# Patient Record
Sex: FEMALE | Race: BLACK OR AFRICAN AMERICAN | NOT HISPANIC OR LATINO | Employment: OTHER | URBAN - METROPOLITAN AREA
[De-identification: names, ages, dates, MRNs, and addresses within clinical notes are randomized per-mention and may not be internally consistent; named-entity substitution may affect disease eponyms.]

---

## 2017-06-01 RX ORDER — ATORVASTATIN CALCIUM 80 MG/1
TABLET, FILM COATED ORAL
Qty: 30 TABLET | Refills: 0 | Status: SHIPPED | OUTPATIENT
Start: 2017-06-01 | End: 2018-03-12 | Stop reason: SDUPTHER

## 2017-06-01 RX ORDER — CLOPIDOGREL BISULFATE 75 MG/1
TABLET ORAL
Qty: 30 TABLET | Refills: 0 | Status: SHIPPED | OUTPATIENT
Start: 2017-06-01 | End: 2018-03-12 | Stop reason: SDUPTHER

## 2017-06-06 ENCOUNTER — TELEPHONE (OUTPATIENT)
Dept: INTERNAL MEDICINE | Facility: CLINIC | Age: 82
End: 2017-06-06

## 2017-06-06 RX ORDER — LISINOPRIL 40 MG/1
TABLET ORAL
Qty: 30 TABLET | Refills: 0 | Status: SHIPPED | OUTPATIENT
Start: 2017-06-06 | End: 2018-03-12 | Stop reason: SDUPTHER

## 2017-12-29 RX ORDER — LISINOPRIL 40 MG/1
TABLET ORAL
Qty: 30 TABLET | Refills: 0 | OUTPATIENT
Start: 2017-12-29

## 2017-12-29 RX ORDER — HYDROCHLOROTHIAZIDE 25 MG/1
TABLET ORAL
Qty: 30 TABLET | Refills: 0 | OUTPATIENT
Start: 2017-12-29

## 2017-12-29 RX ORDER — ATORVASTATIN CALCIUM 80 MG/1
TABLET, FILM COATED ORAL
Qty: 30 TABLET | Refills: 0 | OUTPATIENT
Start: 2017-12-29

## 2017-12-29 RX ORDER — CLOPIDOGREL BISULFATE 75 MG/1
TABLET ORAL
Qty: 30 TABLET | Refills: 0 | OUTPATIENT
Start: 2017-12-29

## 2018-03-05 ENCOUNTER — TELEPHONE (OUTPATIENT)
Dept: INTERNAL MEDICINE | Facility: CLINIC | Age: 83
End: 2018-03-05

## 2018-03-05 NOTE — TELEPHONE ENCOUNTER
----- Message from Ofelia Garcia sent at 3/5/2018 12:24 PM CST -----  Contact: Hayden  - granddaughter at [phone]  Pt need refills on all of her medications. Pt is out of all medications.      [facility] [address] [place] [address]   [phone] (Phone)  [phone] (Fax)

## 2018-03-12 ENCOUNTER — LAB VISIT (OUTPATIENT)
Dept: LAB | Facility: HOSPITAL | Age: 83
End: 2018-03-12
Payer: MEDICARE

## 2018-03-12 ENCOUNTER — IMMUNIZATION (OUTPATIENT)
Dept: INTERNAL MEDICINE | Facility: CLINIC | Age: 83
End: 2018-03-12
Payer: MEDICARE

## 2018-03-12 ENCOUNTER — OFFICE VISIT (OUTPATIENT)
Dept: INTERNAL MEDICINE | Facility: CLINIC | Age: 83
End: 2018-03-12
Payer: MEDICARE

## 2018-03-12 VITALS
OXYGEN SATURATION: 98 % | BODY MASS INDEX: 31.68 KG/M2 | SYSTOLIC BLOOD PRESSURE: 190 MMHG | WEIGHT: 172.19 LBS | HEART RATE: 96 BPM | DIASTOLIC BLOOD PRESSURE: 80 MMHG | HEIGHT: 62 IN

## 2018-03-12 DIAGNOSIS — I25.10 CORONARY ARTERY DISEASE, ANGINA PRESENCE UNSPECIFIED, UNSPECIFIED VESSEL OR LESION TYPE, UNSPECIFIED WHETHER NATIVE OR TRANSPLANTED HEART: Primary | ICD-10-CM

## 2018-03-12 DIAGNOSIS — Z91.148 NON COMPLIANCE W MEDICATION REGIMEN: ICD-10-CM

## 2018-03-12 DIAGNOSIS — I77.9 CAROTID ARTERY DISEASE, UNSPECIFIED LATERALITY: ICD-10-CM

## 2018-03-12 DIAGNOSIS — Z87.891 HISTORY OF TOBACCO USE: ICD-10-CM

## 2018-03-12 DIAGNOSIS — I50.9 CONGESTIVE HEART FAILURE, UNSPECIFIED CONGESTIVE HEART FAILURE CHRONICITY, UNSPECIFIED CONGESTIVE HEART FAILURE TYPE: ICD-10-CM

## 2018-03-12 DIAGNOSIS — I25.10 CORONARY ARTERY DISEASE, ANGINA PRESENCE UNSPECIFIED, UNSPECIFIED VESSEL OR LESION TYPE, UNSPECIFIED WHETHER NATIVE OR TRANSPLANTED HEART: ICD-10-CM

## 2018-03-12 DIAGNOSIS — Z23 NEED FOR TETANUS BOOSTER: ICD-10-CM

## 2018-03-12 DIAGNOSIS — Z78.0 ASYMPTOMATIC MENOPAUSAL STATE: ICD-10-CM

## 2018-03-12 DIAGNOSIS — I10 ESSENTIAL HYPERTENSION: ICD-10-CM

## 2018-03-12 DIAGNOSIS — Z23 NEED FOR 23-POLYVALENT PNEUMOCOCCAL POLYSACCHARIDE VACCINE: ICD-10-CM

## 2018-03-12 DIAGNOSIS — Z23 NEED FOR PROPHYLACTIC VACCINATION AND INOCULATION AGAINST INFLUENZA: ICD-10-CM

## 2018-03-12 LAB
ALBUMIN SERPL BCP-MCNC: 4 G/DL
ALP SERPL-CCNC: 133 U/L
ALT SERPL W/O P-5'-P-CCNC: 16 U/L
ANION GAP SERPL CALC-SCNC: 11 MMOL/L
AST SERPL-CCNC: 25 U/L
BASOPHILS # BLD AUTO: 0.01 K/UL
BASOPHILS NFR BLD: 0.2 %
BILIRUB SERPL-MCNC: 0.7 MG/DL
BUN SERPL-MCNC: 21 MG/DL
CALCIUM SERPL-MCNC: 10.9 MG/DL
CHLORIDE SERPL-SCNC: 104 MMOL/L
CHOLEST SERPL-MCNC: 150 MG/DL
CHOLEST/HDLC SERPL: 2.2 {RATIO}
CO2 SERPL-SCNC: 25 MMOL/L
CREAT SERPL-MCNC: 0.9 MG/DL
DIFFERENTIAL METHOD: NORMAL
EOSINOPHIL # BLD AUTO: 0.1 K/UL
EOSINOPHIL NFR BLD: 0.9 %
ERYTHROCYTE [DISTWIDTH] IN BLOOD BY AUTOMATED COUNT: 13.9 %
EST. GFR  (AFRICAN AMERICAN): >60 ML/MIN/1.73 M^2
EST. GFR  (NON AFRICAN AMERICAN): >60 ML/MIN/1.73 M^2
GLUCOSE SERPL-MCNC: 113 MG/DL
HCT VFR BLD AUTO: 38.4 %
HDLC SERPL-MCNC: 68 MG/DL
HDLC SERPL: 45.3 %
HGB BLD-MCNC: 12.7 G/DL
LDLC SERPL CALC-MCNC: 70.8 MG/DL
LYMPHOCYTES # BLD AUTO: 1.6 K/UL
LYMPHOCYTES NFR BLD: 28 %
MCH RBC QN AUTO: 27.9 PG
MCHC RBC AUTO-ENTMCNC: 33.1 G/DL
MCV RBC AUTO: 84 FL
MONOCYTES # BLD AUTO: 0.5 K/UL
MONOCYTES NFR BLD: 8.5 %
NEUTROPHILS # BLD AUTO: 3.5 K/UL
NEUTROPHILS NFR BLD: 62.2 %
NONHDLC SERPL-MCNC: 82 MG/DL
PLATELET # BLD AUTO: 238 K/UL
PMV BLD AUTO: 10.9 FL
POTASSIUM SERPL-SCNC: 4 MMOL/L
PROT SERPL-MCNC: 8.3 G/DL
RBC # BLD AUTO: 4.56 M/UL
SODIUM SERPL-SCNC: 140 MMOL/L
TRIGL SERPL-MCNC: 56 MG/DL
TSH SERPL DL<=0.005 MIU/L-ACNC: 0.76 UIU/ML
WBC # BLD AUTO: 5.68 K/UL

## 2018-03-12 PROCEDURE — 36415 COLL VENOUS BLD VENIPUNCTURE: CPT

## 2018-03-12 PROCEDURE — 99999 PR PBB SHADOW E&M-EST. PATIENT-LVL IV: CPT | Mod: PBBFAC,,, | Performed by: NURSE PRACTITIONER

## 2018-03-12 PROCEDURE — 90662 IIV NO PRSV INCREASED AG IM: CPT | Mod: PBBFAC

## 2018-03-12 PROCEDURE — 84443 ASSAY THYROID STIM HORMONE: CPT

## 2018-03-12 PROCEDURE — 4040F PNEUMOC VAC/ADMIN/RCVD: CPT | Mod: ,,, | Performed by: NURSE PRACTITIONER

## 2018-03-12 PROCEDURE — 4037F INFLUENZA IMM ORDER/ADMIN: CPT | Mod: ,,, | Performed by: NURSE PRACTITIONER

## 2018-03-12 PROCEDURE — 99214 OFFICE O/P EST MOD 30 MIN: CPT | Mod: PBBFAC,25 | Performed by: NURSE PRACTITIONER

## 2018-03-12 PROCEDURE — 80061 LIPID PANEL: CPT

## 2018-03-12 PROCEDURE — 85025 COMPLETE CBC W/AUTO DIFF WBC: CPT

## 2018-03-12 PROCEDURE — G0009 ADMIN PNEUMOCOCCAL VACCINE: HCPCS | Mod: PBBFAC

## 2018-03-12 PROCEDURE — 80053 COMPREHEN METABOLIC PANEL: CPT

## 2018-03-12 PROCEDURE — 99214 OFFICE O/P EST MOD 30 MIN: CPT | Mod: S$PBB,,, | Performed by: NURSE PRACTITIONER

## 2018-03-12 RX ORDER — CLOPIDOGREL BISULFATE 75 MG/1
TABLET ORAL
Qty: 30 TABLET | Refills: 6 | Status: SHIPPED | OUTPATIENT
Start: 2018-03-12 | End: 2019-04-01 | Stop reason: SDUPTHER

## 2018-03-12 RX ORDER — NITROGLYCERIN 0.4 MG/1
0.4 TABLET SUBLINGUAL EVERY 5 MIN PRN
Qty: 60 TABLET | Refills: 11 | Status: SHIPPED | OUTPATIENT
Start: 2018-03-12

## 2018-03-12 RX ORDER — ATORVASTATIN CALCIUM 80 MG/1
TABLET, FILM COATED ORAL
Qty: 30 TABLET | Refills: 6 | Status: SHIPPED | OUTPATIENT
Start: 2018-03-12 | End: 2019-04-01 | Stop reason: SDUPTHER

## 2018-03-12 RX ORDER — AMLODIPINE BESYLATE 10 MG/1
10 TABLET ORAL DAILY
Qty: 30 TABLET | Refills: 6 | Status: SHIPPED | OUTPATIENT
Start: 2018-03-12 | End: 2019-04-01 | Stop reason: SDUPTHER

## 2018-03-12 RX ORDER — HYDROCHLOROTHIAZIDE 25 MG/1
25 TABLET ORAL DAILY
Qty: 30 TABLET | Refills: 6 | Status: SHIPPED | OUTPATIENT
Start: 2018-03-12 | End: 2019-04-01 | Stop reason: SDUPTHER

## 2018-03-12 RX ORDER — LISINOPRIL 40 MG/1
TABLET ORAL
Qty: 30 TABLET | Refills: 6 | Status: SHIPPED | OUTPATIENT
Start: 2018-03-12 | End: 2019-04-01 | Stop reason: SDUPTHER

## 2018-03-12 NOTE — PROGRESS NOTES
"Subjective:       Patient ID: Ciara Lozano is a 82 y.o. female.    Chief Complaint: Foot Swelling and Medication Refill    Pt here for med refills.  Pt admits to be out of her meds for at least 2 months.  States that she has transportation issues.  Denies headache, SOB, chest pain.        Past Medical History:   Diagnosis Date    CAD (coronary artery disease) 8/12/2014    Carotid artery disease     Congestive heart failure, unspecified     Encounter for blood transfusion     Hemangioma 4/2/2013    Hyperlipidemia     Hypertension     Peripheral vascular disease     Syncope and collapse      Past Surgical History:   Procedure Laterality Date    CARDIAC CATHETERIZATION      cataracts      CORONARY ANGIOPLASTY       Social History     Social History Narrative    No narrative on file     Family History   Problem Relation Age of Onset    Hypertension Mother     Hypertension Father     Anuerysm Sister     No Known Problems Maternal Grandmother     No Known Problems Maternal Grandfather     No Known Problems Paternal Grandmother     No Known Problems Paternal Grandfather     No Known Problems Brother     No Known Problems Sister     No Known Problems Maternal Aunt     No Known Problems Maternal Uncle     No Known Problems Paternal Aunt     No Known Problems Paternal Uncle     Anemia Neg Hx     Arrhythmia Neg Hx     Asthma Neg Hx     Clotting disorder Neg Hx     Fainting Neg Hx     Heart attack Neg Hx     Heart disease Neg Hx     Heart failure Neg Hx     Hyperlipidemia Neg Hx      Vitals:    03/12/18 0915   BP: (!) 190/80   Pulse: 96   SpO2: 98%   Weight: 78.1 kg (172 lb 2.9 oz)   Height: 5' 2" (1.575 m)   PainSc: 0-No pain     Outpatient Encounter Prescriptions as of 3/12/2018   Medication Sig Dispense Refill    amLODIPine (NORVASC) 10 MG tablet Take 1 tablet (10 mg total) by mouth once daily. 30 tablet 6    aspirin (ECOTRIN) 81 MG EC tablet Take 81 mg by mouth once daily.      " "atorvastatin (LIPITOR) 80 MG tablet TAKE 1 TABLET(80 MG) BY MOUTH EVERY DAY 30 tablet 6    clopidogrel (PLAVIX) 75 mg tablet TAKE 1 TABLET(75 MG) BY MOUTH EVERY DAY 30 tablet 6    hydroCHLOROthiazide (HYDRODIURIL) 25 MG tablet Take 1 tablet (25 mg total) by mouth once daily. 30 tablet 6    lisinopril (PRINIVIL,ZESTRIL) 40 MG tablet TAKE 1 TABLET(40 MG) BY MOUTH EVERY DAY 30 tablet 6    nitroGLYCERIN (NITROSTAT) 0.4 MG SL tablet Place 1 tablet (0.4 mg total) under the tongue every 5 (five) minutes as needed for Chest pain (no more tiana 3 doses). 60 tablet 11    [DISCONTINUED] amlodipine (NORVASC) 10 MG tablet Take 1 tablet (10 mg total) by mouth once daily. 30 tablet 3    [DISCONTINUED] amlodipine (NORVASC) 10 MG tablet Take 1 tablet (10 mg total) by mouth once daily. 30 tablet 2    [DISCONTINUED] atorvastatin (LIPITOR) 80 MG tablet TAKE 1 TABLET(80 MG) BY MOUTH EVERY DAY 30 tablet 0    [DISCONTINUED] clopidogrel (PLAVIX) 75 mg tablet TAKE 1 TABLET(75 MG) BY MOUTH EVERY DAY 30 tablet 0    [DISCONTINUED] hydrochlorothiazide (HYDRODIURIL) 25 MG tablet Take 1 tablet (25 mg total) by mouth once daily. 30 tablet 2    [DISCONTINUED] lisinopril (PRINIVIL,ZESTRIL) 40 MG tablet TAKE 1 TABLET(40 MG) BY MOUTH EVERY DAY 30 tablet 0    [DISCONTINUED] NITROSTAT 0.4 mg SL tablet   11     No facility-administered encounter medications on file as of 3/12/2018.      Wt Readings from Last 3 Encounters:   03/12/18 78.1 kg (172 lb 2.9 oz)   07/11/16 75.3 kg (166 lb 0.1 oz)   03/09/16 76.6 kg (168 lb 14 oz)     Last 3 sets of Vitals    Vitals - 1 value per visit 3/9/2016 7/11/2016 3/12/2018   SYSTOLIC 100 138 190   DIASTOLIC 62 70 80   PULSE 64 64 96   TEMPERATURE - - -   RESPIRATIONS - - -   SPO2 - 99 98   Weight (lb) 168.87 166.01 172.18   Weight (kg) 76.6 75.3 78.1   HEIGHT 5' 2" 5' 2" 5' 2"   BODY MASS INDEX 30.88 30.36 31.49   VISIT REPORT - - -   Pain Score  0 0 0     No results found for: CBC  Review of Systems "   Constitutional: Negative for activity change, appetite change and fatigue.   Eyes: Negative for photophobia.   Cardiovascular: Negative for chest pain and palpitations.   Gastrointestinal: Negative for abdominal pain, constipation, diarrhea, nausea and vomiting.   Musculoskeletal: Negative for arthralgias and myalgias.   Skin: Negative for rash.   Neurological: Negative for dizziness, facial asymmetry and headaches.   Hematological: Negative for adenopathy.   Psychiatric/Behavioral: Negative for sleep disturbance.       Objective:      Physical Exam   Constitutional: She is oriented to person, place, and time. She appears well-developed and well-nourished. No distress.   HENT:   Head: Normocephalic and atraumatic.   Right Ear: External ear normal.   Left Ear: External ear normal.   Nose: Nose normal.   Mouth/Throat: Oropharynx is clear and moist and mucous membranes are normal. Abnormal dentition. No oropharyngeal exudate. No tonsillar exudate.   Missing upper teeth  Gumline recession noted to lower teeth     Eyes: Conjunctivae are normal. Pupils are equal, round, and reactive to light. Right eye exhibits no discharge. Left eye exhibits no discharge.   Neck: Normal range of motion. Neck supple.   Cardiovascular: Normal rate and regular rhythm.    Pulmonary/Chest: Effort normal and breath sounds normal. No stridor. No respiratory distress. She has no wheezes.   Abdominal: Soft.   Lymphadenopathy:     She has no cervical adenopathy.   Neurological: She is alert and oriented to person, place, and time.   Skin: Skin is warm and dry. Capillary refill takes less than 2 seconds. No rash noted. She is not diaphoretic. No erythema. No pallor.   Psychiatric: She has a normal mood and affect. Her behavior is normal. Judgment and thought content normal.   Nursing note and vitals reviewed.      Assessment:       1. Coronary artery disease, angina presence unspecified, unspecified vessel or lesion type, unspecified whether  native or transplanted heart    2. Congestive heart failure, unspecified congestive heart failure chronicity, unspecified congestive heart failure type    3. Carotid artery disease, unspecified laterality    4. Non compliance w medication regimen    5. Essential hypertension    6. Asymptomatic menopausal state    7. Need for prophylactic vaccination and inoculation against influenza    8. Need for 23-polyvalent pneumococcal polysaccharide vaccine    9. Need for tetanus booster    10. History of tobacco use        Plan:       Social work consult placed to assist pt with transportation issues  D/W pt that she needs to be seen every 4-6 months to watch her BP or sooner if she is not at goal  Pt verb understandingYves Urbina was seen today for foot swelling and medication refill.    Diagnoses and all orders for this visit:    Coronary artery disease, angina presence unspecified, unspecified vessel or lesion type, unspecified whether native or transplanted heart  -     CBC auto differential; Future  -     Comprehensive metabolic panel; Future  -     Lipid panel; Future  -     nitroGLYCERIN (NITROSTAT) 0.4 MG SL tablet; Place 1 tablet (0.4 mg total) under the tongue every 5 (five) minutes as needed for Chest pain (no more tiana 3 doses).  -     atorvastatin (LIPITOR) 80 MG tablet; TAKE 1 TABLET(80 MG) BY MOUTH EVERY DAY  -     clopidogrel (PLAVIX) 75 mg tablet; TAKE 1 TABLET(75 MG) BY MOUTH EVERY DAY  -     Ambulatory consult to Social Work    Congestive heart failure, unspecified congestive heart failure chronicity, unspecified congestive heart failure type  -     Lipid panel; Future  -     hydroCHLOROthiazide (HYDRODIURIL) 25 MG tablet; Take 1 tablet (25 mg total) by mouth once daily.  -     Ambulatory consult to Social Work    Carotid artery disease, unspecified laterality  -     CBC auto differential; Future  -     Lipid panel; Future  -     TSH; Future  -     nitroGLYCERIN (NITROSTAT) 0.4 MG SL tablet; Place 1 tablet  (0.4 mg total) under the tongue every 5 (five) minutes as needed for Chest pain (no more tiana 3 doses).  -     atorvastatin (LIPITOR) 80 MG tablet; TAKE 1 TABLET(80 MG) BY MOUTH EVERY DAY  -     clopidogrel (PLAVIX) 75 mg tablet; TAKE 1 TABLET(75 MG) BY MOUTH EVERY DAY  -     Ambulatory consult to Social Work    Non compliance w medication regimen  Comments:  pt has cancelled or no showed multiple appts not seen since 7/2016, states that she has transportation issues      Orders:  -     Ambulatory consult to Social Work    Essential hypertension  -     CBC auto differential; Future  -     Comprehensive metabolic panel; Future  -     TSH; Future  -     MICROALBUMIN / CREATININE RATIO URINE; Future  -     lisinopril (PRINIVIL,ZESTRIL) 40 MG tablet; TAKE 1 TABLET(40 MG) BY MOUTH EVERY DAY  -     atorvastatin (LIPITOR) 80 MG tablet; TAKE 1 TABLET(80 MG) BY MOUTH EVERY DAY  -     amLODIPine (NORVASC) 10 MG tablet; Take 1 tablet (10 mg total) by mouth once daily.  -     hydroCHLOROthiazide (HYDRODIURIL) 25 MG tablet; Take 1 tablet (25 mg total) by mouth once daily.  -     Ambulatory consult to Social Work    Asymptomatic menopausal state  -     DXA Bone Density Spine And Hip; Future    Need for prophylactic vaccination and inoculation against influenza  -     Influenza - High Dose (65+) (PF) (IM)    Need for 23-polyvalent pneumococcal polysaccharide vaccine  -     (In Office Administered) Pneumococcal Polysaccharide Vaccine (23 Valent) (SQ/IM)    Need for tetanus booster  -     (In Office Administered) Td Vaccine - Preservative Free    History of tobacco use  -     (In Office Administered) Pneumococcal Polysaccharide Vaccine (23 Valent) (SQ/IM)      Patient Instructions   Follow up in 2-4 weeks to check blood pressure    Blood work and urine test today    You need to be seen every 4-6 months to keep an eye on your blood pressure.        Low-Salt Diet  This diet removes foods that are high in salt. It also limits the amount  of salt you use when cooking. It is most often used for people with high blood pressure, edema (fluid retention), and kidney, liver, or heart disease.  Table salt contains the mineral sodium. Your body needs sodium to work normally. But too much sodium can make your health problems worse. Your healthcare provider is recommending a low-salt (also called low-sodium) diet for you. Your total daily allowance of salt is 1,500 to 2,300 milligrams (mg). It is less than 1 teaspoon of table salt. This means you can have only about 500 to 700 mg of sodium at each meal. People with certain health problems should limit salt intake to the lower end of the recommended range.    When you cook, dont add much salt. If you can cook without using salt, even better. Dont add salt to your food at the table.  When shopping, read food labels. Salt is often called sodium on the label. Choose foods that are salt-free, low salt, or very low salt. Note that foods with reduced salt may not lower your salt intake enough.    Beans, potatoes, and pasta  Ok: Dry beans, split peas, lentils, potatoes, rice, macaroni, pasta, spaghetti without added salt  Avoid: Potato chips, tortilla chips, and similar products  Breads and cereals  Ok: Low-sodium breads, rolls, cereals, and cakes; low-salt crackers, matzo crackers  Avoid: Salted crackers, pretzels, popcorn, Kinyarwanda toast, pancakes, muffins  Dairy  Ok: Milk, chocolate milk, hot chocolate mix, low-salt cheeses, and yogurt  Avoid: Processed cheese and cheese spreads; Roquefort, Camembert, and cottage cheese; buttermilk, instant breakfast drink  Desserts  Ok: Ice cream, frozen yogurt, juice bars, gelatin, cookies and pies, sugar, honey, jelly, hard candy  Avoid: Most pies, cakes and cookies prepared or processed with salt; instant pudding  Drinks  Ok: Tea, coffee, fizzy (carbonated) drinks, juices  Avoid: Flavored coffees, electrolyte replacement drinks, sports drinks  Meats  Ok: All fresh meat, fish,  poultry, low-salt tuna, eggs, egg substitute  Avoid: Smoked, pickled, brine-cured, or salted meats and fish. This includes johnson, chipped beef, corned beef, hot dogs, deli meats, ham, kosher meats, salt pork, sausage, canned tuna, salted codfish, smoked salmon, herring, sardines, or anchovies.  Seasonings and spices  Ok: Most seasonings are okay. Good substitutes for salt include: fresh herb blends, hot sauce, lemon, garlic, lawrence, vinegar, dry mustard, parsley, cilantro, horseradish, tomato paste, regular margarine, mayonnaise, unsalted butter, cream cheese, vegetable oil, cream, low-salt salad dressing and gravy.  Avoid: Regular ketchup, relishes, pickles, soy sauce, teriyaki sauce, Worcestershire sauce, BBQ sauce, tartar sauce, meat tenderizer, chili sauce, regular gravy, regular salad dressing, salted butter  Soups  Ok: Low-salt soups and broths made with allowed foods  Avoid: Bouillon cubes, soups with smoked or salted meats, regular soup and broth  Vegetables  Ok: Most vegetables are okay; also low-salt tomato and vegetable juices  Avoid: Sauerkraut and other brine-soaked vegetables; pickles and other pickled vegetables; tomato juice, olives  Date Last Reviewed: 8/1/2016  © 4086-2580 [employer]. [address] [place] [address]. All rights reserved. This information is not intended as a substitute for professional medical care. Always follow your healthcare professional's instructions.

## 2018-03-12 NOTE — PATIENT INSTRUCTIONS
Follow up in 2-4 weeks to check blood pressure    Blood work and urine test today    You need to be seen every 4-6 months to keep an eye on your blood pressure.        Low-Salt Diet  This diet removes foods that are high in salt. It also limits the amount of salt you use when cooking. It is most often used for people with high blood pressure, edema (fluid retention), and kidney, liver, or heart disease.  Table salt contains the mineral sodium. Your body needs sodium to work normally. But too much sodium can make your health problems worse. Your healthcare provider is recommending a low-salt (also called low-sodium) diet for you. Your total daily allowance of salt is 1,500 to 2,300 milligrams (mg). It is less than 1 teaspoon of table salt. This means you can have only about 500 to 700 mg of sodium at each meal. People with certain health problems should limit salt intake to the lower end of the recommended range.    When you cook, dont add much salt. If you can cook without using salt, even better. Dont add salt to your food at the table.  When shopping, read food labels. Salt is often called sodium on the label. Choose foods that are salt-free, low salt, or very low salt. Note that foods with reduced salt may not lower your salt intake enough.    Beans, potatoes, and pasta  Ok: Dry beans, split peas, lentils, potatoes, rice, macaroni, pasta, spaghetti without added salt  Avoid: Potato chips, tortilla chips, and similar products  Breads and cereals  Ok: Low-sodium breads, rolls, cereals, and cakes; low-salt crackers, matzo crackers  Avoid: Salted crackers, pretzels, popcorn, [place] toast, pancakes, muffins  Dairy  Ok: Milk, chocolate milk, hot chocolate mix, low-salt cheeses, and yogurt  Avoid: Processed cheese and cheese spreads; Roquefort, Camembert, and cottage cheese; buttermilk, instant breakfast drink  Desserts  Ok: Ice cream, frozen yogurt, juice bars, gelatin, cookies and pies, sugar, honey, jelly, hard  candy  Avoid: Most pies, cakes and cookies prepared or processed with salt; instant pudding  Drinks  Ok: Tea, coffee, fizzy (carbonated) drinks, juices  Avoid: Flavored coffees, electrolyte replacement drinks, sports drinks  Meats  Ok: All fresh meat, fish, poultry, low-salt tuna, eggs, egg substitute  Avoid: Smoked, pickled, brine-cured, or salted meats and fish. This includes johnson, chipped beef, corned beef, hot dogs, deli meats, ham, kosher meats, salt pork, sausage, canned tuna, salted codfish, smoked salmon, herring, sardines, or anchovies.  Seasonings and spices  Ok: Most seasonings are okay. Good substitutes for salt include: fresh herb blends, hot sauce, lemon, garlic, lawrence, vinegar, dry mustard, parsley, cilantro, horseradish, tomato paste, regular margarine, mayonnaise, unsalted butter, cream cheese, vegetable oil, cream, low-salt salad dressing and gravy.  Avoid: Regular ketchup, relishes, pickles, soy sauce, teriyaki sauce, Worcestershire sauce, BBQ sauce, tartar sauce, meat tenderizer, chili sauce, regular gravy, regular salad dressing, salted butter  Soups  Ok: Low-salt soups and broths made with allowed foods  Avoid: Bouillon cubes, soups with smoked or salted meats, regular soup and broth  Vegetables  Ok: Most vegetables are okay; also low-salt tomato and vegetable juices  Avoid: Sauerkraut and other brine-soaked vegetables; pickles and other pickled vegetables; tomato juice, olives  Date Last Reviewed: 8/1/2016  © 8597-2833 [employer]. [address] [place] [address], [place] [address]. All rights reserved. This information is not intended as a substitute for professional medical care. Always follow your healthcare professional's instructions.

## 2018-03-13 DIAGNOSIS — R73.01 ELEVATED FASTING GLUCOSE: Primary | ICD-10-CM

## 2019-04-01 ENCOUNTER — TELEPHONE (OUTPATIENT)
Dept: INTERNAL MEDICINE | Facility: CLINIC | Age: 84
End: 2019-04-01

## 2019-04-01 DIAGNOSIS — I50.9 CONGESTIVE HEART FAILURE: ICD-10-CM

## 2019-04-01 DIAGNOSIS — I77.9 CAROTID ARTERY DISEASE, UNSPECIFIED LATERALITY: ICD-10-CM

## 2019-04-01 DIAGNOSIS — I25.10 CORONARY ARTERY DISEASE, ANGINA PRESENCE UNSPECIFIED, UNSPECIFIED VESSEL OR LESION TYPE, UNSPECIFIED WHETHER NATIVE OR TRANSPLANTED HEART: ICD-10-CM

## 2019-04-01 DIAGNOSIS — I10 ESSENTIAL HYPERTENSION: ICD-10-CM

## 2019-04-02 RX ORDER — ATORVASTATIN CALCIUM 80 MG/1
TABLET, FILM COATED ORAL
Qty: 30 TABLET | Refills: 0 | Status: SHIPPED | OUTPATIENT
Start: 2019-04-02 | End: 2019-05-09 | Stop reason: SDUPTHER

## 2019-04-02 RX ORDER — HYDROCHLOROTHIAZIDE 25 MG/1
TABLET ORAL
Qty: 30 TABLET | Refills: 0 | Status: SHIPPED | OUTPATIENT
Start: 2019-04-02 | End: 2019-05-09 | Stop reason: SDUPTHER

## 2019-04-02 RX ORDER — CLOPIDOGREL BISULFATE 75 MG/1
TABLET ORAL
Qty: 30 TABLET | Refills: 0 | Status: SHIPPED | OUTPATIENT
Start: 2019-04-02 | End: 2019-05-09 | Stop reason: SDUPTHER

## 2019-04-02 RX ORDER — AMLODIPINE BESYLATE 10 MG/1
TABLET ORAL
Qty: 30 TABLET | Refills: 0 | Status: SHIPPED | OUTPATIENT
Start: 2019-04-02 | End: 2019-05-09 | Stop reason: SDUPTHER

## 2019-04-02 RX ORDER — LISINOPRIL 40 MG/1
TABLET ORAL
Qty: 30 TABLET | Refills: 0 | Status: SHIPPED | OUTPATIENT
Start: 2019-04-02 | End: 2019-05-09 | Stop reason: SDUPTHER

## 2019-04-02 NOTE — TELEPHONE ENCOUNTER
Spoke with pt and scheduled jamia per FNP Adalberto for Dr. Gonzales for pt's annual. Letter mailed out. Pt's granddaughter agreed verbally.

## 2019-04-02 NOTE — TELEPHONE ENCOUNTER
Pita,  Please set pt up with REECE Gonzales for her annual exam.  She was due in March, I saw her last year.  She  needs to see Dr Gonzales this time    Thanks  Aisha

## 2019-05-09 ENCOUNTER — OFFICE VISIT (OUTPATIENT)
Dept: INTERNAL MEDICINE | Facility: CLINIC | Age: 84
End: 2019-05-09
Payer: MEDICARE

## 2019-05-09 ENCOUNTER — HOSPITAL ENCOUNTER (OUTPATIENT)
Dept: RADIOLOGY | Facility: HOSPITAL | Age: 84
Discharge: HOME | End: 2019-05-09
Attending: INTERNAL MEDICINE
Payer: MEDICARE

## 2019-05-09 DIAGNOSIS — I50.9 CONGESTIVE HEART FAILURE: ICD-10-CM

## 2019-05-09 DIAGNOSIS — I10 ESSENTIAL HYPERTENSION: ICD-10-CM

## 2019-05-09 DIAGNOSIS — I25.10 CORONARY ARTERY DISEASE, ANGINA PRESENCE UNSPECIFIED, UNSPECIFIED VESSEL OR LESION TYPE, UNSPECIFIED WHETHER NATIVE OR TRANSPLANTED HEART: ICD-10-CM

## 2019-05-09 DIAGNOSIS — Z12.31 SCREENING MAMMOGRAM, ENCOUNTER FOR: ICD-10-CM

## 2019-05-09 DIAGNOSIS — I77.9 CAROTID ARTERY DISEASE, UNSPECIFIED LATERALITY: ICD-10-CM

## 2019-05-09 DIAGNOSIS — Z12.31 SCREENING MAMMOGRAM, ENCOUNTER FOR: Primary | ICD-10-CM

## 2019-05-09 PROCEDURE — 99214 PR OFFICE/OUTPT VISIT, EST, LEVL IV, 30-39 MIN: ICD-10-PCS | Mod: S$PBB,,, | Performed by: INTERNAL MEDICINE

## 2019-05-09 PROCEDURE — 77067 SCR MAMMO BI INCL CAD: CPT | Mod: TC

## 2019-05-09 PROCEDURE — 99999 PR PBB SHADOW E&M-EST. PATIENT-LVL III: CPT | Mod: PBBFAC,,, | Performed by: INTERNAL MEDICINE

## 2019-05-09 PROCEDURE — 77067 SCR MAMMO BI INCL CAD: CPT | Mod: 26,,, | Performed by: RADIOLOGY

## 2019-05-09 PROCEDURE — 99999 PR PBB SHADOW E&M-EST. PATIENT-LVL III: ICD-10-PCS | Mod: PBBFAC,,, | Performed by: INTERNAL MEDICINE

## 2019-05-09 PROCEDURE — 99214 OFFICE O/P EST MOD 30 MIN: CPT | Mod: S$PBB,,, | Performed by: INTERNAL MEDICINE

## 2019-05-09 PROCEDURE — 99213 OFFICE O/P EST LOW 20 MIN: CPT | Mod: PBBFAC | Performed by: INTERNAL MEDICINE

## 2019-05-09 PROCEDURE — 77067 MAMMO DIGITAL SCREENING BILAT WITH CAD: ICD-10-PCS | Mod: 26,,, | Performed by: RADIOLOGY

## 2019-05-09 RX ORDER — HYDROCHLOROTHIAZIDE 25 MG/1
TABLET ORAL
Qty: 30 TABLET | Refills: 5 | Status: ON HOLD | OUTPATIENT
Start: 2019-05-09 | End: 2020-03-10 | Stop reason: HOSPADM

## 2019-05-09 RX ORDER — CLOPIDOGREL BISULFATE 75 MG/1
TABLET ORAL
Qty: 30 TABLET | Refills: 5 | Status: ON HOLD | OUTPATIENT
Start: 2019-05-09 | End: 2020-03-10 | Stop reason: HOSPADM

## 2019-05-09 RX ORDER — AMLODIPINE BESYLATE 10 MG/1
TABLET ORAL
Qty: 30 TABLET | Refills: 5 | Status: ON HOLD | OUTPATIENT
Start: 2019-05-09 | End: 2020-03-10 | Stop reason: HOSPADM

## 2019-05-09 RX ORDER — ATORVASTATIN CALCIUM 80 MG/1
TABLET, FILM COATED ORAL
Qty: 30 TABLET | Refills: 5 | Status: ON HOLD | OUTPATIENT
Start: 2019-05-09 | End: 2020-03-10 | Stop reason: SDUPTHER

## 2019-05-09 RX ORDER — LISINOPRIL 40 MG/1
TABLET ORAL
Qty: 30 TABLET | Refills: 5 | Status: ON HOLD | OUTPATIENT
Start: 2019-05-09 | End: 2020-03-10 | Stop reason: SDUPTHER

## 2019-05-14 VITALS
HEART RATE: 95 BPM | BODY MASS INDEX: 30.22 KG/M2 | HEIGHT: 62 IN | WEIGHT: 164.25 LBS | OXYGEN SATURATION: 99 % | SYSTOLIC BLOOD PRESSURE: 138 MMHG | DIASTOLIC BLOOD PRESSURE: 82 MMHG

## 2019-05-14 NOTE — PROGRESS NOTES
Subjective:       Patient ID: Ciara Lozano is a 83 y.o. female.    Chief Complaint: Hypertension    HPI  She returns for management of hypertension.  She has had hypertension for over a year.  Current treatment has included medications outlined in medication list.  She denies chest pain or shortness of breath.  No palpitations.  Denies left arm or neck pain.    Medications:  See med list    Social history:  Does not smoke, does not drink alcohol      Review of Systems   Constitutional: Negative for chills, fatigue, fever and unexpected weight change.   Respiratory: Negative for chest tightness and shortness of breath.    Cardiovascular: Negative for chest pain and palpitations.   Gastrointestinal: Negative for abdominal pain and blood in stool.   Neurological: Negative for dizziness, syncope, numbness and headaches.       Objective:      Physical Exam   HENT:   Right Ear: External ear normal.   Left Ear: External ear normal.   Nose: Nose normal.   Mouth/Throat: Oropharynx is clear and moist.   Eyes: Pupils are equal, round, and reactive to light.   Neck: Normal range of motion.   Cardiovascular: Normal rate and regular rhythm.   No murmur heard.  Pulmonary/Chest: Breath sounds normal.   Abdominal: She exhibits no distension. There is no hepatosplenomegaly. There is no tenderness.   Lymphadenopathy:     She has no cervical adenopathy.     She has no axillary adenopathy.   Neurological: She has normal strength and normal reflexes. No cranial nerve deficit or sensory deficit.     breast exam:  No masses palpated, no nipple discharge expressed  Assessment/Plan     assessment and plan:  Hypertension:  Check CMP, lipid panel, CBC.  Schedule mammogram.  Discussed vascular appointment, renal imaging, bone density, cardiology appointment, Pap smear, pelvic exam, colonoscopy.  She declined all

## 2020-03-05 ENCOUNTER — HOSPITAL ENCOUNTER (INPATIENT)
Facility: HOSPITAL | Age: 85
LOS: 5 days | Discharge: HOME HEALTH | DRG: 280 | End: 2020-03-11
Attending: EMERGENCY MEDICINE | Admitting: HOSPITALIST
Payer: MEDICARE

## 2020-03-05 DIAGNOSIS — R06.02 SOB (SHORTNESS OF BREATH): ICD-10-CM

## 2020-03-05 DIAGNOSIS — R41.3 MEMORY DEFICITS: ICD-10-CM

## 2020-03-05 DIAGNOSIS — I25.10 CORONARY ARTERY DISEASE INVOLVING NATIVE CORONARY ARTERY OF NATIVE HEART WITHOUT ANGINA PECTORIS: Primary | ICD-10-CM

## 2020-03-05 DIAGNOSIS — I25.10 CORONARY ARTERY DISEASE, ANGINA PRESENCE UNSPECIFIED, UNSPECIFIED VESSEL OR LESION TYPE, UNSPECIFIED WHETHER NATIVE OR TRANSPLANTED HEART: ICD-10-CM

## 2020-03-05 DIAGNOSIS — D50.9 IRON DEFICIENCY ANEMIA, UNSPECIFIED IRON DEFICIENCY ANEMIA TYPE: ICD-10-CM

## 2020-03-05 DIAGNOSIS — I50.33 ACUTE ON CHRONIC DIASTOLIC HEART FAILURE: ICD-10-CM

## 2020-03-05 DIAGNOSIS — I77.9 CAROTID ARTERY DISEASE, UNSPECIFIED LATERALITY: ICD-10-CM

## 2020-03-05 DIAGNOSIS — I50.42 CHRONIC COMBINED SYSTOLIC AND DIASTOLIC HEART FAILURE: ICD-10-CM

## 2020-03-05 DIAGNOSIS — N18.30 CHRONIC RENAL FAILURE, STAGE 3 (MODERATE): ICD-10-CM

## 2020-03-05 DIAGNOSIS — I10 ESSENTIAL HYPERTENSION: ICD-10-CM

## 2020-03-05 DIAGNOSIS — I50.43 ACUTE ON CHRONIC COMBINED SYSTOLIC AND DIASTOLIC HEART FAILURE: ICD-10-CM

## 2020-03-05 DIAGNOSIS — R06.09 DOE (DYSPNEA ON EXERTION): ICD-10-CM

## 2020-03-05 LAB
ALBUMIN SERPL BCP-MCNC: 2.8 G/DL (ref 3.5–5.2)
ALP SERPL-CCNC: 107 U/L (ref 55–135)
ALT SERPL W/O P-5'-P-CCNC: 21 U/L (ref 10–44)
ANION GAP SERPL CALC-SCNC: 7 MMOL/L (ref 8–16)
AST SERPL-CCNC: 22 U/L (ref 10–40)
BACTERIA #/AREA URNS AUTO: ABNORMAL /HPF
BASOPHILS # BLD AUTO: 0.02 K/UL (ref 0–0.2)
BASOPHILS NFR BLD: 0.3 % (ref 0–1.9)
BILIRUB SERPL-MCNC: 0.7 MG/DL (ref 0.1–1)
BILIRUB UR QL STRIP: NEGATIVE
BNP SERPL-MCNC: 2664 PG/ML (ref 0–99)
BUN SERPL-MCNC: 25 MG/DL (ref 8–23)
CALCIUM SERPL-MCNC: 9.2 MG/DL (ref 8.7–10.5)
CHLORIDE SERPL-SCNC: 113 MMOL/L (ref 95–110)
CLARITY UR REFRACT.AUTO: CLEAR
CO2 SERPL-SCNC: 24 MMOL/L (ref 23–29)
COLOR UR AUTO: YELLOW
CREAT SERPL-MCNC: 1 MG/DL (ref 0.5–1.4)
DIFFERENTIAL METHOD: ABNORMAL
EOSINOPHIL # BLD AUTO: 0 K/UL (ref 0–0.5)
EOSINOPHIL NFR BLD: 0.3 % (ref 0–8)
ERYTHROCYTE [DISTWIDTH] IN BLOOD BY AUTOMATED COUNT: 15.5 % (ref 11.5–14.5)
EST. GFR  (AFRICAN AMERICAN): >60 ML/MIN/1.73 M^2
EST. GFR  (NON AFRICAN AMERICAN): 52.2 ML/MIN/1.73 M^2
GLUCOSE SERPL-MCNC: 137 MG/DL (ref 70–110)
GLUCOSE UR QL STRIP: NEGATIVE
HCT VFR BLD AUTO: 27.2 % (ref 37–48.5)
HGB BLD-MCNC: 7.7 G/DL (ref 12–16)
HGB UR QL STRIP: NEGATIVE
HYALINE CASTS UR QL AUTO: 8 /LPF
IMM GRANULOCYTES # BLD AUTO: 0.03 K/UL (ref 0–0.04)
IMM GRANULOCYTES NFR BLD AUTO: 0.4 % (ref 0–0.5)
KETONES UR QL STRIP: NEGATIVE
LEUKOCYTE ESTERASE UR QL STRIP: NEGATIVE
LYMPHOCYTES # BLD AUTO: 0.9 K/UL (ref 1–4.8)
LYMPHOCYTES NFR BLD: 12.8 % (ref 18–48)
MCH RBC QN AUTO: 24.4 PG (ref 27–31)
MCHC RBC AUTO-ENTMCNC: 28.3 G/DL (ref 32–36)
MCV RBC AUTO: 86 FL (ref 82–98)
MICROSCOPIC COMMENT: ABNORMAL
MONOCYTES # BLD AUTO: 0.5 K/UL (ref 0.3–1)
MONOCYTES NFR BLD: 7.4 % (ref 4–15)
NEUTROPHILS # BLD AUTO: 5.4 K/UL (ref 1.8–7.7)
NEUTROPHILS NFR BLD: 78.8 % (ref 38–73)
NITRITE UR QL STRIP: NEGATIVE
NRBC BLD-RTO: 0 /100 WBC
PH UR STRIP: 5 [PH] (ref 5–8)
PLATELET # BLD AUTO: 254 K/UL (ref 150–350)
PMV BLD AUTO: 11.4 FL (ref 9.2–12.9)
POTASSIUM SERPL-SCNC: 4.2 MMOL/L (ref 3.5–5.1)
PROT SERPL-MCNC: 6.1 G/DL (ref 6–8.4)
PROT UR QL STRIP: ABNORMAL
RBC # BLD AUTO: 3.15 M/UL (ref 4–5.4)
RBC #/AREA URNS AUTO: 2 /HPF (ref 0–4)
SODIUM SERPL-SCNC: 144 MMOL/L (ref 136–145)
SP GR UR STRIP: 1.02 (ref 1–1.03)
SQUAMOUS #/AREA URNS AUTO: 1 /HPF
TROPONIN I SERPL DL<=0.01 NG/ML-MCNC: 0.07 NG/ML (ref 0–0.03)
URN SPEC COLLECT METH UR: ABNORMAL
WBC # BLD AUTO: 6.9 K/UL (ref 3.9–12.7)
WBC #/AREA URNS AUTO: 1 /HPF (ref 0–5)

## 2020-03-05 PROCEDURE — 99285 EMERGENCY DEPT VISIT HI MDM: CPT | Mod: 25

## 2020-03-05 PROCEDURE — 99285 PR EMERGENCY DEPT VISIT,LEVEL V: ICD-10-PCS | Mod: ,,, | Performed by: EMERGENCY MEDICINE

## 2020-03-05 PROCEDURE — 93010 ELECTROCARDIOGRAM REPORT: CPT | Mod: ,,, | Performed by: INTERNAL MEDICINE

## 2020-03-05 PROCEDURE — 85025 COMPLETE CBC W/AUTO DIFF WBC: CPT

## 2020-03-05 PROCEDURE — 81001 URINALYSIS AUTO W/SCOPE: CPT

## 2020-03-05 PROCEDURE — 96374 THER/PROPH/DIAG INJ IV PUSH: CPT

## 2020-03-05 PROCEDURE — 51798 US URINE CAPACITY MEASURE: CPT

## 2020-03-05 PROCEDURE — 93005 ELECTROCARDIOGRAM TRACING: CPT

## 2020-03-05 PROCEDURE — 84484 ASSAY OF TROPONIN QUANT: CPT

## 2020-03-05 PROCEDURE — 80053 COMPREHEN METABOLIC PANEL: CPT

## 2020-03-05 PROCEDURE — 93010 EKG 12-LEAD: ICD-10-PCS | Mod: ,,, | Performed by: INTERNAL MEDICINE

## 2020-03-05 PROCEDURE — 99285 EMERGENCY DEPT VISIT HI MDM: CPT | Mod: ,,, | Performed by: EMERGENCY MEDICINE

## 2020-03-05 PROCEDURE — 83880 ASSAY OF NATRIURETIC PEPTIDE: CPT

## 2020-03-06 PROBLEM — N18.30 CHRONIC RENAL FAILURE, STAGE 3 (MODERATE): Status: ACTIVE | Noted: 2020-03-06

## 2020-03-06 PROBLEM — I50.33 ACUTE ON CHRONIC DIASTOLIC HEART FAILURE: Status: ACTIVE | Noted: 2020-03-06

## 2020-03-06 PROBLEM — R79.89 ELEVATED TROPONIN I LEVEL: Status: ACTIVE | Noted: 2020-03-06

## 2020-03-06 PROBLEM — D64.9 ANEMIA: Status: ACTIVE | Noted: 2020-03-06

## 2020-03-06 PROBLEM — R41.3 MEMORY DEFICITS: Status: ACTIVE | Noted: 2020-03-06

## 2020-03-06 PROBLEM — R06.02 SOB (SHORTNESS OF BREATH): Status: ACTIVE | Noted: 2020-03-06

## 2020-03-06 LAB
ANION GAP SERPL CALC-SCNC: 7 MMOL/L (ref 8–16)
ASCENDING AORTA: 3.34 CM
BASOPHILS # BLD AUTO: 0.01 K/UL (ref 0–0.2)
BASOPHILS NFR BLD: 0.2 % (ref 0–1.9)
BSA FOR ECHO PROCEDURE: 1.81 M2
BUN SERPL-MCNC: 23 MG/DL (ref 8–23)
CALCIUM SERPL-MCNC: 9.2 MG/DL (ref 8.7–10.5)
CHLORIDE SERPL-SCNC: 110 MMOL/L (ref 95–110)
CHOLEST SERPL-MCNC: 94 MG/DL (ref 120–199)
CHOLEST/HDLC SERPL: 2.7 {RATIO} (ref 2–5)
CO2 SERPL-SCNC: 26 MMOL/L (ref 23–29)
CREAT SERPL-MCNC: 0.9 MG/DL (ref 0.5–1.4)
CV ECHO LV RWT: 0.41 CM
DIFFERENTIAL METHOD: ABNORMAL
DOP CALC LVOT AREA: 3.2 CM2
DOP CALC LVOT DIAMETER: 2.02 CM
DOP CALC LVOT PEAK VEL: 0.72 M/S
DOP CALC LVOT STROKE VOLUME: 43.59 CM3
DOP CALCLVOT PEAK VEL VTI: 13.61 CM
E WAVE DECELERATION TIME: 223.84 MSEC
E/A RATIO: 1.6
E/E' RATIO: 27.27 M/S
ECHO LV POSTERIOR WALL: 1.13 CM (ref 0.6–1.1)
EOSINOPHIL # BLD AUTO: 0 K/UL (ref 0–0.5)
EOSINOPHIL NFR BLD: 0.7 % (ref 0–8)
ERYTHROCYTE [DISTWIDTH] IN BLOOD BY AUTOMATED COUNT: 15.4 % (ref 11.5–14.5)
EST. GFR  (AFRICAN AMERICAN): >60 ML/MIN/1.73 M^2
EST. GFR  (NON AFRICAN AMERICAN): 59.3 ML/MIN/1.73 M^2
FERRITIN SERPL-MCNC: 63 NG/ML (ref 20–300)
FOLATE SERPL-MCNC: 9.1 NG/ML (ref 4–24)
FRACTIONAL SHORTENING: 15 % (ref 28–44)
GLUCOSE SERPL-MCNC: 96 MG/DL (ref 70–110)
HCT VFR BLD AUTO: 26.1 % (ref 37–48.5)
HDLC SERPL-MCNC: 35 MG/DL (ref 40–75)
HDLC SERPL: 37.2 % (ref 20–50)
HGB BLD-MCNC: 7.6 G/DL (ref 12–16)
IMM GRANULOCYTES # BLD AUTO: 0.02 K/UL (ref 0–0.04)
IMM GRANULOCYTES NFR BLD AUTO: 0.3 % (ref 0–0.5)
INTERVENTRICULAR SEPTUM: 1.37 CM (ref 0.6–1.1)
IRON SERPL-MCNC: 16 UG/DL (ref 30–160)
LA MAJOR: 7.23 CM
LA MINOR: 4.87 CM
LA WIDTH: 4.57 CM
LDLC SERPL CALC-MCNC: 48.2 MG/DL (ref 63–159)
LEFT ATRIUM SIZE: 4.77 CM
LEFT ATRIUM VOLUME INDEX: 61.2 ML/M2
LEFT ATRIUM VOLUME: 107.84 CM3
LEFT INTERNAL DIMENSION IN SYSTOLE: 4.68 CM (ref 2.1–4)
LEFT VENTRICLE DIASTOLIC VOLUME INDEX: 84.52 ML/M2
LEFT VENTRICLE DIASTOLIC VOLUME: 148.87 ML
LEFT VENTRICLE MASS INDEX: 165 G/M2
LEFT VENTRICLE SYSTOLIC VOLUME INDEX: 57.5 ML/M2
LEFT VENTRICLE SYSTOLIC VOLUME: 101.33 ML
LEFT VENTRICULAR INTERNAL DIMENSION IN DIASTOLE: 5.52 CM (ref 3.5–6)
LEFT VENTRICULAR MASS: 289.85 G
LV LATERAL E/E' RATIO: 25 M/S
LV SEPTAL E/E' RATIO: 30 M/S
LYMPHOCYTES # BLD AUTO: 1.5 K/UL (ref 1–4.8)
LYMPHOCYTES NFR BLD: 25 % (ref 18–48)
MAGNESIUM SERPL-MCNC: 2.2 MG/DL (ref 1.6–2.6)
MCH RBC QN AUTO: 24.7 PG (ref 27–31)
MCHC RBC AUTO-ENTMCNC: 29.1 G/DL (ref 32–36)
MCV RBC AUTO: 85 FL (ref 82–98)
MONOCYTES # BLD AUTO: 0.5 K/UL (ref 0.3–1)
MONOCYTES NFR BLD: 7.9 % (ref 4–15)
MV PEAK A VEL: 0.94 M/S
MV PEAK E VEL: 1.5 M/S
NEUTROPHILS # BLD AUTO: 3.9 K/UL (ref 1.8–7.7)
NEUTROPHILS NFR BLD: 65.9 % (ref 38–73)
NONHDLC SERPL-MCNC: 59 MG/DL
NRBC BLD-RTO: 0 /100 WBC
PHOSPHATE SERPL-MCNC: 3 MG/DL (ref 2.7–4.5)
PISA TR MAX VEL: 2.97 M/S
PLATELET # BLD AUTO: 245 K/UL (ref 150–350)
PMV BLD AUTO: 11.4 FL (ref 9.2–12.9)
POTASSIUM SERPL-SCNC: 4.1 MMOL/L (ref 3.5–5.1)
RA MAJOR: 4.48 CM
RA PRESSURE: 3 MMHG
RA WIDTH: 3.37 CM
RBC # BLD AUTO: 3.08 M/UL (ref 4–5.4)
RIGHT VENTRICULAR END-DIASTOLIC DIMENSION: 3.15 CM
SATURATED IRON: 4 % (ref 20–50)
SINUS: 3.33 CM
SODIUM SERPL-SCNC: 143 MMOL/L (ref 136–145)
STJ: 2.99 CM
T4 FREE SERPL-MCNC: 0.99 NG/DL (ref 0.71–1.51)
TDI LATERAL: 0.06 M/S
TDI SEPTAL: 0.05 M/S
TDI: 0.06 M/S
TOTAL IRON BINDING CAPACITY: 369 UG/DL (ref 250–450)
TR MAX PG: 35 MMHG
TRANSFERRIN SERPL-MCNC: 249 MG/DL (ref 200–375)
TRANSFERRIN SERPL-MCNC: 249 MG/DL (ref 200–375)
TRICUSPID ANNULAR PLANE SYSTOLIC EXCURSION: 1.48 CM
TRIGL SERPL-MCNC: 54 MG/DL (ref 30–150)
TROPONIN I SERPL DL<=0.01 NG/ML-MCNC: 0.08 NG/ML (ref 0–0.03)
TROPONIN I SERPL DL<=0.01 NG/ML-MCNC: 0.09 NG/ML (ref 0–0.03)
TSH SERPL DL<=0.005 MIU/L-ACNC: 0.34 UIU/ML (ref 0.4–4)
TV REST PULMONARY ARTERY PRESSURE: 38 MMHG
VIT B12 SERPL-MCNC: 294 PG/ML (ref 210–950)
WBC # BLD AUTO: 5.93 K/UL (ref 3.9–12.7)

## 2020-03-06 PROCEDURE — 80048 BASIC METABOLIC PNL TOTAL CA: CPT

## 2020-03-06 PROCEDURE — 99223 PR INITIAL HOSPITAL CARE,LEVL III: ICD-10-PCS | Mod: AI,,, | Performed by: PHYSICIAN ASSISTANT

## 2020-03-06 PROCEDURE — 97161 PT EVAL LOW COMPLEX 20 MIN: CPT

## 2020-03-06 PROCEDURE — 83540 ASSAY OF IRON: CPT

## 2020-03-06 PROCEDURE — 25000003 PHARM REV CODE 250: Performed by: INTERNAL MEDICINE

## 2020-03-06 PROCEDURE — 84100 ASSAY OF PHOSPHORUS: CPT

## 2020-03-06 PROCEDURE — 25000003 PHARM REV CODE 250: Performed by: PHYSICIAN ASSISTANT

## 2020-03-06 PROCEDURE — 97165 OT EVAL LOW COMPLEX 30 MIN: CPT

## 2020-03-06 PROCEDURE — 82746 ASSAY OF FOLIC ACID SERUM: CPT

## 2020-03-06 PROCEDURE — 84439 ASSAY OF FREE THYROXINE: CPT

## 2020-03-06 PROCEDURE — 80061 LIPID PANEL: CPT

## 2020-03-06 PROCEDURE — 99223 1ST HOSP IP/OBS HIGH 75: CPT | Mod: AI,,, | Performed by: PHYSICIAN ASSISTANT

## 2020-03-06 PROCEDURE — 63600175 PHARM REV CODE 636 W HCPCS: Performed by: EMERGENCY MEDICINE

## 2020-03-06 PROCEDURE — 82607 VITAMIN B-12: CPT

## 2020-03-06 PROCEDURE — 36415 COLL VENOUS BLD VENIPUNCTURE: CPT

## 2020-03-06 PROCEDURE — 20600001 HC STEP DOWN PRIVATE ROOM

## 2020-03-06 PROCEDURE — 84484 ASSAY OF TROPONIN QUANT: CPT | Mod: 91

## 2020-03-06 PROCEDURE — 97116 GAIT TRAINING THERAPY: CPT

## 2020-03-06 PROCEDURE — 85025 COMPLETE CBC W/AUTO DIFF WBC: CPT

## 2020-03-06 PROCEDURE — 82728 ASSAY OF FERRITIN: CPT

## 2020-03-06 PROCEDURE — 84443 ASSAY THYROID STIM HORMONE: CPT

## 2020-03-06 PROCEDURE — 63600175 PHARM REV CODE 636 W HCPCS: Performed by: PHYSICIAN ASSISTANT

## 2020-03-06 PROCEDURE — 83735 ASSAY OF MAGNESIUM: CPT

## 2020-03-06 RX ORDER — FUROSEMIDE 10 MG/ML
40 INJECTION INTRAMUSCULAR; INTRAVENOUS 2 TIMES DAILY
Status: DISCONTINUED | OUTPATIENT
Start: 2020-03-06 | End: 2020-03-07

## 2020-03-06 RX ORDER — SODIUM CHLORIDE 0.9 % (FLUSH) 0.9 %
5 SYRINGE (ML) INJECTION
Status: DISCONTINUED | OUTPATIENT
Start: 2020-03-06 | End: 2020-03-06

## 2020-03-06 RX ORDER — ACETAMINOPHEN 325 MG/1
650 TABLET ORAL EVERY 4 HOURS PRN
Status: DISCONTINUED | OUTPATIENT
Start: 2020-03-06 | End: 2020-03-11 | Stop reason: HOSPADM

## 2020-03-06 RX ORDER — IPRATROPIUM BROMIDE AND ALBUTEROL SULFATE 2.5; .5 MG/3ML; MG/3ML
3 SOLUTION RESPIRATORY (INHALATION) EVERY 4 HOURS PRN
Status: DISCONTINUED | OUTPATIENT
Start: 2020-03-06 | End: 2020-03-11 | Stop reason: HOSPADM

## 2020-03-06 RX ORDER — IBUPROFEN 200 MG
24 TABLET ORAL
Status: DISCONTINUED | OUTPATIENT
Start: 2020-03-06 | End: 2020-03-11 | Stop reason: HOSPADM

## 2020-03-06 RX ORDER — AMOXICILLIN 250 MG
1 CAPSULE ORAL 2 TIMES DAILY PRN
Status: DISCONTINUED | OUTPATIENT
Start: 2020-03-06 | End: 2020-03-11 | Stop reason: HOSPADM

## 2020-03-06 RX ORDER — SODIUM CHLORIDE 0.9 % (FLUSH) 0.9 %
10 SYRINGE (ML) INJECTION
Status: DISCONTINUED | OUTPATIENT
Start: 2020-03-06 | End: 2020-03-11 | Stop reason: HOSPADM

## 2020-03-06 RX ORDER — ASPIRIN 81 MG/1
81 TABLET ORAL DAILY
Status: DISCONTINUED | OUTPATIENT
Start: 2020-03-06 | End: 2020-03-11 | Stop reason: HOSPADM

## 2020-03-06 RX ORDER — POLYETHYLENE GLYCOL 3350 17 G/17G
17 POWDER, FOR SOLUTION ORAL DAILY
Status: DISCONTINUED | OUTPATIENT
Start: 2020-03-06 | End: 2020-03-11 | Stop reason: HOSPADM

## 2020-03-06 RX ORDER — KETOROLAC TROMETHAMINE 30 MG/ML
15 INJECTION, SOLUTION INTRAMUSCULAR; INTRAVENOUS EVERY 6 HOURS PRN
Status: DISPENSED | OUTPATIENT
Start: 2020-03-06 | End: 2020-03-09

## 2020-03-06 RX ORDER — ATORVASTATIN CALCIUM 20 MG/1
80 TABLET, FILM COATED ORAL DAILY
Status: DISCONTINUED | OUTPATIENT
Start: 2020-03-06 | End: 2020-03-11 | Stop reason: HOSPADM

## 2020-03-06 RX ORDER — GLUCAGON 1 MG
1 KIT INJECTION
Status: DISCONTINUED | OUTPATIENT
Start: 2020-03-06 | End: 2020-03-11 | Stop reason: HOSPADM

## 2020-03-06 RX ORDER — LISINOPRIL 20 MG/1
40 TABLET ORAL DAILY
Status: DISCONTINUED | OUTPATIENT
Start: 2020-03-06 | End: 2020-03-06

## 2020-03-06 RX ORDER — FERROUS SULFATE 325(65) MG
325 TABLET, DELAYED RELEASE (ENTERIC COATED) ORAL DAILY
Status: DISCONTINUED | OUTPATIENT
Start: 2020-03-06 | End: 2020-03-11 | Stop reason: HOSPADM

## 2020-03-06 RX ORDER — CLOPIDOGREL BISULFATE 75 MG/1
75 TABLET ORAL DAILY
Status: DISCONTINUED | OUTPATIENT
Start: 2020-03-06 | End: 2020-03-08

## 2020-03-06 RX ORDER — TALC
6 POWDER (GRAM) TOPICAL NIGHTLY PRN
Status: DISCONTINUED | OUTPATIENT
Start: 2020-03-06 | End: 2020-03-11 | Stop reason: HOSPADM

## 2020-03-06 RX ORDER — IBUPROFEN 200 MG
16 TABLET ORAL
Status: DISCONTINUED | OUTPATIENT
Start: 2020-03-06 | End: 2020-03-11 | Stop reason: HOSPADM

## 2020-03-06 RX ORDER — FUROSEMIDE 10 MG/ML
80 INJECTION INTRAMUSCULAR; INTRAVENOUS
Status: COMPLETED | OUTPATIENT
Start: 2020-03-06 | End: 2020-03-06

## 2020-03-06 RX ORDER — FUROSEMIDE 10 MG/ML
40 INJECTION INTRAMUSCULAR; INTRAVENOUS DAILY
Status: DISCONTINUED | OUTPATIENT
Start: 2020-03-06 | End: 2020-03-06

## 2020-03-06 RX ORDER — ONDANSETRON 4 MG/1
4 TABLET, ORALLY DISINTEGRATING ORAL EVERY 8 HOURS PRN
Status: DISCONTINUED | OUTPATIENT
Start: 2020-03-06 | End: 2020-03-11 | Stop reason: HOSPADM

## 2020-03-06 RX ORDER — AMLODIPINE BESYLATE 10 MG/1
10 TABLET ORAL DAILY
Status: DISCONTINUED | OUTPATIENT
Start: 2020-03-06 | End: 2020-03-08

## 2020-03-06 RX ADMIN — ATORVASTATIN CALCIUM 80 MG: 20 TABLET, FILM COATED ORAL at 11:03

## 2020-03-06 RX ADMIN — CLOPIDOGREL BISULFATE 75 MG: 75 TABLET ORAL at 11:03

## 2020-03-06 RX ADMIN — FUROSEMIDE 40 MG: 10 INJECTION, SOLUTION INTRAMUSCULAR; INTRAVENOUS at 06:03

## 2020-03-06 RX ADMIN — FUROSEMIDE 80 MG: 10 INJECTION, SOLUTION INTRAMUSCULAR; INTRAVENOUS at 01:03

## 2020-03-06 RX ADMIN — POLYETHYLENE GLYCOL 3350 17 G: 17 POWDER, FOR SOLUTION ORAL at 11:03

## 2020-03-06 RX ADMIN — AMLODIPINE BESYLATE 10 MG: 10 TABLET ORAL at 11:03

## 2020-03-06 RX ADMIN — FERROUS SULFATE TAB EC 325 MG (65 MG FE EQUIVALENT) 325 MG: 325 (65 FE) TABLET DELAYED RESPONSE at 11:03

## 2020-03-06 RX ADMIN — ASPIRIN 81 MG: 81 TABLET, COATED ORAL at 11:03

## 2020-03-06 NOTE — ASSESSMENT & PLAN NOTE
Controlled  - hold hctz for now  - hold lisinopril 40 mg PO daily as she is normotensive and unclear if compliant with home meds  - c/w amlodipine 10 mg PO daily

## 2020-03-06 NOTE — ASSESSMENT & PLAN NOTE
Likely has unspecified vs vascular dementia  - PT/OT consulted for debility  - delirium precautions in place, fall precautions

## 2020-03-06 NOTE — HPI
"Ciara Lozano is a 84 y.o. F with CAD, HFpEF, HLD, HTN, h/o noncompliance and syncope who [place] [facility] [place] onset B[place] edema x2 days with associated SOB. Patient is poor historian and no family at bedside for collateral.   Patient reports lower extremity swelling for the past few days but denies any orthopnea, PND, cough, wheezing, chest tightness, weight gain.  She can tell me she is on aspirin but is not aware of any of her other medications and states " I don't know" when asked her medical problems.  Patient does not wear oxygen at home and is not on a daily lasix, per chart review.     ED: Initially tachycardic to 130s, satting 76% on RA. . Placed on 2L oxygen and improved to 100%. EKG shows known LBBB.  BNP 2600 (BL 100s-400s)/trop .068.  CXR PA/Lat shows bilateral pleural effusions and pulmonary edema.  Albumin 2.8, Hg 7.7 (down from BL 9-11 x1 year ago).  Given lasix 80 mg IVP.  "

## 2020-03-06 NOTE — PT/OT/SLP EVAL
"Occupational Therapy   Evaluation    Name: Ciara Lozano  MRN: [id number]  Admitting Diagnosis:  Acute on chronic diastolic heart failure      Recommendations:     Discharge Recommendations: home health OT  Discharge Equipment Recommendations:  bedside commode, walker, rolling, shower chair  Barriers to discharge:  None(Pt lives w/ older granddaughters who are available to assist as needed.)    Assessment:     Ciara Lozano is a 84 y.o. female with a medical diagnosis of Acute on chronic diastolic heart failure.Performance deficits affecting function: weakness, impaired self care skills, impaired functional mobilty, gait instability, impaired endurance, impaired balance, decreased safety awareness, edema, impaired cardiopulmonary response to activity.      Pt agreeable to participate in co-evaluation w/ PT/OT. Pt occasionally noted w/ SOB and taking breaks/pausing when answering questions/talking during session. Pt sitting on BSC upon entry.Pt required CGA w/ use of RW for standing to perform samuel care; A for managing gown only. Pt able to perform 6-8 steps w/ CGA and use of RW for transferring to w/c. Pt w/ reports that her granddaughter who performs all IADLs is "sick in the hospital," but will have assistance from 2 of her granddaughters whom she lives with once D/C home. Pt tolerated evaluation well and will continue to benefit from skilled OT intervention to increase balance, strength and endurance for indep in ADLs, functional transfers and functional mobility for returning to PLOF. Pt will benefit from recommended HHOT upon D/C to ensure safety and independence within the home.     Rehab Prognosis: Good; patient would benefit from acute skilled OT services to address these deficits and reach maximum level of function.       Plan:     Patient to be seen 3 x/week to address the above listed problems via self-care/home management, therapeutic activities, therapeutic exercises  · Plan of Care Expires: " "04/06/20  · Plan of Care Reviewed with: patient    Subjective     Chief Complaint: "This hospital is a mess."/ "My ankles are really swollen."  Patient/Family Comments/goals: "My granddaughters do a great job w/ helping me at home."     Occupational Profile:  Living Environment: Pt lives in [facility] w/ 3 steps to enter. Pt reports "there used to be" hand-rails, but is now unsure. Pt's bathroom is equipped w/ a tub and regular toilet. Pt reports she does not have a shower chair and "sits on the side" of the tub;pt also does not have a BSC. No grab bars installed.   Previous level of function: Pt reports she was mod I w/ ADLs and functional mobility w/ use of straight cane. Pt's granddaughter,who is sick in hospital, completes all IADLs. Pt is not driving.  Roles and Routines: None stated.   Equipment Used at Home:  cane, straight  Assistance upon Discharge: Pt will have assistance from 2 granddaughters upon D/C.     Pain/Comfort:  · Pain Rating 1: 0/10  · Pain Rating Post-Intervention 1: 0/10    Patients cultural, spiritual, Yarsanism conflicts given the current situation: no    Objective:     Communicated with: nurse prior to session.  Patient found on BSC with peripheral IV upon OT entry to room.    General Precautions: Standard, fall   Orthopedic Precautions:N/A   Braces: N/A     Occupational Performance:    Bed Mobility:    · Not tested this date. Pt on BSC upon entry.    Functional Mobility/Transfers:  · Patient completed Sit <> Stand Transfer with contact guard assistance  with  rolling walker   · Functional Mobility: Pt able to perform 6-8 steps w/ CGA and use of RW for transferring to w/c. No LOB noted.     Activities of Daily Living:  · Lower Body Dressing: not attempted secondary to pt noted w/ moderate swelling in BLEs; non-slip socks would not comfortably fit pt's LEs; pt w/ reports that she requires assist for threading socks when at home   · Feeding: set-up assistance while seated in w/c    Cognitive/Visual " Perceptual:  Cognitive/Psychosocial Skills:     -       Oriented to: Person, Place, Time and Situation   -       Follows Commands/attention:Follows two-step commands  -       Communication: clear/fluent but noted w/ SOB in between words/sentences  -       Safety awareness/insight to disability: intact   -       Mood/Affect/Coping skills/emotional control: Appropriate to situation    Physical Exam:  Balance:    -       CGA for standing dynamic balance  Postural examination/scapula alignment:    -       Rounded shoulders  -       Forward head  Skin integrity: Visible skin intact  Edema:  Moderate in BLEs; RLE > LLE  Sensation:    -       Intact  Upper Extremity Range of Motion:     -       Right Upper Extremity: WFL  -       Left Upper Extremity: WFL  Upper Extremity Strength:    -       Right Upper Extremity: WFL  -       Left Upper Extremity: WFL   Strength:    -       Right Upper Extremity: WFL  -       Left Upper Extremity: WFL  Fine Motor Coordination:    -       Intact  Left hand thumb/finger opposition skills and Right hand thumb/finger opposition skills  Gross motor coordination:   WFL    AMPAC 6 Click ADL:  AMPAC Total Score: 17    Treatment & Education:  -Pt educated on role of OT, POC, and safety when performing ADLs, functional mobility and functional transfers.  -Pt encouraged to perform OOB activity to reduce complications related to excessive bed rest.   -Pt educated on energy conservation technique to prevent SOB.   -Pt educated on importance of requesting assistance from staff prior to ambulating and performing transfers.   Education:    Patient left in w/c with call button in reach    GOALS:   Multidisciplinary Problems     Occupational Therapy Goals        Problem: Occupational Therapy Goal    Goal Priority Disciplines Outcome Interventions   Occupational Therapy Goal     OT, PT/OT Ongoing, Progressing    Description:  Goals to be met by: 3/13/2020    Patient will increase functional  independence with ADLs by performing:    Feeding with Modified [place].  UE Dressing with Set-up Assistance.  LE Dressing with Stand-by Assistance w/ use of AE.  Grooming while standing with Stand-by Assistance.  Toileting from bedside commode with Stand-by Assistance for hygiene and clothing management.   Toilet transfer to bedside commode with Stand-by Assistance.                      History:     Past Medical History:   Diagnosis Date    CAD (coronary artery disease) 8/12/2014    Carotid artery disease     Congestive heart failure, unspecified     Encounter for blood transfusion     Hemangioma 4/2/2013    Hyperlipidemia     Hypertension     Peripheral vascular disease     Syncope and collapse        Past Surgical History:   Procedure Laterality Date    CARDIAC CATHETERIZATION      cataracts      CORONARY ANGIOPLASTY         Time Tracking:     OT Date of Treatment: 03/06/20  OT Start Time: 0827  OT Stop Time: 0842  OT Total Time (min): 15 min    Billable Minutes:Evaluation 15    JEREMIAH Austin  3/6/2020     I certify that I was present in the room directing the student in service delivery and guiding them using my skilled judgment. As the co-signing therapist I have reviewed the students documentation and am responsible for the treatment, assessment, and plan.

## 2020-03-06 NOTE — PT/OT/SLP EVAL
"Physical Therapy Evaluation    Patient Name:  Ciara Lozano   MRN:  [id number]    Recommendations:     Discharge Recommendations:  home health PT   Discharge Equipment Recommendations: bedside commode, walker, rolling, shower chair   Barriers to discharge: None    Assessment:     Ciara Lozano is a 84 y.o. female admitted with a medical diagnosis of Acute on chronic diastolic heart failure.  She presents with the following impairments/functional limitations:  weakness, impaired endurance, gait instability, impaired functional mobilty, edema, impaired balance . Patient transfers w/ RW and CGA. Ambulates w/ RW and CGA 2 feet (from BSC to w/c, in ED).    Rehab Prognosis: Good; patient would benefit from acute skilled PT services to address these deficits and reach maximum level of function.    Recent Surgery: * No surgery found *      Plan:     During this hospitalization, patient to be seen 3 x/week to address the identified rehab impairments via gait training, therapeutic activities, therapeutic exercises and progress toward the following goals:    · Plan of Care Expires:       Subjective     Chief Complaint: edema in feet  Patient/Family Comments/goals: return home to [facility]  Pain/Comfort:  · Pain Rating 1: 0/10  · Pain Rating Post-Intervention 1: 0/10    Patients cultural, spiritual, Christianity conflicts given the current situation:      Living Environment:  Patient lives w/ adult granddtrs in [facility] w/ 3 WILLIE; she is uncertain if there is a handrail, saying "there used to be".  Prior to admission, patients level of function was mod(I) w/ SPC.  Equipment used at home: cane, straight.  DME owned (not currently used): none.  Upon discharge, patient will have assistance from family.    Objective:     Communicated with nurse prior to session.  Patient found sitting on BSC with peripheral IV  upon PT entry to room.    General Precautions: Standard,     Orthopedic Precautions:N/A   Braces: N/A     Exams:  · Cognitive Exam:  " Patient is oriented to Person, Place, Time and Situation  · Gross Motor Coordination:  WFL  · RLE ROM: WFL  · RLE Strength: WFL  · LLE ROM: WFL  · LLE Strength: WFL  Marked edema BLE w/ right > left  Functional Mobility:  · Bed Mobility:  Not tested. Patient remained up in w/c, preparing to transfer from ED to 8085  · Transfers:     · Sit to Stand:  contact guard assistance with rolling walker from [facility], to w/c  · Gait: w/ RW and CGA 3 feet from BSC to w/c. No LOB. Cues for technique      Therapeutic Activities and Exercises:   patient educated on PT POC, gait and trf technique, call for assistance    AM-PAC 6 CLICK MOBILITY  Total Score:17     Patient left up in w/c with all lines intact, call button in reach and nurse notified.    GOALS:   Multidisciplinary Problems     Physical Therapy Goals        Problem: Physical Therapy Goal    Goal Priority Disciplines Outcome Goal Variances Interventions   Physical Therapy Goal     PT, PT/OT Ongoing, Progressing     Description:  Goals to be met by: 20     Patient will increase functional independence with mobility by performin. Supine to sit with Stand-by Assistance  2. Sit to stand transfer with Stand-by Assistance  3. Bed to chair transfer with Stand-by Assistance using Rolling Walker or LRAD  4. Gait  x 100 feet with Contact Guard Assistance using Rolling Walker. Or LRAD  5. Ascend/descend 3 stair with single Handrails Minimal Assistance .   6. Lower extremity exercise program x15 reps per handout, with assistance as needed                      History:     Past Medical History:   Diagnosis Date    CAD (coronary artery disease) 2014    Carotid artery disease     Congestive heart failure, unspecified     Encounter for blood transfusion     Hemangioma 2013    Hyperlipidemia     Hypertension     Peripheral vascular disease     Syncope and collapse        Past Surgical History:   Procedure Laterality Date    CARDIAC CATHETERIZATION       cataracts      CORONARY ANGIOPLASTY         Time Tracking:     PT Received On: 03/06/20  PT Start Time: 0829     PT Stop Time: 0843  PT Total Time (min): 14 min     Billable Minutes: Evaluation 14      Daxa Richardson, PT  03/06/2020

## 2020-03-06 NOTE — ASSESSMENT & PLAN NOTE
Hypervolemic on exam and imaging and BNP significantly elevated to 2600  - on RA on admit satting at 98  - Last echo 2014 with normal EF and diastolic dysfunction  - TTE ordered  - on hctz as OP  - unclear if patient has been compliant with her home meds and even states that the granddaughter she lives with is in the hospital.  Will need collateral from family, but they were here prior to admission and did not provide much more info to the ED.  - patient is lasix naive and was given 80 mg IVP of lasix in ED.  Will start lasix 40 mg IVP BID starting later this afternoon to further assess initial response symptomatically and renal function.    - monitor response with daily weights, strict I/Os, oxygen requirements  - Na restricted/fluid restricted diet 1500 ml

## 2020-03-06 NOTE — ED NOTES
Confirmed with Candace in war room telebox assigned and will be delivered soon. Report given to floor per Sarah REINA

## 2020-03-06 NOTE — H&P
"[facility] Hospital Medicine  History & Physical    Patient Name: Ciara Lozano  MRN: [id number]  Admission Date: 3/5/2020  Attending Physician: Tyesha Ambrose MD   Primary Care Provider: Andreina Gonzales MD    Hospital Medicine Team: [facility] MED D Ramon Patrick PA-C     Patient information was obtained from patient, past medical records and ER records.     Subjective:     Principal Problem:Acute on chronic diastolic heart failure    Chief Complaint:   Chief Complaint   Patient presents with    Leg Swelling     Pt reports BLE swelling that started 2 days ago. Pt ambulates with cane at baseline. Pts family states that pt "has been breathing hard after talking for a second or after she walks."        HPI: Ciara Lozano is a 84 y.o. F with CAD, HFpEF, HLD, HTN, h/o noncompliance and syncope who presents to [facility] ED for acute onset BLE edema x2 days with associated SOB. Patient is poor historian and no family at bedside for collateral.   Patient reports lower extremity swelling for the past few days but denies any orthopnea, PND, cough, wheezing, chest tightness, weight gain.  She can tell me she is on aspirin but is not aware of any of her other medications and states " I don't know" when asked her medical problems.  Patient does not wear oxygen at home and is not on a daily lasix, per chart review.     ED: Initially tachycardic to 130s, satting 76% on RA. . Placed on 2L oxygen and improved to 100%. EKG shows known LBBB.  BNP 2600 (BL 100s-400s)/trop .068.  CXR PA/Lat shows bilateral pleural effusions and pulmonary edema.  Albumin 2.8, Hg 7.7 (down from BL 9-11 x1 year ago).  Given lasix 80 mg IVP.    Past Medical History:   Diagnosis Date    CAD (coronary artery disease) 8/12/2014    Carotid artery disease     Congestive heart failure, unspecified     Encounter for blood transfusion     Hemangioma 4/2/2013    Hyperlipidemia     Hypertension     Peripheral vascular disease     Syncope " and collapse        Past Surgical History:   Procedure Laterality Date    CARDIAC CATHETERIZATION      cataracts      CORONARY ANGIOPLASTY         Review of patient's allergies indicates:   Allergen Reactions    Pollen extracts        No current facility-administered medications on file prior to encounter.      Current Outpatient Medications on File Prior to Encounter   Medication Sig    amLODIPine (NORVASC) 10 MG tablet TAKE 1 TABLET(10 MG) BY MOUTH EVERY DAY    aspirin (ECOTRIN) 81 MG EC tablet Take 81 mg by mouth once daily.    atorvastatin (LIPITOR) 80 MG tablet 1 po daily    clopidogrel (PLAVIX) 75 mg tablet 1 po daily    hydroCHLOROthiazide (HYDRODIURIL) 25 MG tablet TAKE 1 TABLET(25 MG) BY MOUTH EVERY DAY    lisinopril (PRINIVIL,ZESTRIL) 40 MG tablet 1 po daily    nitroGLYCERIN (NITROSTAT) 0.4 MG SL tablet Place 1 tablet (0.4 mg total) under the tongue every 5 (five) minutes as needed for Chest pain (no more tiana 3 doses).     Family History     Problem Relation (Age of Onset)    Anuerysm Sister    Hypertension Mother, Father    No Known Problems Maternal Grandmother, Maternal Grandfather, Paternal Grandmother, Paternal Grandfather, Brother, Sister, Maternal Aunt, Maternal Uncle, Paternal Aunt, Paternal Uncle        Tobacco Use    Smoking status: Former Smoker     Packs/day: 0.25     Years: 20.00     Pack years: 5.00     Last attempt to quit: 1995     Years since quittin.3   Substance and Sexual Activity    Alcohol use: No     Alcohol/week: 0.0 standard drinks     Comment: h/o regular ETOH use, now drinks only on holidays    Drug use: No    Sexual activity: Not Currently     Review of Systems   Constitutional: Negative for chills and fever.   HENT: Negative for congestion and rhinorrhea.    Eyes: Negative for photophobia and visual disturbance.   Respiratory: Positive for shortness of breath (per family). Negative for cough and chest tightness.    Cardiovascular: Positive for leg  swelling. Negative for chest pain and palpitations.   Gastrointestinal: Negative for abdominal pain, diarrhea, nausea and vomiting.   Genitourinary: Negative for difficulty urinating and dysuria.   Musculoskeletal: Negative for back pain and gait problem.   Skin: Negative for rash and wound.   Neurological: Negative for dizziness and headaches.   Psychiatric/Behavioral: Negative for agitation and confusion.     Objective:     Vital Signs (Most Recent):  Temp: 97.8 °F (36.6 °C) (03/06/20 0142)  Pulse: 76 (03/06/20 0250)  Resp: 20 (03/06/20 0250)  BP: (!) 130/57 (03/06/20 0250)  SpO2: 100 % (03/06/20 0250) Vital Signs (24h Range):  Temp:  [97.6 °F (36.4 °C)-97.8 °F (36.6 °C)] 97.8 °F (36.6 °C)  Pulse:  [] 76  Resp:  [14-20] 20  SpO2:  [76 %-100 %] 100 %  BP: (115-144)/(57-73) 130/57     Weight: 75 kg (165 lb 5.5 oz)  Body mass index is 30.24 kg/m².    Physical Exam   Constitutional: She appears well-developed and well-nourished.   HENT:   Head: Normocephalic and atraumatic.   Eyes: Pupils are equal, round, and reactive to light. EOM are normal.   Neck: Normal range of motion. Neck supple. JVD present.   Cardiovascular: Normal rate, regular rhythm, normal heart sounds and intact distal pulses.   Pulmonary/Chest: Effort normal and breath sounds normal.   Abdominal: Soft. Bowel sounds are normal.   Musculoskeletal: Normal range of motion. She exhibits edema (3+ pitting edema up to lower thighs).   Neurological: She is alert.   Oriented to person, placen situation, but not time/president   Skin: Skin is warm and dry.   Psychiatric: She has a normal mood and affect. Her speech is normal. Judgment and thought content normal. She is slowed. Cognition and memory are impaired (mild).   Nursing note and vitals reviewed.        CRANIAL NERVES     CN III, IV, VI   Pupils are equal, round, and reactive to light.  Extraocular motions are normal.        Significant Labs:   CBC:   Recent Labs   Lab 03/05/20  2133   WBC 6.90    HGB 7.7*   HCT 27.2*        CMP:   Recent Labs   Lab 03/05/20  2133      K 4.2   *   CO2 24   *   BUN 25*   CREATININE 1.0   CALCIUM 9.2   PROT 6.1   ALBUMIN 2.8*   BILITOT 0.7   ALKPHOS 107   AST 22   ALT 21   ANIONGAP 7*   EGFRNONAA 52.2*       Significant Imaging: I have reviewed all pertinent imaging results/findings within the past 24 hours.    Assessment/Plan:     * Acute on chronic diastolic heart failure  Hypervolemic on exam and imaging and BNP significantly elevated to 2600  - on RA on admit satting at 98  - Last echo 2014 with normal EF and diastolic dysfunction  - TTE ordered  - on hctz as OP  - unclear if patient has been compliant with her home meds and even states that the granddaughter she lives with is in the hospital.  Will need collateral from family, but they were here prior to admission and did not provide much more info to the ED.  - patient is lasix naive and was given 80 mg IVP of lasix in ED.  Will start lasix 40 mg IVP BID starting later this afternoon to further assess initial response symptomatically and renal function.    - monitor response with daily weights, strict I/Os, oxygen requirements  - Na restricted/fluid restricted diet 1500 ml    Anemia  Hg 7.7, down from baseline of 9-11 last year  - this is likely from poor intake and no acute blood loss as albumin is also lower and no acute GI/bleeding sxs reported  - checking iron studies, TSH, B12, folate    CAD (coronary artery disease)  Carotid artery disease  Hyperlipidemia LDL goal < 100  - c/w DAPT, statin  - last lipid studies 5/2019 benign    Hypertension  Controlled  - hold hctz for now  - hold lisinopril 40 mg PO daily as she is normotensive and unclear if compliant with home meds  - c/w amlodipine 10 mg PO daily    Memory deficits  Likely has unspecified vs vascular dementia  - PT/OT consulted for debility  - delirium precautions in place, fall precautions    Chronic renal failure, stage 3  (moderate)  Chronic and stable    LBBB (left bundle branch block)  Chronic and stable    Elevated troponin I level  likely type II NSTEMI from demand/CHF exacerbation  - trend trop    VTE Risk Mitigation (From admission, onward)         Ordered     IP VTE HIGH RISK PATIENT  Once      03/06/20 0211     Place ZORAIDA hose  Until discontinued      03/06/20 0211     Place sequential compression device  Until discontinued      03/06/20 0211                   Ramon Patrick PA-C  Department of Hospital Medicine   [facility]

## 2020-03-06 NOTE — PLAN OF CARE
Problem: Physical Therapy Goal  Goal: Physical Therapy Goal  Description  Goals to be met by: 20     Patient will increase functional independence with mobility by performin. Supine to sit with Stand-by Assistance  2. Sit to stand transfer with Stand-by Assistance  3. Bed to chair transfer with Stand-by Assistance using Rolling Walker or LRAD  4. Gait  x 100 feet with Contact Guard Assistance using Rolling Walker. Or LRAD  5. Ascend/descend 3 stair with single Handrails Minimal Assistance .   6. Lower extremity exercise program x15 reps per handout, with assistance as needed     PT jarred completed. Daxa Richardson, PT 3/6/2020

## 2020-03-06 NOTE — ASSESSMENT & PLAN NOTE
Carotid artery disease  Hyperlipidemia LDL goal < 100  - c/w DAPT, statin  - last lipid studies 5/2019 benign

## 2020-03-06 NOTE — NURSING
Patient rec'd to room via WC. No s/s of acute distress. Patient able to stand up and ambulate to bed. O2 at 1 liter started.

## 2020-03-06 NOTE — ASSESSMENT & PLAN NOTE
Hg 7.7, down from baseline of 9-11 last year  - this is likely from poor intake and no acute blood loss as albumin is also lower and no acute GI/bleeding sxs reported  - checking iron studies, TSH, B12, folate

## 2020-03-06 NOTE — ED NOTES
Pt sleeping in bed on continuous pulseox, cardiac monitor, and bp cuff. Call light within reach. Will continue to monitor.

## 2020-03-06 NOTE — PLAN OF CARE
Problem: Occupational Therapy Goal  Goal: Occupational Therapy Goal  Description  Goals to be met by: 3/13/2020    Patient will increase functional independence with ADLs by performing:    Feeding with Modified [place].  UE Dressing with Set-up Assistance.  LE Dressing with Stand-by Assistance w/ use of AE.  Grooming while standing with Stand-by Assistance.  Toileting from bedside commode with Stand-by Assistance for hygiene and clothing management.   Toilet transfer to bedside commode with Stand-by Assistance.     Outcome: Ongoing, Progressing    I certify that I was present in the room directing the student in service delivery and guiding them using my skilled judgment. As the co-signing therapist I have reviewed the students documentation and am responsible for the treatment, assessment, and plan.

## 2020-03-06 NOTE — ED NOTES
Patient resting in stretcher and is in NAD at this time. Bed low and locked, SR up x2, call bell in patient reach. Pt remains on continuous cardiac monitor, continuous pulse ox, and auto BP cuff. Pt voices no needs at this time.

## 2020-03-06 NOTE — SUBJECTIVE & OBJECTIVE
Past Medical History:   Diagnosis Date    CAD (coronary artery disease) 2014    Carotid artery disease     Congestive heart failure, unspecified     Encounter for blood transfusion     Hemangioma 2013    Hyperlipidemia     Hypertension     Peripheral vascular disease     Syncope and collapse        Past Surgical History:   Procedure Laterality Date    CARDIAC CATHETERIZATION      cataracts      CORONARY ANGIOPLASTY         Review of patient's allergies indicates:   Allergen Reactions    Pollen extracts        No current facility-administered medications on file prior to encounter.      Current Outpatient Medications on File Prior to Encounter   Medication Sig    amLODIPine (NORVASC) 10 MG tablet TAKE 1 TABLET(10 MG) BY MOUTH EVERY DAY    aspirin (ECOTRIN) 81 MG EC tablet Take 81 mg by mouth once daily.    atorvastatin (LIPITOR) 80 MG tablet 1 po daily    clopidogrel (PLAVIX) 75 mg tablet 1 po daily    hydroCHLOROthiazide (HYDRODIURIL) 25 MG tablet TAKE 1 TABLET(25 MG) BY MOUTH EVERY DAY    lisinopril (PRINIVIL,ZESTRIL) 40 MG tablet 1 po daily    nitroGLYCERIN (NITROSTAT) 0.4 MG SL tablet Place 1 tablet (0.4 mg total) under the tongue every 5 (five) minutes as needed for Chest pain (no more tiana 3 doses).     Family History     Problem Relation (Age of Onset)    Anuerysm Sister    Hypertension Mother, Father    No Known Problems Maternal Grandmother, Maternal Grandfather, Paternal Grandmother, Paternal Grandfather, Brother, Sister, Maternal Aunt, Maternal Uncle, Paternal Aunt, Paternal Uncle        Tobacco Use    Smoking status: Former Smoker     Packs/day: 0.25     Years: 20.00     Pack years: 5.00     Last attempt to quit: 1995     Years since quittin.3   Substance and Sexual Activity    Alcohol use: No     Alcohol/week: 0.0 standard drinks     Comment: h/o regular ETOH use, now drinks only on holidays    Drug use: No    Sexual activity: Not Currently     Review of Systems    Constitutional: Negative for chills and fever.   HENT: Negative for congestion and rhinorrhea.    Eyes: Negative for photophobia and visual disturbance.   Respiratory: Positive for shortness of breath (per family). Negative for cough and chest tightness.    Cardiovascular: Positive for leg swelling. Negative for chest pain and palpitations.   Gastrointestinal: Negative for abdominal pain, diarrhea, nausea and vomiting.   Genitourinary: Negative for difficulty urinating and dysuria.   Musculoskeletal: Negative for back pain and gait problem.   Skin: Negative for rash and wound.   Neurological: Negative for dizziness and headaches.   Psychiatric/Behavioral: Negative for agitation and confusion.     Objective:     Vital Signs (Most Recent):  Temp: 97.8 °F (36.6 °C) (03/06/20 0142)  Pulse: 76 (03/06/20 0250)  Resp: 20 (03/06/20 0250)  BP: (!) 130/57 (03/06/20 0250)  SpO2: 100 % (03/06/20 0250) Vital Signs (24h Range):  Temp:  [97.6 °F (36.4 °C)-97.8 °F (36.6 °C)] 97.8 °F (36.6 °C)  Pulse:  [] 76  Resp:  [14-20] 20  SpO2:  [76 %-100 %] 100 %  BP: (115-144)/(57-73) 130/57     Weight: 75 kg (165 lb 5.5 oz)  Body mass index is 30.24 kg/m².    Physical Exam   Constitutional: She appears well-developed and well-nourished.   HENT:   Head: Normocephalic and atraumatic.   Eyes: Pupils are equal, round, and reactive to light. EOM are normal.   Neck: Normal range of motion. Neck supple. JVD present.   Cardiovascular: Normal rate, regular rhythm, normal heart sounds and intact distal pulses.   Pulmonary/Chest: Effort normal and breath sounds normal.   Abdominal: Soft. Bowel sounds are normal.   Musculoskeletal: Normal range of motion. She exhibits edema (3+ pitting edema up to lower thighs).   Neurological: She is alert.   Oriented to person, placen situation, but not time/president   Skin: Skin is warm and dry.   Psychiatric: She has a normal mood and affect. Her speech is normal. Judgment and thought content normal. She  is slowed. Cognition and memory are impaired (mild).   Nursing note and vitals reviewed.        CRANIAL NERVES     CN III, IV, VI   Pupils are equal, round, and reactive to light.  Extraocular motions are normal.        Significant Labs:   CBC:   Recent Labs   Lab 03/05/20  2133   WBC 6.90   HGB 7.7*   HCT 27.2*        CMP:   Recent Labs   Lab 03/05/20  2133      K 4.2   *   CO2 24   *   BUN 25*   CREATININE 1.0   CALCIUM 9.2   PROT 6.1   ALBUMIN 2.8*   BILITOT 0.7   ALKPHOS 107   AST 22   ALT 21   ANIONGAP 7*   EGFRNONAA 52.2*       Significant Imaging: I have reviewed all pertinent imaging results/findings within the past 24 hours.

## 2020-03-06 NOTE — PROVIDER PROGRESS NOTES - EMERGENCY DEPT.
Encounter Date: 3/5/2020    ED Physician Progress Notes         EKG - STEMI Decision  Initial Reading: No STEMI present.    old LBBB  ____________________  Jonnathan Hernandez MD, FAA  Emergency Medicine Staff  8:53 PM 3/5/2020

## 2020-03-06 NOTE — ED PROVIDER NOTES
"CC: Leg Swelling (Pt reports BLE swelling that started 2 days ago. Pt ambulates with cane at baseline. Pts family states that pt "has been breathing hard after talking for a second or after she walks.")      History provided by:   Patient   grandson    HPI: Ciara Lozano is a 84 y.o. year old female past medical history of CAD, CHF, hyperlipidemia, hypertension, syncope a Ki who presents to the ED complaining of LE edema since Tue  Sob " all day long" "now and then", no cough,   Chest pain, midsternal, " not too long ago", not present currently    Echo from 2014 with LVEF 50-55 a ventricular diastolic dysfunction    On asa, not aware of any of her ather medication, " I don't know" when asked her medical problems    Per grandson, sob and LE edema x 2 days  patient denies any pain, no Uri symptoms, no cough, no fever, no weight gain      Past Medical History:   Diagnosis Date    CAD (coronary artery disease) 8/12/2014    Carotid artery disease     Congestive heart failure, unspecified     Encounter for blood transfusion     Hemangioma 4/2/2013    Hyperlipidemia     Hypertension     Peripheral vascular disease     Syncope and collapse      Past Surgical History:   Procedure Laterality Date    CARDIAC CATHETERIZATION      cataracts      CORONARY ANGIOPLASTY       Family History   Problem Relation Age of Onset    Hypertension Mother     Hypertension Father     Anuerysm Sister     No Known Problems Maternal Grandmother     No Known Problems Maternal Grandfather     No Known Problems Paternal Grandmother     No Known Problems Paternal Grandfather     No Known Problems Brother     No Known Problems Sister     No Known Problems Maternal Aunt     No Known Problems Maternal Uncle     No Known Problems Paternal Aunt     No Known Problems Paternal Uncle     Anemia Neg Hx     Arrhythmia Neg Hx     Asthma Neg Hx     Clotting disorder Neg Hx     Fainting Neg Hx     Heart attack Neg Hx     Heart " disease Neg Hx     Heart failure Neg Hx     Hyperlipidemia Neg Hx      No current facility-administered medications on file prior to encounter.      Current Outpatient Medications on File Prior to Encounter   Medication Sig Dispense Refill    amLODIPine (NORVASC) 10 MG tablet TAKE 1 TABLET(10 MG) BY MOUTH EVERY DAY 30 tablet 5    aspirin (ECOTRIN) 81 MG EC tablet Take 81 mg by mouth once daily.      atorvastatin (LIPITOR) 80 MG tablet 1 po daily 30 tablet 5    clopidogrel (PLAVIX) 75 mg tablet 1 po daily 30 tablet 5    hydroCHLOROthiazide (HYDRODIURIL) 25 MG tablet TAKE 1 TABLET(25 MG) BY MOUTH EVERY DAY 30 tablet 5    lisinopril (PRINIVIL,ZESTRIL) 40 MG tablet 1 po daily 30 tablet 5    nitroGLYCERIN (NITROSTAT) 0.4 MG SL tablet Place 1 tablet (0.4 mg total) under the tongue every 5 (five) minutes as needed for Chest pain (no more tiana 3 doses). 60 tablet 11     Pollen extracts  Social History     Socioeconomic History    Marital status:      Spouse name: Not on file    Number of children: Not on file    Years of education: Not on file    Highest education level: Not on file   Occupational History    Not on file   Social Needs    Financial resource strain: Not on file    Food insecurity:     Worry: Not on file     Inability: Not on file    Transportation needs:     Medical: Not on file     Non-medical: Not on file   Tobacco Use    Smoking status: Former Smoker     Packs/day: 0.25     Years: 20.00     Pack years: 5.00     Last attempt to quit: 1995     Years since quittin.3   Substance and Sexual Activity    Alcohol use: No     Alcohol/week: 0.0 standard drinks     Comment: h/o regular ETOH use, now drinks only on holidays    Drug use: No    Sexual activity: Not Currently   Lifestyle    Physical activity:     Days per week: Not on file     Minutes per session: Not on file    Stress: Not on file   Relationships    Social connections:     Talks on phone: Not on file     Gets  together: Not on file     Attends [employer] service: Not on file     Active member of club or organization: Not on file     Attends meetings of clubs or organizations: Not on file     Relationship status: Not on file   Other Topics Concern    Not on file   Social History Narrative    Not on file       ROS:     Constitutional : neg for fever, neg for weakness  HENT neg for head injury, neg for sore throat  Eyes: neg for visual changes, neg for eye pain  Resp pos for SOB, neg for cough  Cardiac  pos for chest pain, neg for palpitations  GI neg for abd pain, neg for nausea, neg for vomiting   neg for urinary changes  Neuro neg for focal weakness or numbness  Skin neg for skin rash  MSK: neg for joint pain, neg for joint swelling, + LE swelling  ALL: Pollen extracts    PHYSICAL EXAM:  Vitals:    03/06/20 0142   BP: (!) 144/70   Pulse: 99   Resp: 14   Temp: 97.8 °F (36.6 °C)         PHYSICAL EXAM:     general: comfortable, in no acute distress, pleasant, well nourished  VS: triage VS reviewed  HENT: NC/AT  Eyes: PERRL, EOMI  CV: RRR, no  murmurs, no rubs, no gallops, +2 LE edema, + JVD  Resp:  Dyspneic, bilateral crackles  ABD:  soft, ND, + normal BS, NT  Renal: No CVAT  Neuro: AAO x 3, 5/5 muscle strength in upper and lower extremities, sensation grossly intact, face symmetric, speech normal  MSK: no deformity, no edema            DATA & INTERVENTIONS:    LABS reviewed:  Labs Reviewed   COMPREHENSIVE METABOLIC PANEL - Abnormal; Notable for the following components:       Result Value    Chloride 113 (*)     Glucose 137 (*)     BUN, Bld 25 (*)     Albumin 2.8 (*)     Anion Gap 7 (*)     eGFR if non  52.2 (*)     All other components within normal limits   CBC W/ AUTO DIFFERENTIAL - Abnormal; Notable for the following components:    RBC 3.15 (*)     Hemoglobin 7.7 (*)     Hematocrit 27.2 (*)     Mean Corpuscular Hemoglobin 24.4 (*)     Mean Corpuscular Hemoglobin Conc 28.3 (*)     RDW 15.5 (*)      "Lymph # 0.9 (*)     Gran% 78.8 (*)     Lymph% 12.8 (*)     All other components within normal limits   TROPONIN I - Abnormal; Notable for the following components:    Troponin I 0.068 (*)     All other components within normal limits   B-TYPE NATRIURETIC PEPTIDE - Abnormal; Notable for the following components:    BNP 2,664 (*)     All other components within normal limits   URINALYSIS, REFLEX TO URINE CULTURE - Abnormal; Notable for the following components:    Protein, UA 1+ (*)     All other components within normal limits    Narrative:     Preferred Collection Type->Urine, Clean Catch   URINALYSIS MICROSCOPIC - Abnormal; Notable for the following components:    Hyaline Casts, UA 8 (*)     All other components within normal limits    Narrative:     Preferred Collection Type->Urine, Clean Catch   TROPONIN I   BASIC METABOLIC PANEL   MAGNESIUM   PHOSPHORUS   CBC W/ AUTO DIFFERENTIAL   FOLATE   IRON AND TIBC   FERRITIN   TRANSFERRIN   VITAMIN B12   TSH   LIPID PANEL       RADIOLOGY reviewed:  Imaging Results          X-Ray Chest PA And Lateral (Final result)  Result time 03/05/20 22:25:46    Final result by Roberto Carlos Horton MD (03/05/20 22:25:46)                 Impression:      Cardiomegaly with findings suggestive of CHF exacerbation and/or volume overload.  Small to moderate bilateral pleural effusions.      Electronically signed by: Roberto Carlos Horton MD  Date:    03/05/2020  Time:    22:25             Narrative:    EXAMINATION:  XR CHEST PA AND LATERAL    CLINICAL HISTORY:  Provided history is "  Shortness of breath".    TECHNIQUE:  Frontal and lateral views of the chest were performed.    COMPARISON:  08/28/2014.    FINDINGS:  Cardiac wires overlie the chest.  Examination is limited by poor positioning.  Cardiomediastinal silhouette is enlarged but similar to the prior study.  Atherosclerotic calcifications overlie the aortic arch.  There is central vascular congestion, bilateral perihilar airspace disease, and " small to moderate bilateral pleural effusions with adjacent passive atelectasis versus airspace disease.  No distinct pneumothorax.                                MEDICATIONS/FLUIDS:  Medications   sodium chloride 0.9% flush 10 mL (has no administration in time range)   amLODIPine tablet 10 mg (has no administration in time range)   aspirin EC tablet 81 mg (has no administration in time range)   atorvastatin tablet 80 mg (has no administration in time range)   clopidogreL tablet 75 mg (has no administration in time range)   lisinopril tablet 40 mg (has no administration in time range)   sodium chloride 0.9% flush 5 mL (has no administration in time range)   acetaminophen tablet 650 mg (has no administration in time range)   ketorolac injection 15 mg (has no administration in time range)   polyethylene glycol packet 17 g (has no administration in time range)   senna-docusate 8.6-50 mg per tablet 1 tablet (has no administration in time range)   ondansetron disintegrating tablet 4 mg (has no administration in time range)   promethazine (PHENERGAN) 6.25 mg in dextrose 5 % 50 mL IVPB (has no administration in time range)   glucose chewable tablet 16 g (has no administration in time range)   glucose chewable tablet 24 g (has no administration in time range)   glucagon (human recombinant) injection 1 mg (has no administration in time range)   albuterol-ipratropium 2.5 mg-0.5 mg/3 mL nebulizer solution 3 mL (has no administration in time range)   melatonin tablet 6 mg (has no administration in time range)   dextrose 10% (D10W) Bolus (has no administration in time range)   dextrose 10% (D10W) Bolus (has no administration in time range)   furosemide injection 40 mg (has no administration in time range)   furosemide injection 80 mg (80 mg Intravenous Given 3/6/20 0126)         MDM:  Ciara Lozano is a 84 y.o. year old female who presents to the ED complaining of bilateral lower extremity edema, short of breath.  Hypoxic on  arrival    Clinical picture suggestive of CHF.  Chart review with history of CHF diastolic dysfunction.  Patient reports also recent chest pain however unable to describe it or to say when exactly she had a chest pain.      DDX includes but not limited to:  Acute CHF exacerbation versus ACS versus renal failure versus hypoalbuminemia.  Unlikely PE    Labs ordered and reviewed:   UA negative for nitrites and leukocytes  Creatinine within normal limits  Hemoglobin 7.7 dropped from 11.2 May 2019  Troponin elevated 0.068 possible demand ischemia  BNP 2 664     Medication given in the ED:  Lasix 80 mg IV    CXR (ordered and reviewed):  Cardiomegaly and pulmonary edema  Imagings independently visualized: yes    EKG (independantly reviewed): , lbbb also present on previous EKG    Old records obtained and reviewed: yes    Case discussed with the consultant:  Dr. Frankel [facility]    IMPRESSION:  1.)  Acute CHF exacerbation with hypoxia      Dispo:  Admitted to [facility]    Critical Care Time: N/A       Tyesha Ambrose MD  03/06/20 [id number]

## 2020-03-07 PROBLEM — I50.43 ACUTE ON CHRONIC COMBINED SYSTOLIC AND DIASTOLIC HEART FAILURE: Status: ACTIVE | Noted: 2020-03-06

## 2020-03-07 LAB
ANION GAP SERPL CALC-SCNC: 6 MMOL/L (ref 8–16)
BASOPHILS # BLD AUTO: 0.03 K/UL (ref 0–0.2)
BASOPHILS NFR BLD: 0.6 % (ref 0–1.9)
BUN SERPL-MCNC: 25 MG/DL (ref 8–23)
CALCIUM SERPL-MCNC: 8.4 MG/DL (ref 8.7–10.5)
CHLORIDE SERPL-SCNC: 107 MMOL/L (ref 95–110)
CO2 SERPL-SCNC: 28 MMOL/L (ref 23–29)
CREAT SERPL-MCNC: 1 MG/DL (ref 0.5–1.4)
DIFFERENTIAL METHOD: ABNORMAL
EOSINOPHIL # BLD AUTO: 0.1 K/UL (ref 0–0.5)
EOSINOPHIL NFR BLD: 1.9 % (ref 0–8)
ERYTHROCYTE [DISTWIDTH] IN BLOOD BY AUTOMATED COUNT: 15.3 % (ref 11.5–14.5)
EST. GFR  (AFRICAN AMERICAN): >60 ML/MIN/1.73 M^2
EST. GFR  (NON AFRICAN AMERICAN): 52.2 ML/MIN/1.73 M^2
GLUCOSE SERPL-MCNC: 88 MG/DL (ref 70–110)
HCT VFR BLD AUTO: 25 % (ref 37–48.5)
HGB BLD-MCNC: 7 G/DL (ref 12–16)
IMM GRANULOCYTES # BLD AUTO: 0.02 K/UL (ref 0–0.04)
IMM GRANULOCYTES NFR BLD AUTO: 0.4 % (ref 0–0.5)
LYMPHOCYTES # BLD AUTO: 1.3 K/UL (ref 1–4.8)
LYMPHOCYTES NFR BLD: 25.1 % (ref 18–48)
MCH RBC QN AUTO: 24 PG (ref 27–31)
MCHC RBC AUTO-ENTMCNC: 28 G/DL (ref 32–36)
MCV RBC AUTO: 86 FL (ref 82–98)
MONOCYTES # BLD AUTO: 0.5 K/UL (ref 0.3–1)
MONOCYTES NFR BLD: 10.3 % (ref 4–15)
NEUTROPHILS # BLD AUTO: 3.2 K/UL (ref 1.8–7.7)
NEUTROPHILS NFR BLD: 61.7 % (ref 38–73)
NRBC BLD-RTO: 0 /100 WBC
PLATELET # BLD AUTO: 227 K/UL (ref 150–350)
PMV BLD AUTO: 11.6 FL (ref 9.2–12.9)
POTASSIUM SERPL-SCNC: 4.5 MMOL/L (ref 3.5–5.1)
RBC # BLD AUTO: 2.92 M/UL (ref 4–5.4)
SODIUM SERPL-SCNC: 141 MMOL/L (ref 136–145)
WBC # BLD AUTO: 5.14 K/UL (ref 3.9–12.7)

## 2020-03-07 PROCEDURE — 80048 BASIC METABOLIC PNL TOTAL CA: CPT

## 2020-03-07 PROCEDURE — 85025 COMPLETE CBC W/AUTO DIFF WBC: CPT

## 2020-03-07 PROCEDURE — 25000003 PHARM REV CODE 250: Performed by: PHYSICIAN ASSISTANT

## 2020-03-07 PROCEDURE — 63600175 PHARM REV CODE 636 W HCPCS: Performed by: INTERNAL MEDICINE

## 2020-03-07 PROCEDURE — 99232 PR SUBSEQUENT HOSPITAL CARE,LEVL II: ICD-10-PCS | Mod: ,,, | Performed by: INTERNAL MEDICINE

## 2020-03-07 PROCEDURE — 20600001 HC STEP DOWN PRIVATE ROOM

## 2020-03-07 PROCEDURE — 36415 COLL VENOUS BLD VENIPUNCTURE: CPT

## 2020-03-07 PROCEDURE — 99232 SBSQ HOSP IP/OBS MODERATE 35: CPT | Mod: ,,, | Performed by: INTERNAL MEDICINE

## 2020-03-07 PROCEDURE — 25000003 PHARM REV CODE 250: Performed by: INTERNAL MEDICINE

## 2020-03-07 PROCEDURE — 63600175 PHARM REV CODE 636 W HCPCS: Performed by: PHYSICIAN ASSISTANT

## 2020-03-07 RX ORDER — FUROSEMIDE 10 MG/ML
40 INJECTION INTRAMUSCULAR; INTRAVENOUS 2 TIMES DAILY
Status: DISCONTINUED | OUTPATIENT
Start: 2020-03-07 | End: 2020-03-10

## 2020-03-07 RX ORDER — FAMOTIDINE 20 MG/1
20 TABLET, FILM COATED ORAL DAILY
Status: DISCONTINUED | OUTPATIENT
Start: 2020-03-08 | End: 2020-03-11 | Stop reason: HOSPADM

## 2020-03-07 RX ADMIN — ATORVASTATIN CALCIUM 80 MG: 20 TABLET, FILM COATED ORAL at 09:03

## 2020-03-07 RX ADMIN — ASPIRIN 81 MG: 81 TABLET, COATED ORAL at 09:03

## 2020-03-07 RX ADMIN — AMLODIPINE BESYLATE 10 MG: 10 TABLET ORAL at 09:03

## 2020-03-07 RX ADMIN — FERROUS SULFATE TAB EC 325 MG (65 MG FE EQUIVALENT) 325 MG: 325 (65 FE) TABLET DELAYED RESPONSE at 09:03

## 2020-03-07 RX ADMIN — POLYETHYLENE GLYCOL 3350 17 G: 17 POWDER, FOR SOLUTION ORAL at 09:03

## 2020-03-07 RX ADMIN — CLOPIDOGREL BISULFATE 75 MG: 75 TABLET ORAL at 09:03

## 2020-03-07 RX ADMIN — FUROSEMIDE 40 MG: 10 INJECTION, SOLUTION INTRAMUSCULAR; INTRAVENOUS at 06:03

## 2020-03-07 RX ADMIN — FUROSEMIDE 40 MG: 10 INJECTION, SOLUTION INTRAMUSCULAR; INTRAVENOUS at 09:03

## 2020-03-07 NOTE — PLAN OF CARE
Patient  resting quietly, no s/s of acute distress. Telemetry showing SR-ST on monitor. Bedside commode within reach. O2 at 1 liter. Bed alarm activated. Call light within reach.

## 2020-03-07 NOTE — PROGRESS NOTES
"  [facility] Hospital Medicine  Progress Note    Patient Name: Ciara Lozano  MRN: [id number]  Patient Class: IP- Inpatient   Admission Date: 3/5/2020  Length of Stay: 1 days  Attending Physician: Mechelle Gayle MD  Primary Care Provider: Andreina Gonzales MD    [facility] Medicine Team: [facility] HOSP MED D Mechelle Gayle MD    Subjective:     Principal Problem:Acute on chronic diastolic heart failure    Interval History:   Chief Complaint   Patient presents with    Leg Swelling     Pt reports BLE swelling that started 2 days ago. Pt ambulates with cane at baseline. Pts family states that pt "has been breathing hard after talking for a second or after she walks."     Follow up visit for Acute on chronic diastolic heart failure    History / Events Overnight: No significant events reported by Nursing. Patient with no new complaints.    Data reviewed 3/7/2020:  H&H stable. Troponin flat    Review of Systems   Constitutional: Negative for fever.   Respiratory: Negative for shortness of breath.      Objective:     Vital Signs (Most Recent):  Temp: 98.3 °F (36.8 °C) (03/07/20 1138)  Pulse: 69 (03/07/20 1138)  Resp: 18 (03/07/20 1138)  BP: (!) 144/63 (03/07/20 1138)  SpO2: 100 % (03/07/20 1138) Vital Signs (24h Range):  Temp:  [97.5 °F (36.4 °C)-98.3 °F (36.8 °C)] 98.3 °F (36.8 °C)  Pulse:  [63-87] 69  Resp:  [17-20] 18  SpO2:  [95 %-100 %] 100 %  BP: ()/(53-63) 144/63     Weight: 74.8 kg (164 lb 14.5 oz)  Body mass index is 30.16 kg/m².    Intake/Output Summary (Last 24 hours) at 3/7/2020 1411  Last data filed at 3/7/2020 1140  Gross per 24 hour   Intake --   Output 1700 ml   Net -1700 ml      Physical Exam   Constitutional: She is cooperative. No distress.   Eyes: Conjunctivae and lids are normal. No scleral icterus.   Cardiovascular: Normal rate, regular rhythm, S1 normal and S2 normal.   Pulmonary/Chest: Effort normal and breath sounds normal. She has no wheezes. She has no rhonchi.   Abdominal: " Soft. Normal appearance and bowel sounds are normal. There is no tenderness.   Musculoskeletal: She exhibits no edema.   Neurological: She is alert. She is not disoriented.   Skin: Skin is warm and dry. No cyanosis. No pallor.   Psychiatric: She has a normal mood and affect.     Significant Labs:     CBC:   Recent Labs   Lab 03/05/20 2133 03/06/20 0356 03/07/20  0456   WBC 6.90 5.93 5.14   HGB 7.7* 7.6* 7.0*   HCT 27.2* 26.1* 25.0*    245 227     CMP:   Recent Labs   Lab 03/05/20 2133 03/06/20 0356 03/07/20  0456    143 141   K 4.2 4.1 4.5   * 110 107   CO2 24 26 28   * 96 88   BUN 25* 23 25*   CREATININE 1.0 0.9 1.0   CALCIUM 9.2 9.2 8.4*   PROT 6.1  --   --    ALBUMIN 2.8*  --   --    BILITOT 0.7  --   --    ALKPHOS 107  --   --    AST 22  --   --    ALT 21  --   --    ANIONGAP 7* 7* 6*   EGFRNONAA 52.2* 59.3* 52.2*     Cardiac Markers:   Recent Labs   Lab 03/05/20 2133   BNP 2,664*     Lipid Panel:   Recent Labs   Lab 03/06/20  0356   CHOL 94*   HDL 35*   LDLCALC 48.2*   TRIG 54   CHOLHDL 37.2     Magnesium:   Recent Labs   Lab 03/06/20  0356   MG 2.2     Troponin:   Recent Labs   Lab 03/05/20 2133 03/06/20 0356 03/06/20  1230   TROPONINI 0.068* 0.089* 0.079*     TSH:   Recent Labs   Lab 03/06/20  0356   TSH 0.337*     Urine Studies:   Recent Labs   Lab 03/05/20 2257   COLORU Yellow   APPEARANCEUA Clear   PHUR 5.0   SPECGRAV 1.020   PROTEINUA 1+*   GLUCUA Negative   KETONESU Negative   BILIRUBINUA Negative   OCCULTUA Negative   NITRITE Negative   LEUKOCYTESUR Negative   RBCUA 2   WBCUA 1   BACTERIA None   SQUAMEPITHEL 1   HYALINECASTS 8*       Significant Imaging:   ECHO 3/6  · Mild left ventricular enlargement.  · Eccentric left ventricular hypertrophy.  · Moderately decreased left ventricular systolic function. The estimated ejection fraction is 30%.  · Local segmental wall motion abnormalities.  · Grade II (moderate) left ventricular diastolic dysfunction consistent with  pseudonormalization.  · Severe left atrial enlargement.  · Low normal right ventricular systolic function.  · Mild-to-moderate aortic regurgitation.  · Moderate mitral sclerosis.  · Moderate-to-severe mitral regurgitation.  · Mild tricuspid regurgitation.  · Normal central venous pressure (3 mmHg).  · The estimated PA systolic pressure is 38 mmHg.  · Small posterolateral pericardial effusion.    Assessment/Plan:      Active Diagnoses:    Diagnosis Date Noted POA    PRINCIPAL PROBLEM:  Acute on chronic combined systolic and diastolic heart failure [I50.43] 03/06/2020 Yes    Chronic renal failure, stage 3 (moderate) [N18.3] 03/06/2020 Yes    Anemia [D64.9] 03/06/2020 Yes    Memory deficits [R41.3] 03/06/2020 Yes    Elevated troponin I level [R79.89] 03/06/2020 Yes    Carotid artery disease [I73.9]  Yes    CAD (coronary artery disease) [I25.10] 08/12/2014 Yes    LBBB (left bundle branch block) [I44.7] 07/16/2014 Yes    Hypertension [I10] 11/19/2012 Yes     Chronic    Hyperlipidemia LDL goal < 100 [E78.5] 11/19/2012 Yes     Chronic      Problems Resolved During this Admission:       Overview / ICU Course:    Ciara Lozano is a 84 y.o. female with medical history significant for CAD, HFpEF, HLD, HTN admitted for Acute on chronic diastolic heart failure.    Inpatient Medications Prescribed for Management of Current Problems:     Scheduled Meds:    amLODIPine  10 mg Oral Daily    aspirin  81 mg Oral Daily    atorvastatin  80 mg Oral Daily    clopidogreL  75 mg Oral Daily    ferrous sulfate  325 mg Oral Daily    furosemide (LASIX) IV  40 mg Intravenous BID    polyethylene glycol  17 g Oral Daily     Continuous Infusions:   As Needed: acetaminophen, albuterol-ipratropium, Dextrose 10% Bolus, Dextrose 10% Bolus, glucagon (human recombinant), glucose, glucose, ketorolac, melatonin, ondansetron, promethazine (PHENERGAN) IVPB, senna-docusate 8.6-50 mg, sodium chloride 0.9%    Assessment and Plan by  Problem    Acute on chronic systolic and diastolic heart failure  Hypervolemic on exam and imaging and BNP significantly elevated to 2600 on admission  - Last echo 2014 with low-normal EF and diastolic dysfunction  - on HCTZ as OP  - unclear if patient has been compliant with her home meds and even states that the granddaughter she lives with is in the hospital.  - patient is Lasix naive and was given 80 mg IVP of Lasix in ED.    - Started Lasix 40 mg IVP BID; assess response symptomatically and renal function.    - monitor response with daily weights, strict I/Os, oxygen requirements  - Na restricted/fluid restricted diet 1500 ml  - TTE with EF 30% now; plan to Consult Cardiology    Anemia  Hg 7.7, down from baseline of 9 -11 last year  - check iron studies, TSH, B12, folate     CAD (coronary artery disease)  Carotid artery disease  Hyperlipidemia LDL goal < 100  - continue with DAPT, statin  - last lipid studies 5/2019 benign     Hypertension  Controlled  - hold HCTZ for now  - hold lisinopril 40 mg PO daily as she is normotensive and unclear if compliant with home meds  - continue amlodipine 10 mg PO daily     Memory deficits  Likely has unspecified vs vascular dementia  - PT/OT consulted for debility  - delirium precautions in place, fall precautions     Chronic renal failure, stage 3 (moderate)  Chronic and stable     LBBB (left bundle branch block)  Chronic and stable     Elevated troponin I level  Likely type II NSTEMI from demand/CHF exacerbation.  Concern for worsening EF; consulting Cardiology    Discharge plan and follow up  Home-Health Care [facility]  ADRYAN 3/9/2020  Previous admission:  3/31/15  Goals of Care:  General Prognosis: good  Goals: Curative  Comfort Only: No  Hospice: No  Code Status: Full Code    Diet: Diet [facility]; Fluid - 1500mL  GI Prophylaxis: Indicated due to multiple minor risk factors  Significant LDAs:   IV Access Type: Peripheral  Urinary Catheter Indication if present:  Patient Does Not Have Urinary Catheter  Other Lines/Tubes/Drains:    VTE Risk Mitigation (From admission, onward)         Ordered     IP VTE HIGH RISK PATIENT  Once      03/06/20 0211     Place ZORAIDA hose  Until discontinued      03/06/20 0211     Place sequential compression device  Until discontinued      03/06/20 0211                 Mechelle Gayle MD  Department of Hospital Medicine   [facility]

## 2020-03-07 NOTE — PHARMACY MED REC
"Admission Medication Reconciliation - Pharmacy Consult Note    The home medication history was taken by Cheyenne Hou, Pharmacy Technician. Based on information gathered and subsequent review by the clinical pharmacist, the items below may need attention.    You may go to "Admission" then "Reconcile Home Medications" tabs to review and/or act upon these items.    No issues noted with the medication reconciliation.    Potential issues to be addressed PRIOR TO DISCHARGE  o Medication non-adherence; meds last filled 9/25/2019 for 30-day supply    Please address this information as you see fit.  Feel free to contact us if you have any questions or require assistance.    Chrystal Thomas, PharmD, BCPS  [id number]     PTA Medications   Medication Sig    amLODIPine (NORVASC) 10 MG tablet TAKE 1 TABLET(10 MG) BY MOUTH EVERY DAY    aspirin (ECOTRIN) 81 MG EC tablet Take 81 mg by mouth once daily.    atorvastatin (LIPITOR) 80 MG tablet 1 po daily    clopidogrel (PLAVIX) 75 mg tablet 1 po daily    hydroCHLOROthiazide (HYDRODIURIL) 25 MG tablet TAKE 1 TABLET(25 MG) BY MOUTH EVERY DAY    lisinopril (PRINIVIL,ZESTRIL) 40 MG tablet 1 po daily    nitroGLYCERIN (NITROSTAT) 0.4 MG SL tablet Place 1 tablet (0.4 mg total) under the tongue every 5 (five) minutes as needed for Chest pain (no more tiana 3 doses).     .    .            "

## 2020-03-07 NOTE — CONSULTS
"PharmD Consult received for:    1.) Admit medication history/reconciliation:  · Completed; please see- "Admission Medication Reconciliation - Pharmacy Consult Note"    Pharmacy will sign-off, please re-consult as needed.      Thank you for the consult,  Chrystal Thoams, PharmD, BCPS  [id number]     **Note: Consults are reviewed Monday-Friday 7:00am-3:30pm. The above recommendations are only suggested. The recommendations should be considered in conjunction with all patient factors.**      "

## 2020-03-08 LAB
ABO + RH BLD: NORMAL
ALBUMIN SERPL BCP-MCNC: 2.3 G/DL (ref 3.5–5.2)
ANION GAP SERPL CALC-SCNC: 6 MMOL/L (ref 8–16)
BASOPHILS # BLD AUTO: 0.01 K/UL (ref 0–0.2)
BASOPHILS NFR BLD: 0.2 % (ref 0–1.9)
BLD GP AB SCN CELLS X3 SERPL QL: NORMAL
BUN SERPL-MCNC: 26 MG/DL (ref 8–23)
CALCIUM SERPL-MCNC: 8 MG/DL (ref 8.7–10.5)
CHLORIDE SERPL-SCNC: 105 MMOL/L (ref 95–110)
CO2 SERPL-SCNC: 29 MMOL/L (ref 23–29)
CREAT SERPL-MCNC: 1.2 MG/DL (ref 0.5–1.4)
DIFFERENTIAL METHOD: ABNORMAL
EOSINOPHIL # BLD AUTO: 0.1 K/UL (ref 0–0.5)
EOSINOPHIL NFR BLD: 1.5 % (ref 0–8)
ERYTHROCYTE [DISTWIDTH] IN BLOOD BY AUTOMATED COUNT: 15.3 % (ref 11.5–14.5)
EST. GFR  (AFRICAN AMERICAN): 48.3 ML/MIN/1.73 M^2
EST. GFR  (NON AFRICAN AMERICAN): 41.9 ML/MIN/1.73 M^2
GLUCOSE SERPL-MCNC: 133 MG/DL (ref 70–110)
HCT VFR BLD AUTO: 23.5 % (ref 37–48.5)
HGB BLD-MCNC: 6.8 G/DL (ref 12–16)
HGB BLD-MCNC: 7.1 G/DL (ref 12–16)
IMM GRANULOCYTES # BLD AUTO: 0.02 K/UL (ref 0–0.04)
IMM GRANULOCYTES NFR BLD AUTO: 0.4 % (ref 0–0.5)
LYMPHOCYTES # BLD AUTO: 1.1 K/UL (ref 1–4.8)
LYMPHOCYTES NFR BLD: 22.6 % (ref 18–48)
MAGNESIUM SERPL-MCNC: 1.9 MG/DL (ref 1.6–2.6)
MCH RBC QN AUTO: 24.1 PG (ref 27–31)
MCHC RBC AUTO-ENTMCNC: 28.9 G/DL (ref 32–36)
MCV RBC AUTO: 83 FL (ref 82–98)
MONOCYTES # BLD AUTO: 0.4 K/UL (ref 0.3–1)
MONOCYTES NFR BLD: 8.8 % (ref 4–15)
NEUTROPHILS # BLD AUTO: 3.2 K/UL (ref 1.8–7.7)
NEUTROPHILS NFR BLD: 66.5 % (ref 38–73)
NRBC BLD-RTO: 0 /100 WBC
PHOSPHATE SERPL-MCNC: 3.4 MG/DL (ref 2.7–4.5)
PLATELET # BLD AUTO: 205 K/UL (ref 150–350)
PMV BLD AUTO: 11.3 FL (ref 9.2–12.9)
POTASSIUM SERPL-SCNC: 4.1 MMOL/L (ref 3.5–5.1)
RBC # BLD AUTO: 2.82 M/UL (ref 4–5.4)
SODIUM SERPL-SCNC: 140 MMOL/L (ref 136–145)
WBC # BLD AUTO: 4.78 K/UL (ref 3.9–12.7)

## 2020-03-08 PROCEDURE — 36415 COLL VENOUS BLD VENIPUNCTURE: CPT

## 2020-03-08 PROCEDURE — 25000003 PHARM REV CODE 250: Performed by: INTERNAL MEDICINE

## 2020-03-08 PROCEDURE — 85025 COMPLETE CBC W/AUTO DIFF WBC: CPT

## 2020-03-08 PROCEDURE — 20600001 HC STEP DOWN PRIVATE ROOM

## 2020-03-08 PROCEDURE — 94761 N-INVAS EAR/PLS OXIMETRY MLT: CPT

## 2020-03-08 PROCEDURE — 99232 SBSQ HOSP IP/OBS MODERATE 35: CPT | Mod: ,,, | Performed by: INTERNAL MEDICINE

## 2020-03-08 PROCEDURE — 99222 PR INITIAL HOSPITAL CARE,LEVL II: ICD-10-PCS | Mod: GC,,, | Performed by: INTERNAL MEDICINE

## 2020-03-08 PROCEDURE — 99222 1ST HOSP IP/OBS MODERATE 55: CPT | Mod: GC,,, | Performed by: INTERNAL MEDICINE

## 2020-03-08 PROCEDURE — 25000003 PHARM REV CODE 250: Performed by: PHYSICIAN ASSISTANT

## 2020-03-08 PROCEDURE — 63600175 PHARM REV CODE 636 W HCPCS: Performed by: INTERNAL MEDICINE

## 2020-03-08 PROCEDURE — 80069 RENAL FUNCTION PANEL: CPT

## 2020-03-08 PROCEDURE — 85018 HEMOGLOBIN: CPT

## 2020-03-08 PROCEDURE — 99900035 HC TECH TIME PER 15 MIN (STAT)

## 2020-03-08 PROCEDURE — 83735 ASSAY OF MAGNESIUM: CPT

## 2020-03-08 PROCEDURE — 99232 PR SUBSEQUENT HOSPITAL CARE,LEVL II: ICD-10-PCS | Mod: ,,, | Performed by: INTERNAL MEDICINE

## 2020-03-08 PROCEDURE — 86901 BLOOD TYPING SEROLOGIC RH(D): CPT

## 2020-03-08 PROCEDURE — 27000221 HC OXYGEN, UP TO 24 HOURS

## 2020-03-08 RX ORDER — METOPROLOL TARTRATE 25 MG/1
12.5 TABLET ORAL 2 TIMES DAILY
Status: DISCONTINUED | OUTPATIENT
Start: 2020-03-08 | End: 2020-03-09

## 2020-03-08 RX ORDER — AMLODIPINE BESYLATE 5 MG/1
5 TABLET ORAL DAILY
Status: DISCONTINUED | OUTPATIENT
Start: 2020-03-09 | End: 2020-03-09

## 2020-03-08 RX ORDER — LISINOPRIL 5 MG/1
5 TABLET ORAL DAILY
Status: DISCONTINUED | OUTPATIENT
Start: 2020-03-08 | End: 2020-03-10

## 2020-03-08 RX ADMIN — METOPROLOL TARTRATE 12.5 MG: 25 TABLET, FILM COATED ORAL at 12:03

## 2020-03-08 RX ADMIN — FUROSEMIDE 40 MG: 10 INJECTION, SOLUTION INTRAMUSCULAR; INTRAVENOUS at 09:03

## 2020-03-08 RX ADMIN — POLYETHYLENE GLYCOL 3350 17 G: 17 POWDER, FOR SOLUTION ORAL at 09:03

## 2020-03-08 RX ADMIN — FERROUS SULFATE TAB EC 325 MG (65 MG FE EQUIVALENT) 325 MG: 325 (65 FE) TABLET DELAYED RESPONSE at 09:03

## 2020-03-08 RX ADMIN — FAMOTIDINE 20 MG: 20 TABLET ORAL at 09:03

## 2020-03-08 RX ADMIN — LISINOPRIL 5 MG: 5 TABLET ORAL at 12:03

## 2020-03-08 RX ADMIN — ATORVASTATIN CALCIUM 80 MG: 20 TABLET, FILM COATED ORAL at 09:03

## 2020-03-08 RX ADMIN — AMLODIPINE BESYLATE 10 MG: 10 TABLET ORAL at 09:03

## 2020-03-08 RX ADMIN — METOPROLOL TARTRATE 12.5 MG: 25 TABLET, FILM COATED ORAL at 08:03

## 2020-03-08 RX ADMIN — CLOPIDOGREL BISULFATE 75 MG: 75 TABLET ORAL at 09:03

## 2020-03-08 RX ADMIN — FUROSEMIDE 40 MG: 10 INJECTION, SOLUTION INTRAMUSCULAR; INTRAVENOUS at 05:03

## 2020-03-08 RX ADMIN — ASPIRIN 81 MG: 81 TABLET, COATED ORAL at 09:03

## 2020-03-08 NOTE — CONSULTS
[facility]  Cardiology  Consult Note    Patient Name: Ciara Lozano  MRN: [id number]  Admission Date: 3/5/2020  Hospital Length of Stay: 2 days  Code Status: Full Code   Attending Provider: Mechelle Gayle MD   Consulting Provider: Davi Welch MD  Primary Care Physician: Andreina Gonzales MD  Principal Problem:Acute on chronic combined systolic and diastolic heart failure    Patient information was obtained from patient and ER records.     Inpatient consult to Cardiology  Consult performed by: Davi Welch MD  Consult ordered by: Mechelle Gayle MD        Subjective:     Chief Complaint:  Newly reduced EF     HPI:   Ms. Lozano is an 84y lady with HTN, bilateral carotid stenosis, and CAD s/p PCI with DELROY to pLAD across aneurysm, DELROY to dLCx , and DELROY to  OM4 on 9/2/14 who presented with ADHF. Cardiology consulted for newly reduced EF. Challenging to obtain accurate history, but seems to be reporting dyspnea over the past few days. Denies orthopnea (1 pillow at night which has been stable). Since admit, she was being diuresing with lasix IV (currenlty 40 IV BID) with improvement of symptoms. Net negative 4.7L since admit. Previosly EF 52%. Echo with EF 30% with mid-apical AS akinesis with global hypokinesis otherwise, G2DD, severe LAE, mod-severe MR, mild-mod AR, and PASP 38 with IVC 3 mmHg. CXR on admit with bilateral pleural effusion and perihilar congestion. ECG with ST, LBBB (old), and QTc prolongation. Trops flat at 0.07-0.09. BNP 2600 on admit. Takes baby aspirin at home. Not on Plavix.    Past Medical History:   Diagnosis Date    CAD (coronary artery disease) 8/12/2014    Carotid artery disease     Congestive heart failure, unspecified     Encounter for blood transfusion     Hemangioma 4/2/2013    Hyperlipidemia     Hypertension     Peripheral vascular disease     Syncope and collapse        Past Surgical History:   Procedure Laterality Date    CARDIAC CATHETERIZATION       cataracts      CORONARY ANGIOPLASTY         Review of patient's allergies indicates:   Allergen Reactions    Pollen extracts        No current facility-administered medications on file prior to encounter.      Current Outpatient Medications on File Prior to Encounter   Medication Sig    amLODIPine (NORVASC) 10 MG tablet TAKE 1 TABLET(10 MG) BY MOUTH EVERY DAY    aspirin (ECOTRIN) 81 MG EC tablet Take 81 mg by mouth once daily.    atorvastatin (LIPITOR) 80 MG tablet 1 po daily    clopidogrel (PLAVIX) 75 mg tablet 1 po daily    hydroCHLOROthiazide (HYDRODIURIL) 25 MG tablet TAKE 1 TABLET(25 MG) BY MOUTH EVERY DAY    lisinopril (PRINIVIL,ZESTRIL) 40 MG tablet 1 po daily    nitroGLYCERIN (NITROSTAT) 0.4 MG SL tablet Place 1 tablet (0.4 mg total) under the tongue every 5 (five) minutes as needed for Chest pain (no more tiana 3 doses).     Family History     Problem Relation (Age of Onset)    Anuerysm Sister    Hypertension Mother, Father    No Known Problems Maternal Grandmother, Maternal Grandfather, Paternal Grandmother, Paternal Grandfather, Brother, Sister, Maternal Aunt, Maternal Uncle, Paternal Aunt, Paternal Uncle        Tobacco Use    Smoking status: Former Smoker     Packs/day: 0.25     Years: 20.00     Pack years: 5.00     Last attempt to quit: 1995     Years since quittin.3   Substance and Sexual Activity    Alcohol use: No     Alcohol/week: 0.0 standard drinks     Comment: h/o regular ETOH use, now drinks only on holidays    Drug use: No    Sexual activity: Not Currently     Review of Systems   Constitution: Negative for chills and fever.   Cardiovascular: Positive for dyspnea on exertion. Negative for chest pain, claudication, irregular heartbeat, leg swelling, near-syncope, orthopnea, palpitations, paroxysmal nocturnal dyspnea and syncope.   Respiratory: Positive for shortness of breath. Negative for cough.    Musculoskeletal: Negative for joint pain and joint swelling.    Gastrointestinal: Negative for abdominal pain, nausea and vomiting.   Neurological: Negative for focal weakness and headaches.   All other systems reviewed and are negative.    Objective:     Vital Signs (Most Recent):  Temp: 98.6 °F (37 °C) (03/08/20 0737)  Pulse: 77 (03/08/20 0737)  Resp: 20 (03/08/20 0737)  BP: (!) 112/57 (03/08/20 0737)  SpO2: 98 % (03/08/20 0737) Vital Signs (24h Range):  Temp:  [97.4 °F (36.3 °C)-98.6 °F (37 °C)] 98.6 °F (37 °C)  Pulse:  [65-80] 77  Resp:  [18-20] 20  SpO2:  [96 %-100 %] 98 %  BP: (112-144)/(56-63) 112/57     Weight: 75 kg (165 lb 5.5 oz)  Body mass index is 30.24 kg/m².    SpO2: 98 %  O2 Device (Oxygen Therapy): nasal cannula      Intake/Output Summary (Last 24 hours) at 3/8/2020 0933  Last data filed at 3/8/2020 0600  Gross per 24 hour   Intake 280 ml   Output 2950 ml   Net -2670 ml       Lines/Drains/Airways     Peripheral Intravenous Line                 Peripheral IV - Single Lumen 03/05/20 2133 20 G Left Antecubital 2 days                Physical Exam   Constitutional: She appears well-developed and well-nourished. No distress.   HENT:   Head: Normocephalic and atraumatic.   Eyes: EOM are normal. Right eye exhibits no discharge. Left eye exhibits no discharge. No scleral icterus.   Neck: Normal range of motion. Neck supple. JVD present.   Cardiovascular: Normal rate, regular rhythm, S1 normal, S2 normal, intact distal pulses and normal pulses.  No extrasystoles are present. Exam reveals gallop. Exam reveals no S3, no S4, no distant heart sounds, no friction rub and no opening snap.   Murmur heard.  Pulmonary/Chest: Effort normal. No respiratory distress. She has no wheezes. She has rales.   Abdominal: Soft. Bowel sounds are normal. She exhibits no distension. There is no tenderness.   Musculoskeletal: Normal range of motion. She exhibits edema. She exhibits no deformity.   Neurological: She is alert.   Skin: Skin is warm and dry. No rash noted. She is not diaphoretic.  No erythema.   Nursing note and vitals reviewed.      Significant Labs: All pertinent lab results from the last 24 hours have been reviewed.    Significant Imaging: Reviewed    Assessment and Plan:     * Acute on chronic combined systolic and diastolic heart failure  EF 30% (previously 52%) with known extensive CAD s/p PCI with DELROY to pLAD across aneurysm, DELROY to dLCx , and DELROY to  OM4 on 9/2/14. Clinically patient is still volume overloaded (+JVD and HJR). Would not use peripheral edema as marker given albumin 2.3. Significant anemia of 6.8 (previously 11.2) and c/w LINDA (no known blood loss per patient). From cardiac standpoint, would continue to diuresis and obtain euvolemia. Patient can undergo stress test to evaluate for high risk features requiring LHC in future. Would avoid invasive procedures with significant new anemia. Will need to start GDMT and titrate as needed. No indication for plavix, and in a patient with significant new anemia would recommend d/c.    Recommendations:  - Start lopressor 25 mg PO BID (can transition to Toprol XL at discharge)  - Low dose ACE/ARB  - Continue lasix IV with net goal out 1.5L today  - Cardiac PET stress        VTE Risk Mitigation (From admission, onward)         Ordered     IP VTE HIGH RISK PATIENT  Once      03/06/20 0211     Place ZORAIDA hose  Until discontinued      03/06/20 0211     Place sequential compression device  Until discontinued      03/06/20 0211                Thank you for your consult. I will follow-up with patient. Please contact us if you have any additional questions.    Davi Welch MD  Cardiology   [facility]

## 2020-03-08 NOTE — SUBJECTIVE & OBJECTIVE
Past Medical History:   Diagnosis Date    CAD (coronary artery disease) 2014    Carotid artery disease     Congestive heart failure, unspecified     Encounter for blood transfusion     Hemangioma 2013    Hyperlipidemia     Hypertension     Peripheral vascular disease     Syncope and collapse        Past Surgical History:   Procedure Laterality Date    CARDIAC CATHETERIZATION      cataracts      CORONARY ANGIOPLASTY         Review of patient's allergies indicates:   Allergen Reactions    Pollen extracts        No current facility-administered medications on file prior to encounter.      Current Outpatient Medications on File Prior to Encounter   Medication Sig    amLODIPine (NORVASC) 10 MG tablet TAKE 1 TABLET(10 MG) BY MOUTH EVERY DAY    aspirin (ECOTRIN) 81 MG EC tablet Take 81 mg by mouth once daily.    atorvastatin (LIPITOR) 80 MG tablet 1 po daily    clopidogrel (PLAVIX) 75 mg tablet 1 po daily    hydroCHLOROthiazide (HYDRODIURIL) 25 MG tablet TAKE 1 TABLET(25 MG) BY MOUTH EVERY DAY    lisinopril (PRINIVIL,ZESTRIL) 40 MG tablet 1 po daily    nitroGLYCERIN (NITROSTAT) 0.4 MG SL tablet Place 1 tablet (0.4 mg total) under the tongue every 5 (five) minutes as needed for Chest pain (no more tiana 3 doses).     Family History     Problem Relation (Age of Onset)    Anuerysm Sister    Hypertension Mother, Father    No Known Problems Maternal Grandmother, Maternal Grandfather, Paternal Grandmother, Paternal Grandfather, Brother, Sister, Maternal Aunt, Maternal Uncle, Paternal Aunt, Paternal Uncle        Tobacco Use    Smoking status: Former Smoker     Packs/day: 0.25     Years: 20.00     Pack years: 5.00     Last attempt to quit: 1995     Years since quittin.3   Substance and Sexual Activity    Alcohol use: No     Alcohol/week: 0.0 standard drinks     Comment: h/o regular ETOH use, now drinks only on holidays    Drug use: No    Sexual activity: Not Currently     Review of Systems    Constitution: Negative for chills and fever.   Cardiovascular: Positive for dyspnea on exertion. Negative for chest pain, claudication, irregular heartbeat, leg swelling, near-syncope, orthopnea, palpitations, paroxysmal nocturnal dyspnea and syncope.   Respiratory: Positive for shortness of breath. Negative for cough.    Musculoskeletal: Negative for joint pain and joint swelling.   Gastrointestinal: Negative for abdominal pain, nausea and vomiting.   Neurological: Negative for focal weakness and headaches.   All other systems reviewed and are negative.    Objective:     Vital Signs (Most Recent):  Temp: 98.6 °F (37 °C) (03/08/20 0737)  Pulse: 77 (03/08/20 0737)  Resp: 20 (03/08/20 0737)  BP: (!) 112/57 (03/08/20 0737)  SpO2: 98 % (03/08/20 0737) Vital Signs (24h Range):  Temp:  [97.4 °F (36.3 °C)-98.6 °F (37 °C)] 98.6 °F (37 °C)  Pulse:  [65-80] 77  Resp:  [18-20] 20  SpO2:  [96 %-100 %] 98 %  BP: (112-144)/(56-63) 112/57     Weight: 75 kg (165 lb 5.5 oz)  Body mass index is 30.24 kg/m².    SpO2: 98 %  O2 Device (Oxygen Therapy): nasal cannula      Intake/Output Summary (Last 24 hours) at 3/8/2020 0933  Last data filed at 3/8/2020 0600  Gross per 24 hour   Intake 280 ml   Output 2950 ml   Net -2670 ml       Lines/Drains/Airways     Peripheral Intravenous Line                 Peripheral IV - Single Lumen 03/05/20 2133 20 G Left Antecubital 2 days                Physical Exam   Constitutional: She appears well-developed and well-nourished. No distress.   HENT:   Head: Normocephalic and atraumatic.   Eyes: EOM are normal. Right eye exhibits no discharge. Left eye exhibits no discharge. No scleral icterus.   Neck: Normal range of motion. Neck supple. JVD present.   Cardiovascular: Normal rate, regular rhythm, S1 normal, S2 normal, intact distal pulses and normal pulses.  No extrasystoles are present. Exam reveals gallop. Exam reveals no S3, no S4, no distant heart sounds, no friction rub and no opening snap.    Murmur heard.  Pulmonary/Chest: Effort normal. No respiratory distress. She has no wheezes. She has rales.   Abdominal: Soft. Bowel sounds are normal. She exhibits no distension. There is no tenderness.   Musculoskeletal: Normal range of motion. She exhibits edema. She exhibits no deformity.   Neurological: She is alert.   Skin: Skin is warm and dry. No rash noted. She is not diaphoretic. No erythema.   Nursing note and vitals reviewed.      Significant Labs: All pertinent lab results from the last 24 hours have been reviewed.    Significant Imaging: Reviewed

## 2020-03-08 NOTE — PLAN OF CARE
Patient is sitting up in her chair at bedside, awake and alert. Has some brief periods of apnea at times. VS taken and  WNL. Family present at bedside. Call light  within reach. Will cont to monitor.

## 2020-03-08 NOTE — PROGRESS NOTES
"  [facility] Hospital Medicine  Progress Note    Patient Name: Ciara Lozano  MRN: [id number]  Patient Class: IP- Inpatient   Admission Date: 3/5/2020  Length of Stay: 2 days  Attending Physician: Mechelle Gayle MD  Primary Care Provider: Andreina Gonzales MD    [facility] Medicine Team: [facility] MED D Mechelle Gayle MD    Subjective:     Principal Problem:Acute on chronic combined systolic and diastolic heart failure    Interval History:   Chief Complaint   Patient presents with    Leg Swelling     Pt reports BLE swelling that started 2 days ago. Pt ambulates with cane at baseline. Pts family states that pt "has been breathing hard after talking for a second or after she walks."     Follow up visit for Acute on chronic combined systolic and diastolic heart failure    History / Events Overnight: No significant events reported by Nursing. Patient with no new complaints. Generally weak. Family at bedside.    Data reviewed 3/8/2020:  H&H low but stable.     Review of Systems   Constitutional: Negative for fever.   Respiratory: Negative for shortness of breath.      Objective:     Vital Signs (Most Recent):  Temp: 98.6 °F (37 °C) (03/08/20 0737)  Pulse: 76 (03/08/20 1109)  Resp: 18 (03/08/20 1109)  BP: (!) 112/57 (03/08/20 0737)  SpO2: (!) 94 % (03/08/20 1109) Vital Signs (24h Range):  Temp:  [97.4 °F (36.3 °C)-98.6 °F (37 °C)] 98.6 °F (37 °C)  Pulse:  [65-80] 76  Resp:  [18-20] 18  SpO2:  [94 %-100 %] 94 %  BP: (112-125)/(56-59) 112/57     Weight: 75 kg (165 lb 5.5 oz)  Body mass index is 30.24 kg/m².    Intake/Output Summary (Last 24 hours) at 3/8/2020 1531  Last data filed at 3/8/2020 1230  Gross per 24 hour   Intake 520 ml   Output 2750 ml   Net -2230 ml      Physical Exam   Constitutional: She is cooperative. No distress.   Eyes: Conjunctivae and lids are normal. No scleral icterus.   Cardiovascular: Normal rate, regular rhythm, S1 normal and S2 normal.   Pulmonary/Chest: Effort normal and " breath sounds normal. She has no wheezes. She has no rhonchi.   Abdominal: Soft. Normal appearance and bowel sounds are normal. There is no tenderness.   Musculoskeletal: She exhibits no edema.   Neurological: She is alert. She is not disoriented. Gait abnormal.   Skin: Skin is warm and dry. No cyanosis. No pallor.   Psychiatric: Cognition and memory are impaired.     Significant Labs:     CBC:   Recent Labs   Lab 03/07/20  0456 03/08/20  0407 03/08/20  0905   WBC 5.14 4.78  --    HGB 7.0* 6.8* 7.1*   HCT 25.0* 23.5*  --     205  --      CMP:   Recent Labs   Lab 03/07/20  0456 03/08/20  0407    140   K 4.5 4.1    105   CO2 28 29   GLU 88 133*   BUN 25* 26*   CREATININE 1.0 1.2   CALCIUM 8.4* 8.0*   ALBUMIN  --  2.3*   ANIONGAP 6* 6*   EGFRNONAA 52.2* 41.9*     Magnesium:   Recent Labs   Lab 03/08/20  0407   MG 1.9     TSH:   Recent Labs   Lab 03/06/20  0356   TSH 0.337*     Significant Imaging:   ECHO 3/6  · Mild left ventricular enlargement.  · Eccentric left ventricular hypertrophy.  · Moderately decreased left ventricular systolic function. The estimated ejection fraction is 30%.  · Local segmental wall motion abnormalities.  · Grade II (moderate) left ventricular diastolic dysfunction consistent with pseudonormalization.  · Severe left atrial enlargement.  · Low normal right ventricular systolic function.  · Mild-to-moderate aortic regurgitation.  · Moderate mitral sclerosis.  · Moderate-to-severe mitral regurgitation.  · Mild tricuspid regurgitation.  · Normal central venous pressure (3 mmHg).  · The estimated PA systolic pressure is 38 mmHg.  · Small posterolateral pericardial effusion.    Assessment/Plan:      Active Diagnoses:    Diagnosis Date Noted POA    PRINCIPAL PROBLEM:  Acute on chronic combined systolic and diastolic heart failure [I50.43] 03/06/2020 Yes    Chronic renal failure, stage 3 (moderate) [N18.3] 03/06/2020 Yes    Anemia [D64.9] 03/06/2020 Yes    Memory deficits  [R41.3] 03/06/2020 Yes    Elevated troponin I level [R79.89] 03/06/2020 Yes    Carotid artery disease [I73.9]  Yes    CAD (coronary artery disease) [I25.10] 08/12/2014 Yes    LBBB (left bundle branch block) [I44.7] 07/16/2014 Yes    Hypertension [I10] 11/19/2012 Yes     Chronic    Hyperlipidemia LDL goal < 100 [E78.5] 11/19/2012 Yes     Chronic      Problems Resolved During this Admission:       Overview / ICU Course:    Ciara Lozano is a 84 y.o. female with medical history significant for CAD, HFpEF, HLD, HTN admitted for Acute on chronic diastolic heart failure.    Inpatient Medications Prescribed for Management of Current Problems:     Scheduled Meds:    [START ON 3/9/2020] amLODIPine  5 mg Oral Daily    aspirin  81 mg Oral Daily    atorvastatin  80 mg Oral Daily    famotidine  20 mg Oral Daily    ferrous sulfate  325 mg Oral Daily    furosemide (LASIX) IV  40 mg Intravenous BID    lisinopril  5 mg Oral Daily    metoprolol tartrate  12.5 mg Oral BID    polyethylene glycol  17 g Oral Daily     Continuous Infusions:   As Needed: acetaminophen, albuterol-ipratropium, Dextrose 10% Bolus, Dextrose 10% Bolus, glucagon (human recombinant), glucose, glucose, ketorolac, melatonin, ondansetron, promethazine (PHENERGAN) IVPB, senna-docusate 8.6-50 mg, sodium chloride 0.9%    Assessment and Plan by Problem    Acute on chronic systolic and diastolic heart failure  Hypervolemic on admission exam and imaging and BNP significantly elevated to 2600 on admission  - Last echo 2014 with low-normal EF and diastolic dysfunction  - on HCTZ as OP  - unclear if patient has been compliant with her home meds and even states that the granddaughter she lives with is in the hospital.  - patient is Lasix-naive and was given 80 mg IVP of Lasix in ED.    - Started Lasix 40 mg IVP BID; assess response symptomatically and renal function.    - monitor response with daily weights, strict I/Os, oxygen requirements  - Na  restricted/fluid restricted diet 1500 mL  - TTE with EF 30% now; Consulted Cardiology: start GDMT and titrate as needed. No indication for Plavix. Start lopressor 25 mg PO BID (can transition to Toprol XL at discharge). Low dose ACE/ARB - home  lisinopril resumed at lower dose. Continue Lasix IV. Cardiac PET stress when more stable.  - Decreased amlodipine in order to start BB and ACEi    Anemia  Hg 7.7, down from baseline of 9 -11 last year  - iron studies consistent with iron deficiency and chronic disease, TSH slightly low, FT4 normal.  B12 and folate normal.     CAD (coronary artery disease)  Carotid artery disease  Hyperlipidemia LDL goal < 100  - continue with DAPT, statin  - last lipid studies 5/2019 benign     Hypertension  Controlled  - hold HCTZ   - held lisinopril 40 mg PO daily as she is normotensive and unclear if compliant with home meds  - continued amlodipine 10 mg PO daily  - decreased amlodipine as resumed lower dose lisinopril and started BB.     Memory deficits  Likely has unspecified vs vascular dementia  - PT/OT consulted for debility  - delirium precautions in place, fall precautions     Chronic renal failure, stage 3 (moderate)  Chronic and stable     LBBB (left bundle branch block)  Chronic and stable     Elevated troponin I level  Likely type II NSTEMI from demand/CHF exacerbation.  Concern for worsening EF; consulting Cardiology  Plan for PET Stress    Discharge plan and follow up  [facility] [place]  [facility] 3/9/2020  Previous admission:  3/31/15  Goals of Care:  General Prognosis: good  Goals: Curative  Comfort Only: No  Hospice: No  Code Status: Full Code    Diet: Diet [facility]; Fluid - 1500mL  GI Prophylaxis: Indicated due to multiple minor risk factors  Significant LDAs:   IV Access Type: Peripheral  Urinary Catheter Indication if present: Patient Does Not Have Urinary Catheter  Other Lines/Tubes/Drains:    VTE Risk Mitigation (From admission, onward)         Ordered      IP VTE HIGH RISK PATIENT  Once      03/06/20 0211     Place ZORAIDA hose  Until discontinued      03/06/20 0211     Place sequential compression device  Until discontinued      03/06/20 0211                 Mechelle Gayle MD  Department of Hospital Medicine   [facility]

## 2020-03-08 NOTE — PLAN OF CARE
Patient lying in bed, awake and alert. Family at bedside. No s/s of acute distress. The patient has the purewick in use at this time. Bed alarms on. Call light within reach. bed locked and in low position.

## 2020-03-08 NOTE — HPI
Ms. Lozano is an 84y lady with HTN, bilateral carotid stenosis, and CAD s/p PCI with DELROY to pLAD across aneurysm, DELROY to dLCx , and DELROY to  OM4 on 9/2/14 who presented with ADHF. Cardiology consulted for newly reduced EF. Challenging to obtain accurate history, but seems to be reporting dyspnea over the past few days. Denies orthopnea (1 pillow at night which has been stable). Since admit, she was being diuresing with lasix IV (currenlty 40 IV BID) with improvement of symptoms. Net negative 4.7L since admit. Previosly EF 52%. Echo with EF 30% with mid-apical AS akinesis with global hypokinesis otherwise, G2DD, severe LAE, mod-severe MR, mild-mod AR, and PASP 38 with IVC 3 mmHg. CXR on admit with bilateral pleural effusion and perihilar congestion. ECG with ST, LBBB (old), and QTc prolongation. Trops flat at 0.07-0.09. BNP 2600 on admit. Takes baby aspirin at home. Not on Plavix.

## 2020-03-08 NOTE — ASSESSMENT & PLAN NOTE
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 Clinically patient is still volume overloaded (+JVD and HJR). Would not use peripheral edema as marker given albumin 2.3. Significant anemia of 6.8 (previously 11.2) and c/w LINDA (no known blood loss per patient). From cardiac standpoint, would continue to diuresis and obtain euvolemia. Patient can undergo stress test to evaluate for high risk features requiring LHC in future. Would avoid invasive procedures with significant new anemia. Will need to start GDMT and titrate as needed. No indication for plavix, and in a patient with significant new anemia would recommend d/c.    Recommendations:  - Start lopressor 25 mg PO BID (can transition to Toprol XL at discharge)  - Low dose ACE/ARB  - Continue lasix IV with net goal out 1.5L today  - Cardiac PET stress

## 2020-03-09 LAB
ALBUMIN SERPL BCP-MCNC: 2.5 G/DL (ref 3.5–5.2)
ANION GAP SERPL CALC-SCNC: 5 MMOL/L (ref 8–16)
BASOPHILS # BLD AUTO: 0.01 K/UL (ref 0–0.2)
BASOPHILS NFR BLD: 0.2 % (ref 0–1.9)
BUN SERPL-MCNC: 26 MG/DL (ref 8–23)
CALCIUM SERPL-MCNC: 8.4 MG/DL (ref 8.7–10.5)
CHLORIDE SERPL-SCNC: 102 MMOL/L (ref 95–110)
CO2 SERPL-SCNC: 29 MMOL/L (ref 23–29)
CREAT SERPL-MCNC: 1.1 MG/DL (ref 0.5–1.4)
DIFFERENTIAL METHOD: ABNORMAL
EOSINOPHIL # BLD AUTO: 0.1 K/UL (ref 0–0.5)
EOSINOPHIL NFR BLD: 2.1 % (ref 0–8)
ERYTHROCYTE [DISTWIDTH] IN BLOOD BY AUTOMATED COUNT: 15.4 % (ref 11.5–14.5)
EST. GFR  (AFRICAN AMERICAN): 53.7 ML/MIN/1.73 M^2
EST. GFR  (NON AFRICAN AMERICAN): 46.5 ML/MIN/1.73 M^2
GLUCOSE SERPL-MCNC: 73 MG/DL (ref 70–110)
HCT VFR BLD AUTO: 25 % (ref 37–48.5)
HGB BLD-MCNC: 7.2 G/DL (ref 12–16)
IMM GRANULOCYTES # BLD AUTO: 0.04 K/UL (ref 0–0.04)
IMM GRANULOCYTES NFR BLD AUTO: 0.8 % (ref 0–0.5)
LYMPHOCYTES # BLD AUTO: 1.5 K/UL (ref 1–4.8)
LYMPHOCYTES NFR BLD: 30.7 % (ref 18–48)
MAGNESIUM SERPL-MCNC: 1.9 MG/DL (ref 1.6–2.6)
MCH RBC QN AUTO: 24.2 PG (ref 27–31)
MCHC RBC AUTO-ENTMCNC: 28.8 G/DL (ref 32–36)
MCV RBC AUTO: 84 FL (ref 82–98)
MONOCYTES # BLD AUTO: 0.5 K/UL (ref 0.3–1)
MONOCYTES NFR BLD: 9.5 % (ref 4–15)
NEUTROPHILS # BLD AUTO: 2.8 K/UL (ref 1.8–7.7)
NEUTROPHILS NFR BLD: 56.7 % (ref 38–73)
NRBC BLD-RTO: 0 /100 WBC
PHOSPHATE SERPL-MCNC: 3 MG/DL (ref 2.7–4.5)
PLATELET # BLD AUTO: 207 K/UL (ref 150–350)
PMV BLD AUTO: 11.7 FL (ref 9.2–12.9)
POTASSIUM SERPL-SCNC: 4.2 MMOL/L (ref 3.5–5.1)
RBC # BLD AUTO: 2.98 M/UL (ref 4–5.4)
SODIUM SERPL-SCNC: 136 MMOL/L (ref 136–145)
WBC # BLD AUTO: 4.86 K/UL (ref 3.9–12.7)

## 2020-03-09 PROCEDURE — 94761 N-INVAS EAR/PLS OXIMETRY MLT: CPT

## 2020-03-09 PROCEDURE — 36415 COLL VENOUS BLD VENIPUNCTURE: CPT

## 2020-03-09 PROCEDURE — 25000003 PHARM REV CODE 250: Performed by: PHYSICIAN ASSISTANT

## 2020-03-09 PROCEDURE — 80069 RENAL FUNCTION PANEL: CPT

## 2020-03-09 PROCEDURE — 83735 ASSAY OF MAGNESIUM: CPT

## 2020-03-09 PROCEDURE — 99232 PR SUBSEQUENT HOSPITAL CARE,LEVL II: ICD-10-PCS | Mod: ,,, | Performed by: INTERNAL MEDICINE

## 2020-03-09 PROCEDURE — 20600001 HC STEP DOWN PRIVATE ROOM

## 2020-03-09 PROCEDURE — 25000003 PHARM REV CODE 250: Performed by: INTERNAL MEDICINE

## 2020-03-09 PROCEDURE — 85025 COMPLETE CBC W/AUTO DIFF WBC: CPT

## 2020-03-09 PROCEDURE — 99232 SBSQ HOSP IP/OBS MODERATE 35: CPT | Mod: ,,, | Performed by: INTERNAL MEDICINE

## 2020-03-09 PROCEDURE — 99231 PR SUBSEQUENT HOSPITAL CARE,LEVL I: ICD-10-PCS | Mod: GC,,, | Performed by: INTERNAL MEDICINE

## 2020-03-09 PROCEDURE — 99231 SBSQ HOSP IP/OBS SF/LOW 25: CPT | Mod: GC,,, | Performed by: INTERNAL MEDICINE

## 2020-03-09 PROCEDURE — 27000221 HC OXYGEN, UP TO 24 HOURS

## 2020-03-09 PROCEDURE — 63600175 PHARM REV CODE 636 W HCPCS: Performed by: INTERNAL MEDICINE

## 2020-03-09 RX ORDER — METOPROLOL TARTRATE 25 MG/1
25 TABLET, FILM COATED ORAL 2 TIMES DAILY
Status: DISCONTINUED | OUTPATIENT
Start: 2020-03-09 | End: 2020-03-11 | Stop reason: HOSPADM

## 2020-03-09 RX ORDER — AMLODIPINE BESYLATE 2.5 MG/1
2.5 TABLET ORAL DAILY
Status: DISCONTINUED | OUTPATIENT
Start: 2020-03-10 | End: 2020-03-10

## 2020-03-09 RX ADMIN — METOPROLOL TARTRATE 25 MG: 25 TABLET ORAL at 08:03

## 2020-03-09 RX ADMIN — FAMOTIDINE 20 MG: 20 TABLET ORAL at 09:03

## 2020-03-09 RX ADMIN — AMLODIPINE BESYLATE 5 MG: 5 TABLET ORAL at 09:03

## 2020-03-09 RX ADMIN — ATORVASTATIN CALCIUM 80 MG: 20 TABLET, FILM COATED ORAL at 09:03

## 2020-03-09 RX ADMIN — FUROSEMIDE 40 MG: 10 INJECTION, SOLUTION INTRAMUSCULAR; INTRAVENOUS at 09:03

## 2020-03-09 RX ADMIN — FERROUS SULFATE TAB EC 325 MG (65 MG FE EQUIVALENT) 325 MG: 325 (65 FE) TABLET DELAYED RESPONSE at 09:03

## 2020-03-09 RX ADMIN — POLYETHYLENE GLYCOL 3350 17 G: 17 POWDER, FOR SOLUTION ORAL at 09:03

## 2020-03-09 RX ADMIN — METOPROLOL TARTRATE 12.5 MG: 25 TABLET, FILM COATED ORAL at 09:03

## 2020-03-09 RX ADMIN — LISINOPRIL 5 MG: 5 TABLET ORAL at 09:03

## 2020-03-09 RX ADMIN — FUROSEMIDE 40 MG: 10 INJECTION, SOLUTION INTRAMUSCULAR; INTRAVENOUS at 06:03

## 2020-03-09 RX ADMIN — ASPIRIN 81 MG: 81 TABLET, COATED ORAL at 09:03

## 2020-03-09 NOTE — ASSESSMENT & PLAN NOTE
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 Clinically patient is still volume overloaded (+JVD and HJR). Would not use peripheral edema as marker given albumin 2.3. Significant anemia of 6.8 (previously 11.2) and c/w LINDA (no known blood loss per patient). From cardiac standpoint, would continue to diuresis and obtain euvolemia. Patient can undergo stress test to evaluate for high risk features requiring LHC in future. Would avoid invasive procedures with significant new anemia. Will need to start GDMT and titrate as needed. No indication for plavix, and in a patient with significant new anemia would recommend d/c.    Recommendations:  - Start lopressor 25 mg PO BID (can transition to Toprol XL at discharge)  - Low dose ACE/ARB  - Continue lasix IV with net goal out 1.5L today  - Cardiac PET stress, can be done outpatient

## 2020-03-09 NOTE — PROGRESS NOTES
"  [facility] Hospital Medicine  Progress Note    Patient Name: Ciara Lozano  MRN: [id number]  Patient Class: IP- Inpatient   Admission Date: 3/5/2020  Length of Stay: 3 days  Attending Physician: Mechelle Gayle MD  Primary Care Provider: Andrenia Gonzales MD    [facility] Medicine Team: [facility] MED D Mechelle Gayle MD    Subjective:     Principal Problem:Acute on chronic combined systolic and diastolic heart failure    Interval History:   Chief Complaint   Patient presents with    Leg Swelling     Pt reports BLE swelling that started 2 days ago. Pt ambulates with cane at baseline. Pts family states that pt "has been breathing hard after talking for a second or after she walks."     Follow up visit for Acute on chronic combined systolic and diastolic heart failure    History / Events Overnight: No significant events reported by Nursing. Patient with no new complaints reported    Data reviewed 3/9/2020:  H&H low but stable. Albumin is low.    Review of Systems   Constitutional: Negative for fever.   Respiratory: Negative for shortness of breath.      Objective:     Vital Signs (Most Recent):  Temp: 97.6 °F (36.4 °C) (03/09/20 1633)  Pulse: 65 (03/09/20 1633)  Resp: 16 (03/09/20 1633)  BP: (!) 105/53 (03/09/20 1633)  SpO2: (!) 94 % (03/09/20 1633) Vital Signs (24h Range):  Temp:  [97.4 °F (36.3 °C)-98.5 °F (36.9 °C)] 97.6 °F (36.4 °C)  Pulse:  [59-78] 65  Resp:  [15-19] 16  SpO2:  [94 %-99 %] 94 %  BP: (105-137)/(49-64) 105/53     Weight: 74.9 kg (165 lb 2 oz)  Body mass index is 30.2 kg/m².    Intake/Output Summary (Last 24 hours) at 3/9/2020 1708  Last data filed at 3/9/2020 1300  Gross per 24 hour   Intake 480 ml   Output 730 ml   Net -250 ml      Physical Exam   Constitutional: She is sleeping. No distress.   Eyes: Conjunctivae and lids are normal. No scleral icterus.   Cardiovascular: Normal rate, regular rhythm, S1 normal and S2 normal.   Pulmonary/Chest: Effort normal and breath sounds " normal. She has no wheezes. She has no rhonchi.   Abdominal: Soft. Normal appearance and bowel sounds are normal. There is no tenderness.   Musculoskeletal: She exhibits edema.   Neurological: She is not disoriented. Gait abnormal.   Skin: Skin is warm and dry. No cyanosis. No pallor.   Psychiatric: Cognition and memory are impaired.     Significant Labs:     CBC:   Recent Labs   Lab 03/08/20  0407 03/08/20  0905 03/09/20  0459   WBC 4.78  --  4.86   HGB 6.8* 7.1* 7.2*   HCT 23.5*  --  25.0*     --  207     CMP:   Recent Labs   Lab 03/08/20  0407 03/09/20  0458    136   K 4.1 4.2    102   CO2 29 29   * 73   BUN 26* 26*   CREATININE 1.2 1.1   CALCIUM 8.0* 8.4*   ALBUMIN 2.3* 2.5*   ANIONGAP 6* 5*   EGFRNONAA 41.9* 46.5*     Magnesium:   Recent Labs   Lab 03/08/20 0407 03/09/20  0458   MG 1.9 1.9     TSH:   Recent Labs   Lab 03/06/20  0356   TSH 0.337*     Significant Imaging:   ECHO 3/6  · Mild left ventricular enlargement.  · Eccentric left ventricular hypertrophy.  · Moderately decreased left ventricular systolic function. The estimated ejection fraction is 30%.  · Local segmental wall motion abnormalities.  · Grade II (moderate) left ventricular diastolic dysfunction consistent with pseudonormalization.  · Severe left atrial enlargement.  · Low normal right ventricular systolic function.  · Mild-to-moderate aortic regurgitation.  · Moderate mitral sclerosis.  · Moderate-to-severe mitral regurgitation.  · Mild tricuspid regurgitation.  · Normal central venous pressure (3 mmHg).  · The estimated PA systolic pressure is 38 mmHg.  · Small posterolateral pericardial effusion.    Assessment/Plan:      Active Diagnoses:    Diagnosis Date Noted POA    PRINCIPAL PROBLEM:  Acute on chronic combined systolic and diastolic heart failure [I50.43] 03/06/2020 Yes    Chronic renal failure, stage 3 (moderate) [N18.3] 03/06/2020 Yes    Anemia [D64.9] 03/06/2020 Yes    Memory deficits [R41.3]  03/06/2020 Yes    Elevated troponin I level [R79.89] 03/06/2020 Yes    Carotid artery disease [I73.9]  Yes    CAD (coronary artery disease) [I25.10] 08/12/2014 Yes    LBBB (left bundle branch block) [I44.7] 07/16/2014 Yes    Hypertension [I10] 11/19/2012 Yes     Chronic    Hyperlipidemia LDL goal < 100 [E78.5] 11/19/2012 Yes     Chronic      Problems Resolved During this Admission:       Overview / ICU Course:    Ciara Lozano is a 84 y.o. female with medical history significant for CAD, HFpEF, HLD, HTN admitted for Acute on chronic systolic & diastolic heart failure.    Inpatient Medications Prescribed for Management of Current Problems:     Scheduled Meds:    [START ON 3/10/2020] amLODIPine  2.5 mg Oral Daily    aspirin  81 mg Oral Daily    atorvastatin  80 mg Oral Daily    famotidine  20 mg Oral Daily    ferrous sulfate  325 mg Oral Daily    furosemide (LASIX) IV  40 mg Intravenous BID    lisinopril  5 mg Oral Daily    metoprolol tartrate  25 mg Oral BID    polyethylene glycol  17 g Oral Daily     Continuous Infusions:   As Needed: acetaminophen, albuterol-ipratropium, Dextrose 10% Bolus, Dextrose 10% Bolus, glucagon (human recombinant), glucose, glucose, melatonin, ondansetron, promethazine (PHENERGAN) IVPB, senna-docusate 8.6-50 mg, sodium chloride 0.9%    Assessment and Plan by Problem    Acute on chronic systolic and diastolic heart failure  Hypervolemic on admission exam and imaging and BNP significantly elevated to 2600 on admission  - Last echo 2014 with low-normal EF and diastolic dysfunction  - on HCTZ as OP  - unclear if patient has been compliant with her home meds and even states that the granddaughter she lives with is in the hospital.  - patient is Lasix-naive and was given 80 mg IVP of Lasix in ED.    - Started Lasix 40 mg IVP BID; assess response symptomatically and renal function.    - monitor response with daily weights, strict I/Os, oxygen requirements  - Na restricted/fluid  restricted diet 1500 mL  - TTE with EF 30% now; Consulted Cardiology: start GDMT and titrate as needed. No indication for Plavix. Start lopressor 25 mg PO BID (can transition to Toprol XL at discharge). Low dose ACE/ARB - home  lisinopril resumed at lower dose. Continue Lasix IV. Cardiac PET stress when more stable.  - Decreased amlodipine in order to start BB and ACEi  - per Cardiology: Lopressor 25 mg PO BID (transition to Toprol XL at discharge). Low dose ACE/ARB. Continue Lasix IV. Cardiac PET stress, as outpatient    Anemia  Hg 7.7, down from baseline of 9 -11 last year  - iron studies consistent with iron deficiency and chronic disease, TSH slightly low, FT4 normal.  B12 and folate normal.     CAD (coronary artery disease)  Carotid artery disease  Hyperlipidemia LDL goal < 100  - continue with DAPT, statin  - last lipid studies 5/2019 benign     Hypertension  Controlled  - hold HCTZ   - held lisinopril 40 mg PO daily as she is normotensive and unclear if compliant with home meds  - continued amlodipine 10 mg PO daily  - decreased amlodipine as resumed lower dose lisinopril and started BB.     Memory deficits  Likely has unspecified vs vascular dementia  - PT/OT consulted for debility  - delirium precautions in place, fall precautions     Chronic renal failure, stage 3 (moderate)  Chronic and stable     LBBB (left bundle branch block)  Chronic and stable     Elevated troponin I level  Likely type II NSTEMI from demand/CHF exacerbation.  Concern for worsening EF; consulting Cardiology  Plan for PET Stress as OP    Discharge plan and follow up  [facility]-Health Care [facility] [place]  [facility] 3/11/2020  Previous admission:  3/31/15  Goals of Care:  General Prognosis: good  Goals: Curative  Comfort Only: No  Hospice: No  Code Status: Full Code    Diet: Diet [facility]; Fluid - 1500mL  GI Prophylaxis: Indicated due to multiple minor risk factors  Significant LDAs:   IV Access Type: Peripheral  Urinary Catheter Indication if  present: Patient Does Not Have Urinary Catheter  Other Lines/Tubes/Drains:    VTE Risk Mitigation (From admission, onward)         Ordered     IP VTE HIGH RISK PATIENT  Once      03/06/20 0211     Place ZORAIDA hose  Until discontinued      03/06/20 0211     Place sequential compression device  Until discontinued      03/06/20 0211                 Mechelle Gayle MD  Department of Hospital Medicine   [facility]

## 2020-03-09 NOTE — PROGRESS NOTES
[facility]  Cardiology  Progress Note    Patient Name: Ciara Lozano  MRN: [id number]  Admission Date: 3/5/2020  Hospital Length of Stay: 3 days  Code Status: Full Code   Attending Physician: Mechelle Gayle MD   Primary Care Physician: Andreina Gonzales MD  Expected Discharge Date: 3/11/2020  Principal Problem:Acute on chronic combined systolic and diastolic heart failure    Subjective:       Interval History: NAEON. Afebrile, VSS. Continuing to diurese, still fluid overloaded on exam.     Past Medical History:   Diagnosis Date    CAD (coronary artery disease) 8/12/2014    Carotid artery disease     Congestive heart failure, unspecified     Encounter for blood transfusion     Hemangioma 4/2/2013    Hyperlipidemia     Hypertension     Peripheral vascular disease     Syncope and collapse        Past Surgical History:   Procedure Laterality Date    CARDIAC CATHETERIZATION      cataracts      CORONARY ANGIOPLASTY         Review of patient's allergies indicates:   Allergen Reactions    Pollen extracts        No current facility-administered medications on file prior to encounter.      Current Outpatient Medications on File Prior to Encounter   Medication Sig    amLODIPine (NORVASC) 10 MG tablet TAKE 1 TABLET(10 MG) BY MOUTH EVERY DAY    aspirin (ECOTRIN) 81 MG EC tablet Take 81 mg by mouth once daily.    atorvastatin (LIPITOR) 80 MG tablet 1 po daily    clopidogrel (PLAVIX) 75 mg tablet 1 po daily    hydroCHLOROthiazide (HYDRODIURIL) 25 MG tablet TAKE 1 TABLET(25 MG) BY MOUTH EVERY DAY    lisinopril (PRINIVIL,ZESTRIL) 40 MG tablet 1 po daily    nitroGLYCERIN (NITROSTAT) 0.4 MG SL tablet Place 1 tablet (0.4 mg total) under the tongue every 5 (five) minutes as needed for Chest pain (no more tiana 3 doses).     Family History     Problem Relation (Age of Onset)    Anuerysm Sister    Hypertension Mother, Father    No Known Problems Maternal Grandmother, Maternal Grandfather, Paternal  Grandmother, Paternal Grandfather, Brother, Sister, Maternal Aunt, Maternal Uncle, Paternal Aunt, Paternal Uncle        Tobacco Use    Smoking status: Former Smoker     Packs/day: 0.25     Years: 20.00     Pack years: 5.00     Last attempt to quit: 1995     Years since quittin.3   Substance and Sexual Activity    Alcohol use: No     Alcohol/week: 0.0 standard drinks     Comment: h/o regular ETOH use, now drinks only on holidays    Drug use: No    Sexual activity: Not Currently     Review of Systems   Constitution: Negative for chills and fever.   Cardiovascular: Positive for dyspnea on exertion. Negative for chest pain, claudication, irregular heartbeat, leg swelling, near-syncope, orthopnea, palpitations, paroxysmal nocturnal dyspnea and syncope.   Respiratory: Positive for shortness of breath. Negative for cough.    Musculoskeletal: Negative for joint pain and joint swelling.   Gastrointestinal: Negative for abdominal pain, nausea and vomiting.   Neurological: Negative for focal weakness and headaches.   All other systems reviewed and are negative.    Objective:     Vital Signs (Most Recent):  Temp: 97.5 °F (36.4 °C) (20)  Pulse: 70 (20)  Resp: 15 (20)  BP: (!) 123/58 (20)  SpO2: 96 % (20) Vital Signs (24h Range):  Temp:  [97.4 °F (36.3 °C)-98.3 °F (36.8 °C)] 97.5 °F (36.4 °C)  Pulse:  [66-78] 70  Resp:  [15-19] 15  SpO2:  [94 %-99 %] 96 %  BP: (106-137)/(53-64) 123/58     Weight: 74.9 kg (165 lb 2 oz)  Body mass index is 30.2 kg/m².    SpO2: 96 %  O2 Device (Oxygen Therapy): nasal cannula      Intake/Output Summary (Last 24 hours) at 3/9/2020 1105  Last data filed at 3/9/2020 0800  Gross per 24 hour   Intake 480 ml   Output 930 ml   Net -450 ml       Lines/Drains/Airways     Peripheral Intravenous Line                 Peripheral IV - Single Lumen 20 2133 20 G Left Antecubital 3 days                Physical Exam   Constitutional: She  appears well-developed and well-nourished. No distress.   HENT:   Head: Normocephalic and atraumatic.   Eyes: EOM are normal. Right eye exhibits no discharge. Left eye exhibits no discharge. No scleral icterus.   Neck: Normal range of motion. Neck supple. JVD present.   Cardiovascular: Normal rate, regular rhythm, S1 normal, S2 normal, intact distal pulses and normal pulses.  No extrasystoles are present. Exam reveals gallop. Exam reveals no S3, no S4, no distant heart sounds, no friction rub and no opening snap.   Murmur heard.  Pulmonary/Chest: Effort normal. No respiratory distress. She has no wheezes. She has rales.   Abdominal: Soft. Bowel sounds are normal. She exhibits no distension. There is no tenderness.   Musculoskeletal: Normal range of motion. She exhibits edema. She exhibits no deformity.   Neurological: She is alert.   Skin: Skin is warm and dry. No rash noted. She is not diaphoretic. No erythema.   Nursing note and vitals reviewed.      Significant Labs: All pertinent lab results from the last 24 hours have been reviewed.    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 Clinically patient is still volume overloaded (+JVD and HJR). Would not use peripheral edema as marker given albumin 2.3. Significant anemia of 6.8 (previously 11.2) and c/w LINDA (no known blood loss per patient). From cardiac standpoint, would continue to diuresis and obtain euvolemia. Patient can undergo stress test to evaluate for high risk features requiring LHC in future. Would avoid invasive procedures with significant new anemia. Will need to start GDMT and titrate as needed. No indication for plavix, and in a patient with significant new anemia would recommend d/c.    Recommendations:  - Start lopressor 25 mg PO BID  (can transition to Toprol XL at discharge)  - Low dose ACE/ARB  - Continue lasix IV with net goal out 1.5L today  - Cardiac PET stress, can be done outpatient        VTE Risk Mitigation (From admission, onward)         Ordered     IP VTE HIGH RISK PATIENT  Once      03/06/20 0211     Place ZORAIDA hose  Until discontinued      03/06/20 0211     Place sequential compression device  Until discontinued      03/06/20 0211                Young Johns MD  Cardiology  [facility]

## 2020-03-09 NOTE — PLAN OF CARE
Plan of care reviewed with patient. No significant changes this shift. VSS. Continues on Lasix IV. Rounding and safety maintained. No falls, will continue to monitor

## 2020-03-09 NOTE — SUBJECTIVE & OBJECTIVE
Interval History: NAEON. Afebrile, VSS. Continuing to diurese, still fluid overloaded on exam.     Past Medical History:   Diagnosis Date    CAD (coronary artery disease) 2014    Carotid artery disease     Congestive heart failure, unspecified     Encounter for blood transfusion     Hemangioma 2013    Hyperlipidemia     Hypertension     Peripheral vascular disease     Syncope and collapse        Past Surgical History:   Procedure Laterality Date    CARDIAC CATHETERIZATION      cataracts      CORONARY ANGIOPLASTY         Review of patient's allergies indicates:   Allergen Reactions    Pollen extracts        No current facility-administered medications on file prior to encounter.      Current Outpatient Medications on File Prior to Encounter   Medication Sig    amLODIPine (NORVASC) 10 MG tablet TAKE 1 TABLET(10 MG) BY MOUTH EVERY DAY    aspirin (ECOTRIN) 81 MG EC tablet Take 81 mg by mouth once daily.    atorvastatin (LIPITOR) 80 MG tablet 1 po daily    clopidogrel (PLAVIX) 75 mg tablet 1 po daily    hydroCHLOROthiazide (HYDRODIURIL) 25 MG tablet TAKE 1 TABLET(25 MG) BY MOUTH EVERY DAY    lisinopril (PRINIVIL,ZESTRIL) 40 MG tablet 1 po daily    nitroGLYCERIN (NITROSTAT) 0.4 MG SL tablet Place 1 tablet (0.4 mg total) under the tongue every 5 (five) minutes as needed for Chest pain (no more tiana 3 doses).     Family History     Problem Relation (Age of Onset)    Anuerysm Sister    Hypertension Mother, Father    No Known Problems Maternal Grandmother, Maternal Grandfather, Paternal Grandmother, Paternal Grandfather, Brother, Sister, Maternal Aunt, Maternal Uncle, Paternal Aunt, Paternal Uncle        Tobacco Use    Smoking status: Former Smoker     Packs/day: 0.25     Years: 20.00     Pack years: 5.00     Last attempt to quit: 1995     Years since quittin.3   Substance and Sexual Activity    Alcohol use: No     Alcohol/week: 0.0 standard drinks     Comment: h/o regular ETOH use,  now drinks only on holidays    Drug use: No    Sexual activity: Not Currently     Review of Systems   Constitution: Negative for chills and fever.   Cardiovascular: Positive for dyspnea on exertion. Negative for chest pain, claudication, irregular heartbeat, leg swelling, near-syncope, orthopnea, palpitations, paroxysmal nocturnal dyspnea and syncope.   Respiratory: Positive for shortness of breath. Negative for cough.    Musculoskeletal: Negative for joint pain and joint swelling.   Gastrointestinal: Negative for abdominal pain, nausea and vomiting.   Neurological: Negative for focal weakness and headaches.   All other systems reviewed and are negative.    Objective:     Vital Signs (Most Recent):  Temp: 97.5 °F (36.4 °C) (03/09/20 0749)  Pulse: 70 (03/09/20 0749)  Resp: 15 (03/09/20 0749)  BP: (!) 123/58 (03/09/20 0749)  SpO2: 96 % (03/09/20 0749) Vital Signs (24h Range):  Temp:  [97.4 °F (36.3 °C)-98.3 °F (36.8 °C)] 97.5 °F (36.4 °C)  Pulse:  [66-78] 70  Resp:  [15-19] 15  SpO2:  [94 %-99 %] 96 %  BP: (106-137)/(53-64) 123/58     Weight: 74.9 kg (165 lb 2 oz)  Body mass index is 30.2 kg/m².    SpO2: 96 %  O2 Device (Oxygen Therapy): nasal cannula      Intake/Output Summary (Last 24 hours) at 3/9/2020 1105  Last data filed at 3/9/2020 0800  Gross per 24 hour   Intake 480 ml   Output 930 ml   Net -450 ml       Lines/Drains/Airways     Peripheral Intravenous Line                 Peripheral IV - Single Lumen 03/05/20 2133 20 G Left Antecubital 3 days                Physical Exam   Constitutional: She appears well-developed and well-nourished. No distress.   HENT:   Head: Normocephalic and atraumatic.   Eyes: EOM are normal. Right eye exhibits no discharge. Left eye exhibits no discharge. No scleral icterus.   Neck: Normal range of motion. Neck supple. JVD present.   Cardiovascular: Normal rate, regular rhythm, S1 normal, S2 normal, intact distal pulses and normal pulses.  No extrasystoles are present. Exam reveals  gallop. Exam reveals no S3, no S4, no distant heart sounds, no friction rub and no opening snap.   Murmur heard.  Pulmonary/Chest: Effort normal. No respiratory distress. She has no wheezes. She has rales.   Abdominal: Soft. Bowel sounds are normal. She exhibits no distension. There is no tenderness.   Musculoskeletal: Normal range of motion. She exhibits edema. She exhibits no deformity.   Neurological: She is alert.   Skin: Skin is warm and dry. No rash noted. She is not diaphoretic. No erythema.   Nursing note and vitals reviewed.      Significant Labs: All pertinent lab results from the last 24 hours have been reviewed.    Significant Imaging: Reviewed

## 2020-03-10 PROBLEM — I50.42 CHRONIC COMBINED SYSTOLIC AND DIASTOLIC HEART FAILURE: Status: ACTIVE | Noted: 2020-03-06

## 2020-03-10 PROBLEM — R79.89 ELEVATED TROPONIN I LEVEL: Status: RESOLVED | Noted: 2020-03-06 | Resolved: 2020-03-10

## 2020-03-10 LAB
ALBUMIN SERPL BCP-MCNC: 2.4 G/DL (ref 3.5–5.2)
ANION GAP SERPL CALC-SCNC: 6 MMOL/L (ref 8–16)
BASOPHILS # BLD AUTO: 0.01 K/UL (ref 0–0.2)
BASOPHILS NFR BLD: 0.2 % (ref 0–1.9)
BUN SERPL-MCNC: 27 MG/DL (ref 8–23)
CALCIUM SERPL-MCNC: 8.7 MG/DL (ref 8.7–10.5)
CHLORIDE SERPL-SCNC: 102 MMOL/L (ref 95–110)
CO2 SERPL-SCNC: 29 MMOL/L (ref 23–29)
CREAT SERPL-MCNC: 1 MG/DL (ref 0.5–1.4)
DIFFERENTIAL METHOD: ABNORMAL
EOSINOPHIL # BLD AUTO: 0.1 K/UL (ref 0–0.5)
EOSINOPHIL NFR BLD: 1.5 % (ref 0–8)
ERYTHROCYTE [DISTWIDTH] IN BLOOD BY AUTOMATED COUNT: 15.5 % (ref 11.5–14.5)
EST. GFR  (AFRICAN AMERICAN): >60 ML/MIN/1.73 M^2
EST. GFR  (NON AFRICAN AMERICAN): 52.2 ML/MIN/1.73 M^2
GLUCOSE SERPL-MCNC: 78 MG/DL (ref 70–110)
HCT VFR BLD AUTO: 24.4 % (ref 37–48.5)
HGB BLD-MCNC: 7 G/DL (ref 12–16)
IMM GRANULOCYTES # BLD AUTO: 0.03 K/UL (ref 0–0.04)
IMM GRANULOCYTES NFR BLD AUTO: 0.6 % (ref 0–0.5)
LYMPHOCYTES # BLD AUTO: 1.3 K/UL (ref 1–4.8)
LYMPHOCYTES NFR BLD: 28.4 % (ref 18–48)
MAGNESIUM SERPL-MCNC: 1.9 MG/DL (ref 1.6–2.6)
MCH RBC QN AUTO: 23.8 PG (ref 27–31)
MCHC RBC AUTO-ENTMCNC: 28.7 G/DL (ref 32–36)
MCV RBC AUTO: 83 FL (ref 82–98)
MONOCYTES # BLD AUTO: 0.5 K/UL (ref 0.3–1)
MONOCYTES NFR BLD: 11.7 % (ref 4–15)
NEUTROPHILS # BLD AUTO: 2.7 K/UL (ref 1.8–7.7)
NEUTROPHILS NFR BLD: 57.6 % (ref 38–73)
NRBC BLD-RTO: 0 /100 WBC
PHOSPHATE SERPL-MCNC: 2.7 MG/DL (ref 2.7–4.5)
PLATELET # BLD AUTO: 202 K/UL (ref 150–350)
PMV BLD AUTO: 11.7 FL (ref 9.2–12.9)
POTASSIUM SERPL-SCNC: 4.2 MMOL/L (ref 3.5–5.1)
RBC # BLD AUTO: 2.94 M/UL (ref 4–5.4)
SODIUM SERPL-SCNC: 137 MMOL/L (ref 136–145)
WBC # BLD AUTO: 4.62 K/UL (ref 3.9–12.7)

## 2020-03-10 PROCEDURE — 99232 SBSQ HOSP IP/OBS MODERATE 35: CPT | Mod: ,,, | Performed by: INTERNAL MEDICINE

## 2020-03-10 PROCEDURE — 25000003 PHARM REV CODE 250: Performed by: PHYSICIAN ASSISTANT

## 2020-03-10 PROCEDURE — 80069 RENAL FUNCTION PANEL: CPT

## 2020-03-10 PROCEDURE — 25000003 PHARM REV CODE 250: Performed by: INTERNAL MEDICINE

## 2020-03-10 PROCEDURE — 99232 PR SUBSEQUENT HOSPITAL CARE,LEVL II: ICD-10-PCS | Mod: ,,, | Performed by: INTERNAL MEDICINE

## 2020-03-10 PROCEDURE — 63600175 PHARM REV CODE 636 W HCPCS: Performed by: INTERNAL MEDICINE

## 2020-03-10 PROCEDURE — 99231 PR SUBSEQUENT HOSPITAL CARE,LEVL I: ICD-10-PCS | Mod: GC,,, | Performed by: INTERNAL MEDICINE

## 2020-03-10 PROCEDURE — 20600001 HC STEP DOWN PRIVATE ROOM

## 2020-03-10 PROCEDURE — 85025 COMPLETE CBC W/AUTO DIFF WBC: CPT

## 2020-03-10 PROCEDURE — 36415 COLL VENOUS BLD VENIPUNCTURE: CPT

## 2020-03-10 PROCEDURE — 99231 SBSQ HOSP IP/OBS SF/LOW 25: CPT | Mod: GC,,, | Performed by: INTERNAL MEDICINE

## 2020-03-10 PROCEDURE — 83735 ASSAY OF MAGNESIUM: CPT

## 2020-03-10 PROCEDURE — 97110 THERAPEUTIC EXERCISES: CPT

## 2020-03-10 RX ORDER — LISINOPRIL 5 MG/1
10 TABLET ORAL DAILY
Status: DISCONTINUED | OUTPATIENT
Start: 2020-03-11 | End: 2020-03-11 | Stop reason: HOSPADM

## 2020-03-10 RX ORDER — FUROSEMIDE 40 MG/1
40 TABLET ORAL DAILY
Status: DISCONTINUED | OUTPATIENT
Start: 2020-03-11 | End: 2020-03-11 | Stop reason: HOSPADM

## 2020-03-10 RX ORDER — LISINOPRIL 5 MG/1
5 TABLET ORAL ONCE
Status: COMPLETED | OUTPATIENT
Start: 2020-03-10 | End: 2020-03-10

## 2020-03-10 RX ORDER — FUROSEMIDE 40 MG/1
40 TABLET ORAL DAILY
Qty: 30 TABLET | Refills: 11 | Status: ON HOLD | OUTPATIENT
Start: 2020-03-11 | End: 2020-12-07 | Stop reason: HOSPADM

## 2020-03-10 RX ORDER — METOPROLOL SUCCINATE 25 MG/1
25 TABLET, EXTENDED RELEASE ORAL DAILY
Qty: 30 TABLET | Refills: 5 | Status: ON HOLD | OUTPATIENT
Start: 2020-03-10 | End: 2020-12-07 | Stop reason: HOSPADM

## 2020-03-10 RX ORDER — LISINOPRIL 10 MG/1
10 TABLET ORAL DAILY
Qty: 30 TABLET | Refills: 3 | Status: SHIPPED | OUTPATIENT
Start: 2020-03-10

## 2020-03-10 RX ORDER — ATORVASTATIN CALCIUM 80 MG/1
80 TABLET, FILM COATED ORAL NIGHTLY
Qty: 30 TABLET | Refills: 5 | Status: SHIPPED | OUTPATIENT
Start: 2020-03-10

## 2020-03-10 RX ORDER — FERROUS SULFATE 325(65) MG
325 TABLET, DELAYED RELEASE (ENTERIC COATED) ORAL DAILY
Qty: 30 TABLET | Refills: 1 | Status: SHIPPED | OUTPATIENT
Start: 2020-03-11

## 2020-03-10 RX ADMIN — ATORVASTATIN CALCIUM 80 MG: 20 TABLET, FILM COATED ORAL at 10:03

## 2020-03-10 RX ADMIN — FUROSEMIDE 40 MG: 10 INJECTION, SOLUTION INTRAMUSCULAR; INTRAVENOUS at 10:03

## 2020-03-10 RX ADMIN — METOPROLOL TARTRATE 25 MG: 25 TABLET ORAL at 10:03

## 2020-03-10 RX ADMIN — LISINOPRIL 5 MG: 5 TABLET ORAL at 10:03

## 2020-03-10 RX ADMIN — METOPROLOL TARTRATE 25 MG: 25 TABLET ORAL at 09:03

## 2020-03-10 RX ADMIN — AMLODIPINE BESYLATE 2.5 MG: 2.5 TABLET ORAL at 10:03

## 2020-03-10 RX ADMIN — FAMOTIDINE 20 MG: 20 TABLET ORAL at 10:03

## 2020-03-10 RX ADMIN — ASPIRIN 81 MG: 81 TABLET, COATED ORAL at 10:03

## 2020-03-10 RX ADMIN — FERROUS SULFATE TAB EC 325 MG (65 MG FE EQUIVALENT) 325 MG: 325 (65 FE) TABLET DELAYED RESPONSE at 10:03

## 2020-03-10 RX ADMIN — LISINOPRIL 5 MG: 5 TABLET ORAL at 04:03

## 2020-03-10 NOTE — PLAN OF CARE
CM spoke with patient. Patient would not give her daughters name or number to CM. The  only numbers listed on the face sheet are for Norma Jag [phone] and Hayden Jag [phone] grandkids which patient gave CM permission to contact. Patient is still adamant refusing  home health or PT/OT services upon discharge. Patient stated she wants to go home and care for herself.

## 2020-03-10 NOTE — PLAN OF CARE
Spoke with patient regarding home health and OT/PT. Patient is adamantly refusing either service at this time. Attempts to reach Norma Rm or Hayden Rm grandchildren unsuccessful.

## 2020-03-10 NOTE — PROGRESS NOTES
"  [facility] Hospital Medicine  Progress Note    Patient Name: Ciara Lozano  MRN: [id number]  Patient Class: IP- Inpatient   Admission Date: 3/5/2020  Length of Stay: 4 days  Attending Physician: Mechelle Gayle MD  Primary Care Provider: Andreina Gonzales MD    [facility] Medicine Team: [facility] HOSP MED D Mechelle Gayle MD    Subjective:     Principal Problem:Acute on chronic combined systolic and diastolic heart failure    Interval History:   Chief Complaint   Patient presents with    Leg Swelling     Pt reports BLE swelling that started 2 days ago. Pt ambulates with cane at baseline. Pts family states that pt "has been breathing hard after talking for a second or after she walks."     Follow up visit for Acute on chronic combined systolic and diastolic heart failure    History / Events Overnight: No significant events reported by Nursing. Patient with no new complaints reported    Data reviewed 3/10/2020:  H&H low but stable. Albumin is low.    Review of Systems   Constitutional: Negative for fever.   Respiratory: Negative for shortness of breath.      Objective:     Vital Signs (Most Recent):  Temp: 97.8 °F (36.6 °C) (03/10/20 1702)  Pulse: 62 (03/10/20 1702)  Resp: 16 (03/10/20 1702)  BP: 100/69 (03/10/20 1702)  SpO2: 100 % (03/10/20 1702) Vital Signs (24h Range):  Temp:  [97.3 °F (36.3 °C)-98.3 °F (36.8 °C)] 97.8 °F (36.6 °C)  Pulse:  [59-74] 62  Resp:  [16-18] 16  SpO2:  [90 %-100 %] 100 %  BP: ()/(51-70) 100/69     Weight: 74.9 kg (165 lb 2 oz)  Body mass index is 30.2 kg/m².    Intake/Output Summary (Last 24 hours) at 3/10/2020 1709  Last data filed at 3/10/2020 1200  Gross per 24 hour   Intake 720 ml   Output 400 ml   Net 320 ml      Physical Exam   Constitutional: She is sleeping. No distress.   Eyes: Conjunctivae and lids are normal. No scleral icterus.   Cardiovascular: Normal rate, regular rhythm, S1 normal and S2 normal.   Pulmonary/Chest: Effort normal and breath sounds " normal. She has no wheezes. She has no rhonchi.   Abdominal: Soft. Normal appearance and bowel sounds are normal. There is no tenderness.   Musculoskeletal: She exhibits edema.   Neurological: She is not disoriented.   Skin: Skin is warm and dry. No cyanosis. No pallor.   Psychiatric: Cognition and memory are impaired.     Significant Labs:     CBC:   Recent Labs   Lab 03/09/20  0459 03/10/20  0518   WBC 4.86 4.62   HGB 7.2* 7.0*   HCT 25.0* 24.4*    202     CMP:   Recent Labs   Lab 03/09/20  0458 03/10/20  0518    137   K 4.2 4.2    102   CO2 29 29   GLU 73 78   BUN 26* 27*   CREATININE 1.1 1.0   CALCIUM 8.4* 8.7   ALBUMIN 2.5* 2.4*   ANIONGAP 5* 6*   EGFRNONAA 46.5* 52.2*     Magnesium:   Recent Labs   Lab 03/09/20  0458 03/10/20  0518   MG 1.9 1.9     TSH:   Recent Labs   Lab 03/06/20  0356   TSH 0.337*     Significant Imaging:   ECHO 3/6  · Mild left ventricular enlargement.  · Eccentric left ventricular hypertrophy.  · Moderately decreased left ventricular systolic function. The estimated ejection fraction is 30%.  · Local segmental wall motion abnormalities.  · Grade II (moderate) left ventricular diastolic dysfunction consistent with pseudonormalization.  · Severe left atrial enlargement.  · Low normal right ventricular systolic function.  · Mild-to-moderate aortic regurgitation.  · Moderate mitral sclerosis.  · Moderate-to-severe mitral regurgitation.  · Mild tricuspid regurgitation.  · Normal central venous pressure (3 mmHg).  · The estimated PA systolic pressure is 38 mmHg.  · Small posterolateral pericardial effusion.    Assessment/Plan:      Active Diagnoses:    Diagnosis Date Noted POA    PRINCIPAL PROBLEM:  Acute on chronic combined systolic and diastolic heart failure [I50.43] 03/06/2020 Yes    Chronic renal failure, stage 3 (moderate) [N18.3] 03/06/2020 Yes    Anemia [D64.9] 03/06/2020 Yes    Memory deficits [R41.3] 03/06/2020 Yes    Elevated troponin I level [R79.89]  03/06/2020 Yes    Carotid artery disease [I73.9]  Yes    CAD (coronary artery disease) [I25.10] 08/12/2014 Yes    LBBB (left bundle branch block) [I44.7] 07/16/2014 Yes    Hypertension [I10] 11/19/2012 Yes     Chronic    Hyperlipidemia LDL goal < 100 [E78.5] 11/19/2012 Yes     Chronic      Problems Resolved During this Admission:       Overview / ICU Course:    Ciara Lozano is a 84 y.o. female with medical history significant for CAD, HFpEF, HLD, HTN admitted for Acute on chronic systolic & diastolic heart failure.    Inpatient Medications Prescribed for Management of Current Problems:     Scheduled Meds:    aspirin  81 mg Oral Daily    atorvastatin  80 mg Oral Daily    famotidine  20 mg Oral Daily    ferrous sulfate  325 mg Oral Daily    [START ON 3/11/2020] furosemide  40 mg Oral Daily    [START ON 3/11/2020] lisinopriL  10 mg Oral Daily    metoprolol tartrate  25 mg Oral BID    polyethylene glycol  17 g Oral Daily     Continuous Infusions:   As Needed: acetaminophen, albuterol-ipratropium, Dextrose 10% Bolus, Dextrose 10% Bolus, glucagon (human recombinant), glucose, glucose, melatonin, ondansetron, promethazine (PHENERGAN) IVPB, senna-docusate 8.6-50 mg, sodium chloride 0.9%    Assessment and Plan by Problem    Acute on chronic systolic and diastolic heart failure  Hypervolemic on admission exam and imaging and BNP significantly elevated to 2600 on admission  - Last echo 2014 with low-normal EF and diastolic dysfunction  - on HCTZ as OP  - unclear if patient has been compliant with her home meds and even states that the granddaughter she lives with is in the hospital.  - patient is Lasix-naive and was given 80 mg IVP of Lasix in ED.    - Started Lasix 40 mg IVP BID; assess response symptomatically and renal function.    - monitor response with daily weights, strict I/Os, oxygen requirements  - Na restricted/fluid restricted diet 1500 mL  - TTE with EF 30% now; Consulted Cardiology: start GDMT and  titrate as needed. No indication for Plavix. Start lopressor 25 mg PO BID (can transition to Toprol XL at discharge). Low dose ACE/ARB - home  lisinopril resumed at lower dose. Continue Lasix IV. Cardiac PET stress when more stable.  - Decreased amlodipine in order to start BB and ACEi  - per Cardiology: Lopressor 25 mg PO BID (transition to Toprol XL at discharge). Low dose ACE/ARB. Continue Lasix IV.   - Final Cardiology recommendations:  Lopressor 25 mg PO BID (transition to Toprol XL at discharge), discontinue amlodipine and increase lisinopril, change IV Lasix to PO Lasix 40 mg daily; discontinue home HCTZ. Compression stockings for LE edema.  Cardiac PET stress, done as outpatient.    Anemia  Hg 7.7, down from baseline of 9 -11 last year  - iron studies consistent with iron deficiency and chronic disease, TSH slightly low, FT4 normal.  B12 and folate normal.     CAD (coronary artery disease)  Carotid artery disease  Hyperlipidemia LDL goal < 100  - continue with DAPT, statin  - last lipid studies 5/2019 benign     Hypertension  Controlled  - hold HCTZ   - held lisinopril 40 mg PO daily as she is normotensive and unclear if compliant with home meds  - continued amlodipine 10 mg PO daily  - decreased amlodipine as resumed lower dose lisinopril and started BB.     Memory deficits  Likely has unspecified vs vascular dementia  - PT/OT consulted for debility; plan for home health  - delirium precautions in place, fall precautions     Chronic renal failure, stage 3 (moderate)  Chronic and stable     LBBB (left bundle branch block)  Chronic and stable     Elevated troponin I level  Likely type II NSTEMI from demand/CHF exacerbation.  Concern for worsening EF; consulted Cardiology  Plan for PET Stress as OP    Discharge plan and follow up  [facility]  ADRYAN 3/11/2020  Previous admission:  3/31/15  Goals of Care:  General Prognosis: good  Goals: Curative  Comfort Only: No  Hospice: No  Code Status: Full  Code    Diet: Diet [facility]; Fluid - 1500mL  GI Prophylaxis: Indicated due to multiple minor risk factors  Significant LDAs:   IV Access Type: Peripheral  Urinary Catheter Indication if present: Patient Does Not Have Urinary Catheter  Other Lines/Tubes/Drains:    VTE Risk Mitigation (From admission, onward)         Ordered     IP VTE HIGH RISK PATIENT  Once      03/06/20 0211     Place ZORAIDA hose  Until discontinued      03/06/20 0211     Place sequential compression device  Until discontinued      03/06/20 0211                 Mechelle Gayle MD  Department of Hospital Medicine   [facility]

## 2020-03-10 NOTE — PT/OT/SLP PROGRESS
Physical Therapy   Progress Note    Patient Name:  Ciara Lozano  MRN: [id number]  Recent Surgery: * No surgery found *      Recommendations:     Discharge Recommendations: Home Health PT with caregiver supervision  Equipment recommendations: rolling walker, bedside commode, tub bench  Barriers to discharge: Increased level of skilled assistance required and Fall risk     Assessment:     Ciara Lozano is a 84 y.o. female [facility] 3/5/2020 with medical diagnosis of Acute on chronic combined systolic and diastolic heart failure. Pt presents with weakness, impaired balance, decreased endurance, impaired cardiopulmonary response to activity, decreased safety awareness, impaired cognition, altered gait mechanics, impaired functional mobility  and gait instability.Upon arrival pt presented as very lethargic requiring max encouragment throughout session to paticipate in activities. Throughout session demonstrated self limiting behaviors, she was unwilling to ambulate further than distances within the room.  Ms Lozano performed sit to stand x 1 and ambulated ~30 ft with a range of contact guard to stand by assistance this session. Cueing and assistance is required throughout session to maintain pt safety due to cognitive impairments.  Ciara Lozano would benefit from continued acute PT intervention to address listed functional deficits, provide patient/caregiver education, reduce fall risk, and maximize (I) and safety with functional mobility. Once medically stable, recommending pt discharge home health PT, with caregiver supervision.     Rehab Prognosis: Good; patient would benefit from acute skilled PT services to address these deficits and reach maximum level of function.      Plan:     During this hospitalization, patient to be seen 3 x/week to address the identified rehab impairments via gait training, therapeutic activities, therapeutic exercises, neuromuscular re-education and progress towards stated goals.  "    Plan of Care Expires:  20  Plan of Care reviewed with: patient    This plan of care has been discussed with the patient/caregiver, who was included in its development and is in agreement with the identified goals and treatment plan.     Subjective     Communicated with RN prior to session.  Patient agreeable to participate. No family/cargiver support present at bedside upon PT entrance into room.    Patient comments/goals: "I can do it by myself, I don't need any help." "anytime I try to move ya'll get too close."    Pain/Comfort:  · Location: n/a  · Pain Ratin/10   · Pain Rating Post-Intervention: 0/10   · RN notified, pt assisted with repositioning for comfort     Patients cultural, spiritual, Gnosticist conflicts given the current situation: No cultural, spiritual, or educational needs identified.    Objective:     Patient found up in chair with: telemetry    General Precautions: Standard, fall   Orthopedic Precautions:N/A   Braces: N/A   Body mass index is 30.2 kg/m².  Oxygen Device: room air    Cognition:   Pt is Oriented to person and time, and initially lethargic able to arouse with max stimuli during session.    Functional Mobility:    Transfers:   · Sit <> Stand Transfer: Contact Guard Assistance from chair with RW  · Cueing provided for hand placement                  Gait:  · Distance: ~30 ft + 1 turn  · Assistance level: Contact Guard Assistance and Stand by Assistance   · Assistive Device: rolling walker  · Gait Assessment: decreased step length , decreased gait speed, unsteady gait  and decreased heel strike   · Pt requires cueing for AD management, but is resistant to therapist providing assitance    Balance:  · Static Stand: Contact-Guard Assist with Rolling walker   · Pt demonstrated forward flexed posture in standing    Outcome Measure: AM-PAC 6 CLICK MOBILITY  Total Score:17     Patient/Caregiver Education and Therapeutic Activities/Exercises     Session activities as detailed above " completed in order to improve pt's independence with functional mobility. Session activites limited 2/2 pt fatigue, and fluctuations in cognition.      Therapist educated pt/caregiver regarding:    PT POC and goals for therapy    Safety with mobility and fall risk    Instruction on use of call button and importance of calling nursing staff for assistance with mobility     Patient/caregiver able to verbalize understanding; will follow-up with pt/caregiver during current admit for additional questions/concerns within scope of practice.     White board updated.     Patient left up in chair with all lines intact, call button in reach, chair alarm on and RN notified.    Goals:     Multidisciplinary Problems     Physical Therapy Goals        Problem: Physical Therapy Goal    Goal Priority Disciplines Outcome Goal Variances Interventions   Physical Therapy Goal     PT, PT/OT Ongoing, Progressing     Description:  Goals to be met by: 20     Patient will increase functional independence with mobility by performin. Supine to sit with Stand-by Assistance  2. Sit to stand transfer with Stand-by Assistance  3. Bed to chair transfer with Stand-by Assistance using Rolling Walker or LRAD  4. Gait  x 100 feet with Contact Guard Assistance using Rolling Walker. Or LRAD  5. Ascend/descend 3 stair with single Handrails Minimal Assistance .   6. Lower extremity exercise program x15 reps per handout, with assistance as needed                      Time Tracking:       PT Received On: 03/10/20  PT Start Time: 1005     PT Stop Time: 1022  PT Total Time (min): 17 min     Billable Minutes: Therapeutic Exercise 17 minutes    ABIODUN Burrows  03/10/2020

## 2020-03-10 NOTE — ASSESSMENT & PLAN NOTE
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 Clinically patient is still volume overloaded (+JVD and HJR). Would not use peripheral edema as marker given albumin 2.3. Significant anemia of 6.8 (previously 11.2) and c/w LINDA (no known blood loss per patient). From cardiac standpoint, would continue to diuresis and obtain euvolemia. Patient can undergo stress test to evaluate for high risk features requiring LHC in future. Would avoid invasive procedures with significant new anemia. Will need to start GDMT and titrate as needed. No indication for plavix, and in a patient with significant new anemia would recommend d/c.    Recommendations:  - Start lopressor 25 mg PO BID (can transition to Toprol XL at discharge)  - Recommend discontinuing amlodipine and increasing lisinopril instead   - Switch from IV to PO lasix 40 mg daily; stop home HCTZ  - Compression stockings for b/l LE edema  - Cardiac PET stress, can be done outpatient

## 2020-03-10 NOTE — SUBJECTIVE & OBJECTIVE
Interval History: NAEON. Mentally more alert and oriented when seen this AM. No CP, SOB, palpitations. No JVD or hepatojugular reflux, still with b/l LE pitting edema. Otherwise clinically euvolemic.     Past Medical History:   Diagnosis Date    CAD (coronary artery disease) 2014    Carotid artery disease     Congestive heart failure, unspecified     Encounter for blood transfusion     Hemangioma 2013    Hyperlipidemia     Hypertension     Peripheral vascular disease     Syncope and collapse        Past Surgical History:   Procedure Laterality Date    CARDIAC CATHETERIZATION      cataracts      CORONARY ANGIOPLASTY         Review of patient's allergies indicates:   Allergen Reactions    Pollen extracts        No current facility-administered medications on file prior to encounter.      Current Outpatient Medications on File Prior to Encounter   Medication Sig    amLODIPine (NORVASC) 10 MG tablet TAKE 1 TABLET(10 MG) BY MOUTH EVERY DAY    aspirin (ECOTRIN) 81 MG EC tablet Take 81 mg by mouth once daily.    atorvastatin (LIPITOR) 80 MG tablet 1 po daily    clopidogrel (PLAVIX) 75 mg tablet 1 po daily    hydroCHLOROthiazide (HYDRODIURIL) 25 MG tablet TAKE 1 TABLET(25 MG) BY MOUTH EVERY DAY    lisinopril (PRINIVIL,ZESTRIL) 40 MG tablet 1 po daily    nitroGLYCERIN (NITROSTAT) 0.4 MG SL tablet Place 1 tablet (0.4 mg total) under the tongue every 5 (five) minutes as needed for Chest pain (no more tiana 3 doses).     Family History     Problem Relation (Age of Onset)    Anuerysm Sister    Hypertension Mother, Father    No Known Problems Maternal Grandmother, Maternal Grandfather, Paternal Grandmother, Paternal Grandfather, Brother, Sister, Maternal Aunt, Maternal Uncle, Paternal Aunt, Paternal Uncle        Tobacco Use    Smoking status: Former Smoker     Packs/day: 0.25     Years: 20.00     Pack years: 5.00     Last attempt to quit: 1995     Years since quittin.3   Substance and Sexual  Activity    Alcohol use: No     Alcohol/week: 0.0 standard drinks     Comment: h/o regular ETOH use, now drinks only on holidays    Drug use: No    Sexual activity: Not Currently     Review of Systems   Constitution: Negative for chills and fever.   Cardiovascular: Positive for dyspnea on exertion. Negative for chest pain, claudication, irregular heartbeat, leg swelling, near-syncope, orthopnea, palpitations, paroxysmal nocturnal dyspnea and syncope.   Respiratory: Positive for shortness of breath. Negative for cough.    Musculoskeletal: Negative for joint pain and joint swelling.   Gastrointestinal: Negative for abdominal pain, nausea and vomiting.   Neurological: Negative for focal weakness and headaches.   All other systems reviewed and are negative.    Objective:     Vital Signs (Most Recent):  Temp: 97.9 °F (36.6 °C) (03/10/20 0744)  Pulse: 73 (03/10/20 0744)  Resp: 16 (03/10/20 0744)  BP: 126/70 (03/10/20 0744)  SpO2: (!) 90 % (03/10/20 0744) Vital Signs (24h Range):  Temp:  [97.3 °F (36.3 °C)-98.5 °F (36.9 °C)] 97.9 °F (36.6 °C)  Pulse:  [59-73] 73  Resp:  [16-18] 16  SpO2:  [90 %-98 %] 90 %  BP: (105-126)/(49-70) 126/70     Weight: 74.9 kg (165 lb 2 oz)  Body mass index is 30.2 kg/m².    SpO2: (!) 90 %  O2 Device (Oxygen Therapy): nasal cannula      Intake/Output Summary (Last 24 hours) at 3/10/2020 1121  Last data filed at 3/9/2020 1744  Gross per 24 hour   Intake 480 ml   Output 500 ml   Net -20 ml       Lines/Drains/Airways     Peripheral Intravenous Line                 Peripheral IV - Single Lumen 03/09/20 1842 22 G Left;Posterior Hand less than 1 day                Physical Exam   Constitutional: She appears well-developed and well-nourished. No distress.   HENT:   Head: Normocephalic and atraumatic.   Eyes: EOM are normal. Right eye exhibits no discharge. Left eye exhibits no discharge. No scleral icterus.   Neck: Normal range of motion. Neck supple. No JVD present.   Cardiovascular: Normal rate,  regular rhythm, S1 normal, S2 normal, intact distal pulses and normal pulses.  No extrasystoles are present. Exam reveals no gallop, no S3, no S4, no distant heart sounds, no friction rub and no opening snap.   No murmur heard.  Pulmonary/Chest: Effort normal. No respiratory distress. She has no wheezes. She has rales.   Abdominal: Soft. Bowel sounds are normal. She exhibits no distension. There is no tenderness.   Musculoskeletal: Normal range of motion. She exhibits edema. She exhibits no deformity.   Neurological: She is alert.   Skin: Skin is warm and dry. No rash noted. She is not diaphoretic. No erythema.   Nursing note and vitals reviewed.      Significant Labs: All pertinent lab results from the last 24 hours have been reviewed.    Significant Imaging: Reviewed

## 2020-03-10 NOTE — PLAN OF CARE
Problem: Physical Therapy Goal  Goal: Physical Therapy Goal  Description  Goals to be met by: 20     Patient will increase functional independence with mobility by performin. Supine to sit with Stand-by Assistance  2. Sit to stand transfer with Stand-by Assistance  3. Bed to chair transfer with Stand-by Assistance using Rolling Walker or LRAD  4. Gait  x 100 feet with Contact Guard Assistance using Rolling Walker. Or LRAD  5. Ascend/descend 3 stair with single Handrails Minimal Assistance .   6. Lower extremity exercise program x15 reps per handout, with assistance as needed     Outcome: Ongoing, Progressing    Goals remain appropriate. Continue current POC, progressing as tolerated.     ABIODUN Burrows  3/10/2020

## 2020-03-10 NOTE — PROGRESS NOTES
[facility]  Cardiology  Progress Note    Patient Name: Ciara Lozano  MRN: [id number]  Admission Date: 3/5/2020  Hospital Length of Stay: 4 days  Code Status: Full Code   Attending Physician: Mechelle Gayle MD   Primary Care Physician: Andreina Gonzales MD  Expected Discharge Date: 3/11/2020  Principal Problem:Acute on chronic combined systolic and diastolic heart failure    Subjective:     Hospital Course:   No notes on file    Interval History: NAEON. Mentally more alert and oriented when seen this AM. No CP, SOB, palpitations. No JVD or hepatojugular reflux, still with b/l LE pitting edema. Otherwise clinically euvolemic.     Past Medical History:   Diagnosis Date    CAD (coronary artery disease) 8/12/2014    Carotid artery disease     Congestive heart failure, unspecified     Encounter for blood transfusion     Hemangioma 4/2/2013    Hyperlipidemia     Hypertension     Peripheral vascular disease     Syncope and collapse        Past Surgical History:   Procedure Laterality Date    CARDIAC CATHETERIZATION      cataracts      CORONARY ANGIOPLASTY         Review of patient's allergies indicates:   Allergen Reactions    Pollen extracts        No current facility-administered medications on file prior to encounter.      Current Outpatient Medications on File Prior to Encounter   Medication Sig    amLODIPine (NORVASC) 10 MG tablet TAKE 1 TABLET(10 MG) BY MOUTH EVERY DAY    aspirin (ECOTRIN) 81 MG EC tablet Take 81 mg by mouth once daily.    atorvastatin (LIPITOR) 80 MG tablet 1 po daily    clopidogrel (PLAVIX) 75 mg tablet 1 po daily    hydroCHLOROthiazide (HYDRODIURIL) 25 MG tablet TAKE 1 TABLET(25 MG) BY MOUTH EVERY DAY    lisinopril (PRINIVIL,ZESTRIL) 40 MG tablet 1 po daily    nitroGLYCERIN (NITROSTAT) 0.4 MG SL tablet Place 1 tablet (0.4 mg total) under the tongue every 5 (five) minutes as needed for Chest pain (no more tiana 3 doses).     Family History     Problem Relation  (Age of Onset)    Anuerysm Sister    Hypertension Mother, Father    No Known Problems Maternal Grandmother, Maternal Grandfather, Paternal Grandmother, Paternal Grandfather, Brother, Sister, Maternal Aunt, Maternal Uncle, Paternal Aunt, Paternal Uncle        Tobacco Use    Smoking status: Former Smoker     Packs/day: 0.25     Years: 20.00     Pack years: 5.00     Last attempt to quit: 1995     Years since quittin.3   Substance and Sexual Activity    Alcohol use: No     Alcohol/week: 0.0 standard drinks     Comment: h/o regular ETOH use, now drinks only on holidays    Drug use: No    Sexual activity: Not Currently     Review of Systems   Constitution: Negative for chills and fever.   Cardiovascular: Positive for dyspnea on exertion. Negative for chest pain, claudication, irregular heartbeat, leg swelling, near-syncope, orthopnea, palpitations, paroxysmal nocturnal dyspnea and syncope.   Respiratory: Positive for shortness of breath. Negative for cough.    Musculoskeletal: Negative for joint pain and joint swelling.   Gastrointestinal: Negative for abdominal pain, nausea and vomiting.   Neurological: Negative for focal weakness and headaches.   All other systems reviewed and are negative.    Objective:     Vital Signs (Most Recent):  Temp: 97.9 °F (36.6 °C) (03/10/20 0744)  Pulse: 73 (03/10/20 0744)  Resp: 16 (03/10/20 0744)  BP: 126/70 (03/10/20 0744)  SpO2: (!) 90 % (03/10/20 0744) Vital Signs (24h Range):  Temp:  [97.3 °F (36.3 °C)-98.5 °F (36.9 °C)] 97.9 °F (36.6 °C)  Pulse:  [59-73] 73  Resp:  [16-18] 16  SpO2:  [90 %-98 %] 90 %  BP: (105-126)/(49-70) 126/70     Weight: 74.9 kg (165 lb 2 oz)  Body mass index is 30.2 kg/m².    SpO2: (!) 90 %  O2 Device (Oxygen Therapy): nasal cannula      Intake/Output Summary (Last 24 hours) at 3/10/2020 1121  Last data filed at 3/9/2020 1744  Gross per 24 hour   Intake 480 ml   Output 500 ml   Net -20 ml       Lines/Drains/Airways     Peripheral Intravenous Line                  Peripheral IV - Single Lumen 03/09/20 1842 22 G Left;Posterior Hand less than 1 day                Physical Exam   Constitutional: She appears well-developed and well-nourished. No distress.   HENT:   Head: Normocephalic and atraumatic.   Eyes: EOM are normal. Right eye exhibits no discharge. Left eye exhibits no discharge. No scleral icterus.   Neck: Normal range of motion. Neck supple. No JVD present.   Cardiovascular: Normal rate, regular rhythm, S1 normal, S2 normal, intact distal pulses and normal pulses.  No extrasystoles are present. Exam reveals no gallop, no S3, no S4, no distant heart sounds, no friction rub and no opening snap.   No murmur heard.  Pulmonary/Chest: Effort normal. No respiratory distress. She has no wheezes. She has rales.   Abdominal: Soft. Bowel sounds are normal. She exhibits no distension. There is no tenderness.   Musculoskeletal: Normal range of motion. She exhibits edema. She exhibits no deformity.   Neurological: She is alert.   Skin: Skin is warm and dry. No rash noted. She is not diaphoretic. No erythema.   Nursing note and vitals reviewed.      Significant Labs: All pertinent lab results from the last 24 hours have been reviewed.    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 Clinically patient is still volume overloaded (+JVD and HJR). Would not use peripheral edema as marker given albumin 2.3. Significant anemia of 6.8 (previously 11.2) and c/w LINDA (no known blood loss per patient). From cardiac standpoint, would continue to diuresis and obtain euvolemia. Patient can undergo stress test to evaluate for high risk features requiring LHC in future. Would avoid invasive procedures with significant new anemia. Will need to start GDMT and titrate as  needed. No indication for plavix, and in a patient with significant new anemia would recommend d/c.    Recommendations:  - Start lopressor 25 mg PO BID (can transition to Toprol XL at discharge)  - Recommend discontinuing amlodipine and increasing lisinopril instead   - Switch from IV to PO lasix 40 mg daily; stop home HCTZ  - Compression stockings for b/l LE edema  - Cardiac PET stress, can be done outpatient      Will sign off at this time. Please re-consult if further questions arise. Thank you    VTE Risk Mitigation (From admission, onward)         Ordered     IP VTE HIGH RISK PATIENT  Once      03/06/20 0211     Place ZORAIDA hose  Until discontinued      03/06/20 0211     Place sequential compression device  Until discontinued      03/06/20 0211                Young Johns MD  Cardiology  [facility]

## 2020-03-11 VITALS
TEMPERATURE: 98 F | SYSTOLIC BLOOD PRESSURE: 117 MMHG | HEART RATE: 57 BPM | RESPIRATION RATE: 18 BRPM | BODY MASS INDEX: 30.14 KG/M2 | HEIGHT: 62 IN | OXYGEN SATURATION: 98 % | WEIGHT: 163.81 LBS | DIASTOLIC BLOOD PRESSURE: 58 MMHG

## 2020-03-11 LAB
ALBUMIN SERPL BCP-MCNC: 2.4 G/DL (ref 3.5–5.2)
ANION GAP SERPL CALC-SCNC: 8 MMOL/L (ref 8–16)
BASOPHILS # BLD AUTO: 0.02 K/UL (ref 0–0.2)
BASOPHILS NFR BLD: 0.5 % (ref 0–1.9)
BUN SERPL-MCNC: 28 MG/DL (ref 8–23)
CALCIUM SERPL-MCNC: 8.4 MG/DL (ref 8.7–10.5)
CHLORIDE SERPL-SCNC: 104 MMOL/L (ref 95–110)
CO2 SERPL-SCNC: 28 MMOL/L (ref 23–29)
CREAT SERPL-MCNC: 1.1 MG/DL (ref 0.5–1.4)
DIFFERENTIAL METHOD: ABNORMAL
EOSINOPHIL # BLD AUTO: 0.1 K/UL (ref 0–0.5)
EOSINOPHIL NFR BLD: 1.6 % (ref 0–8)
ERYTHROCYTE [DISTWIDTH] IN BLOOD BY AUTOMATED COUNT: 15.8 % (ref 11.5–14.5)
EST. GFR  (AFRICAN AMERICAN): 53.7 ML/MIN/1.73 M^2
EST. GFR  (NON AFRICAN AMERICAN): 46.5 ML/MIN/1.73 M^2
GLUCOSE SERPL-MCNC: 161 MG/DL (ref 70–110)
HCT VFR BLD AUTO: 25.1 % (ref 37–48.5)
HGB BLD-MCNC: 7.1 G/DL (ref 12–16)
IMM GRANULOCYTES # BLD AUTO: 0.02 K/UL (ref 0–0.04)
IMM GRANULOCYTES NFR BLD AUTO: 0.5 % (ref 0–0.5)
LYMPHOCYTES # BLD AUTO: 0.9 K/UL (ref 1–4.8)
LYMPHOCYTES NFR BLD: 19.9 % (ref 18–48)
MAGNESIUM SERPL-MCNC: 2 MG/DL (ref 1.6–2.6)
MCH RBC QN AUTO: 24.1 PG (ref 27–31)
MCHC RBC AUTO-ENTMCNC: 28.3 G/DL (ref 32–36)
MCV RBC AUTO: 85 FL (ref 82–98)
MONOCYTES # BLD AUTO: 0.5 K/UL (ref 0.3–1)
MONOCYTES NFR BLD: 10.6 % (ref 4–15)
NEUTROPHILS # BLD AUTO: 2.9 K/UL (ref 1.8–7.7)
NEUTROPHILS NFR BLD: 66.9 % (ref 38–73)
NRBC BLD-RTO: 0 /100 WBC
PHOSPHATE SERPL-MCNC: 2.4 MG/DL (ref 2.7–4.5)
PLATELET # BLD AUTO: 198 K/UL (ref 150–350)
PMV BLD AUTO: 11.6 FL (ref 9.2–12.9)
POTASSIUM SERPL-SCNC: 3.7 MMOL/L (ref 3.5–5.1)
RBC # BLD AUTO: 2.95 M/UL (ref 4–5.4)
SODIUM SERPL-SCNC: 140 MMOL/L (ref 136–145)
WBC # BLD AUTO: 4.32 K/UL (ref 3.9–12.7)

## 2020-03-11 PROCEDURE — 25000003 PHARM REV CODE 250: Performed by: INTERNAL MEDICINE

## 2020-03-11 PROCEDURE — 85025 COMPLETE CBC W/AUTO DIFF WBC: CPT

## 2020-03-11 PROCEDURE — 97530 THERAPEUTIC ACTIVITIES: CPT

## 2020-03-11 PROCEDURE — 25000003 PHARM REV CODE 250: Performed by: PHYSICIAN ASSISTANT

## 2020-03-11 PROCEDURE — 99239 PR HOSPITAL DISCHARGE DAY,>30 MIN: ICD-10-PCS | Mod: ,,, | Performed by: INTERNAL MEDICINE

## 2020-03-11 PROCEDURE — 80069 RENAL FUNCTION PANEL: CPT

## 2020-03-11 PROCEDURE — 36415 COLL VENOUS BLD VENIPUNCTURE: CPT

## 2020-03-11 PROCEDURE — 99239 HOSP IP/OBS DSCHRG MGMT >30: CPT | Mod: ,,, | Performed by: INTERNAL MEDICINE

## 2020-03-11 PROCEDURE — 83735 ASSAY OF MAGNESIUM: CPT

## 2020-03-11 RX ADMIN — METOPROLOL TARTRATE 25 MG: 25 TABLET ORAL at 09:03

## 2020-03-11 RX ADMIN — ATORVASTATIN CALCIUM 80 MG: 20 TABLET, FILM COATED ORAL at 09:03

## 2020-03-11 RX ADMIN — ASPIRIN 81 MG: 81 TABLET, COATED ORAL at 09:03

## 2020-03-11 RX ADMIN — FUROSEMIDE 40 MG: 40 TABLET ORAL at 09:03

## 2020-03-11 RX ADMIN — FAMOTIDINE 20 MG: 20 TABLET ORAL at 09:03

## 2020-03-11 RX ADMIN — FERROUS SULFATE TAB EC 325 MG (65 MG FE EQUIVALENT) 325 MG: 325 (65 FE) TABLET DELAYED RESPONSE at 09:03

## 2020-03-11 RX ADMIN — POLYETHYLENE GLYCOL 3350 17 G: 17 POWDER, FOR SOLUTION ORAL at 09:03

## 2020-03-11 RX ADMIN — LISINOPRIL 10 MG: 5 TABLET ORAL at 09:03

## 2020-03-11 NOTE — NURSING
PIV and telemetry removed. VSS. Pt discharging with HH, refused DME. Discharge instructions reviewed with pt and grandson. Verbalize understanding.

## 2020-03-11 NOTE — PLAN OF CARE
Plan of care reviewed with patient. PICC placed today for IV Vancomycin therapy. Switched to po steroids. No other significant changes. Safety checks done. Will continue to monitor

## 2020-03-11 NOTE — PLAN OF CARE
Patient discharging home today with home health. Family to transport home. Patient refused all DME when it was delivered to her room.       03/11/20 1557   Final Note   Assessment Type Final Discharge Note   Anticipated Discharge Disposition Home-Health   What phone number can be called within the next 1-3 days to see how you are doing after discharge? [phone]   Hospital Follow Up  Appt(s) scheduled? Yes   Discharge plans and expectations educations in teach back method with documentation complete? Yes   Right Care Referral Info   Post Acute Recommendation Home-care   Post-Acute Status   Post-Acute Authorization Home Health/Hospice

## 2020-03-11 NOTE — PLAN OF CARE
[facility]    HOME HEALTH ORDERS  FACE TO FACE ENCOUNTER    Patient Name: Ciara Lozano  YOB: 1935    PCP: Andreina Gonzales MD   PCP Address: [address] [address]  PCP Phone Number: [phone]  PCP Fax: [phone]    Encounter Date: 03/10/2020    Admit to Home Health    Diagnoses:  Active Hospital Problems    Diagnosis  POA    *Chronic combined systolic and diastolic heart failure [I50.42]  Yes    Chronic renal failure, stage 3 (moderate) [N18.3]  Yes    Anemia [D64.9]  Yes    Memory deficits [R41.3]  Yes    Carotid artery disease [I73.9]  Yes    CAD (coronary artery disease) [I25.10]  Yes    LBBB (left bundle branch block) [I44.7]  Yes    Hypertension [I10]  Yes     Chronic    Hyperlipidemia LDL goal < 100 [E78.5]  Yes     Chronic     Dx updated per 2019 IMO Load        Resolved Hospital Problems    Diagnosis Date Resolved POA    Elevated troponin I level [R79.89] 03/10/2020 Yes       Follow-up Information     Andreina Gonzales MD. Schedule an appointment as soon as possible for a visit in 1 week.    Specialty:  Internal Medicine  Why:  For discharge from hospital follow up  [facility] information:  [facility] [facility] [address]  [phone]             [facility] CARDIOLOGY. Schedule an appointment as soon as possible for a visit in 1 week.    Specialty:  Cardiology  Why:  To establish care, To schedule PET Stress Test  Contact information:  [address]  [facility] [address]  [phone]               I have seen and examined this patient face to face today. My clinical findings that support the need for the home health skilled services and home bound status are the following:  Patient with medication mismanagement issues requiring home bound status as evidenced by  Unstable vital signs (blood pressure, heart rate), Poor understanding of medication regimen/dosage and Poor adherence to medication regimen/dosage.  Medical  restrictions requiring assistance of another human to leave home due to  Fluid/volume overload and Unstable ambulation.    Allergies:  Review of patient's allergies indicates:   Allergen Reactions    Pollen extracts        Diet: cardiac diet, fluid restriction: 1500 mL and 2 gram sodium diet    Activities: activity as tolerated    Nursing:   SN to complete comprehensive assessment including routine vital signs. Instruct on disease process and s/s of complications to report to MD. Review/verify medication list sent home with the patient at time of discharge  and instruct patient/caregiver as needed. Frequency may be adjusted depending on start of care date.    Notify MD if SBP > 160 or < 90; DBP > 90 or < 50; HR > 120 or < 50; Temp > 101    CONSULTS:    Physical Therapy to evaluate and treat. Evaluate for home safety and equipment needs; Establish/upgrade home exercise program. Perform / instruct on therapeutic exercises, gait training, transfer training, and Range of Motion.  Occupational Therapy to evaluate and treat. Evaluate home environment for safety and equipment needs. Perform/Instruct on transfers, ADL training, ROM, and therapeutic exercises.   to evaluate for community resources/long-range planning.    MISCELLANEOUS CARE:  Heart Failure:    SN to instruct on the following:    Instruct on the definition of CHF.   Instruct on the signs/sympoms of CHF to be reported.   Instruct on and monitor daily weights.   Instruct on factors that cause exacerbation.   Instruct on action, dose, schedule, and side effects of medications.   Instruct on diet as prescribed.   Instruct on activity allowed.   Instruct on life-style modifications for life long management of CHF   SN to assess compliance with daily weights, diet, medications, fluid retention,    safety precautions, activities permitted and life-style modifications.   Additional 1-2 SN visits per week as needed for signs and symptoms     of CHF  exacerbation.    Home Health with AIM for CHF    Medications: Review discharge medications with patient and family and provide education.      Current Discharge Medication List      START taking these medications    Details   ferrous sulfate 325 (65 FE) MG EC tablet Take 1 tablet (325 mg total) by mouth once daily.  Qty: 30 tablet, Refills: 1      furosemide (LASIX) 40 MG tablet Take 1 tablet (40 mg total) by mouth once daily.  Qty: 30 tablet, Refills: 11      metoprolol succinate (TOPROL-XL) 25 MG 24 hr tablet Take 1 tablet (25 mg total) by mouth once daily.  Qty: 30 tablet, Refills: 5         CONTINUE these medications which have CHANGED    Details   atorvastatin (LIPITOR) 80 MG tablet Take 1 tablet (80 mg total) by mouth every evening.  Qty: 30 tablet, Refills: 5    Associated Diagnoses: Coronary artery disease, angina presence unspecified, unspecified vessel or lesion type, unspecified whether native or transplanted heart; Carotid artery disease, unspecified laterality; Essential hypertension      lisinopriL 10 MG tablet Take 1 tablet (10 mg total) by mouth once daily.  Qty: 30 tablet, Refills: 3    Associated Diagnoses: Essential hypertension         CONTINUE these medications which have NOT CHANGED    Details   aspirin (ECOTRIN) 81 MG EC tablet Take 81 mg by mouth once daily.      nitroGLYCERIN (NITROSTAT) 0.4 MG SL tablet Place 1 tablet (0.4 mg total) under the tongue every 5 (five) minutes as needed for Chest pain (no more tiana 3 doses).  Qty: 60 tablet, Refills: 11    Associated Diagnoses: Coronary artery disease, angina presence unspecified, unspecified vessel or lesion type, unspecified whether native or transplanted heart; Carotid artery disease, unspecified laterality         STOP taking these medications       amLODIPine (NORVASC) 10 MG tablet Comments:   Reason for Stopping:         clopidogrel (PLAVIX) 75 mg tablet Comments:   Reason for Stopping:         hydroCHLOROthiazide (HYDRODIURIL) 25 MG tablet  Comments:   Reason for Stopping:               I certify that this patient is confined to her home and needs intermittent skilled nursing care, physical therapy and occupational therapy.      Mechelle Gayle MD

## 2020-03-11 NOTE — DISCHARGE SUMMARY
"[facility] Hospital Medicine  Discharge Summary      Patient Name: Ciaar Lozano  MRN: [id number]  Admission Date: 3/5/2020  Hospital Length of Stay: 5 days  Discharge Date and Time:  03/12/2020 3:04 PM  Attending Physician: Mechelle Gayle MD   Discharging Provider: Mechelle Gayle MD  Primary Care Provider: Andreina Gonzales MD    Hospital Medicine Team: [facility] MED D Mechelle Gayle MD    HPI:  84 y.o. F with CAD, HFpEF, HLD, HTN, h/o noncompliance and syncope who presents to [facility] ED for acute onset BLE edema x2 days with associated SOB. Patient is poor historian and no family at bedside for collateral.   Patient reports lower extremity swelling for the past few days but denies any orthopnea, PND, cough, wheezing, chest tightness, weight gain.  She can tell me she is on aspirin but is not aware of any of her other medications and states " I don't know" when asked her medical problems.  Patient does not wear oxygen at home and is not on a daily lasix, per chart review.     * No surgery found *      Hospital Course:  Patient with medical history significant for CAD, HFpEF, HLD, HTN admitted for Acute on chronic systolic & diastolic heart failure.     Acute on chronic systolic and diastolic heart failure  Hypervolemic on admission exam and imaging and BNP significantly elevated to 2600 on admission  - Last echo 2014 with low-normal EF and diastolic dysfunction  - on HCTZ as OP  - unclear if patient has been compliant with her home meds and even states that the granddaughter she lives with is in the hospital.  - patient is Lasix-naive and was given 80 mg IVP of Lasix in ED.    - Started Lasix 40 mg IVP BID; assess response symptomatically and renal function.    - monitor response with daily weights, strict I/Os, oxygen requirements  - Na restricted/fluid restricted diet 1500 mL  - TTE with EF 30% now; Consulted Cardiology: start GDMT and titrate as needed. No indication for Plavix. Start " lopressor 25 mg PO BID (can transition to Toprol XL at discharge). Low dose ACE/ARB - home  lisinopril resumed at lower dose. Continue Lasix IV. Cardiac PET stress when more stable.  - Decreased amlodipine in order to start BB and ACEi  - per Cardiology: Lopressor 25 mg PO BID (transition to Toprol XL at discharge). Low dose ACE/ARB. Continue Lasix IV.   - Final Cardiology recommendations:  Lopressor 25 mg PO BID (transition to Toprol XL at discharge), discontinue amlodipine and increase lisinopril, change IV Lasix to PO Lasix 40 mg daily; discontinue home HCTZ. Compression stockings for LE edema.  Cardiac PET stress as outpatient.     Anemia  Hg 7.7, down from baseline of 9 -11 last year  - iron studies consistent with iron deficiency and chronic disease.   - TSH slightly low, FT4 normal.   - B12 and folate normal.     CAD (coronary artery disease)  Carotid artery disease  Hyperlipidemia LDL goal < 100  Elevated troponin I level  Likely type II NSTEMI from demand/CHF exacerbation.  Concern for worsening EF; consulted Cardiology  Plan for PET Stress as OP  - continue with DAPT, statin  - last lipid studies 5/2019 benign     Hypertension  Controlled  - hold HCTZ   - held lisinopril 40 mg PO daily as she is normotensive and unclear if compliant with home meds  - continued amlodipine 10 mg PO daily  - decreased amlodipine as resumed lower dose lisinopril and started BB.     Memory deficits  Non-adherence to medical recommendations  Likely has unspecified vs vascular dementia  - PT/OT consulted for debility; plan for home health  - delirium precautions, fall precautions  - patient refused HME at discharge     Chronic renal failure, stage 3 (moderate)  Chronic and stable     LBBB (left bundle branch block)  Chronic and stable    Consults:   Consults (From admission, onward)        Status Ordering Provider     [facility] Medicine PharmD Consult  Once     Provider:  (Not yet assigned)    Completed ENMANUEL MARTIN      "Inpatient consult to Cardiology  Once     Provider:  (Not yet assigned)    Completed CELIA FLANNERY     Inpatient consult to Social Work  Once     Provider:  (Not yet assigned)    Acknowledged CELIA FLANNERY     IP consult to case management  Once     Provider:  (Not yet assigned)    Acknowledged ENMANUEL MARTIN          Final Active Diagnoses:    Diagnosis Date Noted POA    PRINCIPAL PROBLEM:  Chronic combined systolic and diastolic heart failure [I50.42] 03/06/2020 Yes    Chronic renal failure, stage 3 (moderate) [N18.3] 03/06/2020 Yes    Anemia [D64.9] 03/06/2020 Yes    Memory deficits [R41.3] 03/06/2020 Yes    Carotid artery disease [I73.9]  Yes    CAD (coronary artery disease) [I25.10] 08/12/2014 Yes    LBBB (left bundle branch block) [I44.7] 07/16/2014 Yes    Hypertension [I10] 11/19/2012 Yes     Chronic    Hyperlipidemia LDL goal < 100 [E78.5] 11/19/2012 Yes     Chronic      Problems Resolved During this Admission:    Diagnosis Date Noted Date Resolved POA    Elevated troponin I level [R79.89] 03/06/2020 03/10/2020 Yes      Discharged Condition: poor    Disposition: Home-Health Care [facility]    Follow Up:  Follow-up Information     Andreina Gonzales MD. Schedule an appointment as soon as possible for a visit in 1 week.    Specialty:  Internal Medicine  Why:  For discharge from hospital follow up, Repeat labwork  Contact information:  [address] [facility] [address]  [phone]             [facility] CARDIOLOGY. Schedule an appointment as soon as possible for a visit in 1 week.    Specialty:  Cardiology  Why:  To establish care, To schedule PET Stress Test  Contact information:  [address] [facility] [address]  [phone]               Patient Instructions:      WALKER FOR HOME USE     Order Specific Question Answer Comments   Type of Walker: Ko (4'4"-5'7")    With wheels? Yes    Height: 5' 2" (1.575 m)    Weight: 74.3 kg (163 lb 12.8 oz)    Length of need (1-99 " "months): 99    Does patient have medical equipment at home? cane, straight    Please check all that apply: Walker will be used for gait training.    Please check all that apply: Patient is unable to safely ambulate without equipment.      COMMODE FOR HOME USE     Order Specific Question Answer Comments   Type: Standard    Height: 5' 2" (1.575 m)    Weight: 74.3 kg (163 lb 12.8 oz)    Does patient have medical equipment at home? cane, straight    Length of need (1-99 months): 99      TRANSFER TUB BENCH FOR HOME USE     Order Specific Question Answer Comments   Type of Transfer Tub Bench: Unpadded    Height: 5' 2" (1.575 m)    Weight: 74.3 kg (163 lb 12.8 oz)    Does patient have medical equipment at home? cane, straight    Length of need (1-99 months): 99      Ambulatory referral/consult to Cardiology   Standing Status: Future   Referral Priority: Routine Referral Type: Consultation   Referral Reason: Specialty Services Required   Requested Specialty: Cardiology   Number of Visits Requested: 1     Diet Cardiac   Order Comments: 1.5 L FLUID RESTRICTION, 2 g SODIUM     Notify your health care provider if you experience any of the following:  temperature >100.4     Notify your health care provider if you experience any of the following:  persistent nausea and vomiting or diarrhea     Notify your health care provider if you experience any of the following:  difficulty breathing or increased cough     Notify your health care provider if you experience any of the following:  persistent dizziness, light-headedness, or visual disturbances     Notify your health care provider if you experience any of the following:  increased confusion or weakness     Activity as tolerated     Medications:  Reconciled Home Medications:      Medication List      START taking these medications    ferrous sulfate 325 (65 FE) MG EC tablet  Take 1 tablet (325 mg total) by mouth once daily.     furosemide 40 MG tablet  Commonly known as:  " LASIX  Take 1 tablet (40 mg total) by mouth once daily.     metoprolol succinate 25 MG 24 hr tablet  Commonly known as:  TOPROL-XL  Take 1 tablet (25 mg total) by mouth once daily.        CHANGE how you take these medications    atorvastatin 80 MG tablet  Commonly known as:  LIPITOR  Take 1 tablet (80 mg total) by mouth every evening.  What changed:    · how much to take  · how to take this  · when to take this  · additional instructions     lisinopriL 10 MG tablet  Take 1 tablet (10 mg total) by mouth once daily.  What changed:    · medication strength  · how much to take  · how to take this  · when to take this  · additional instructions        CONTINUE taking these medications    aspirin 81 MG EC tablet  Commonly known as:  ECOTRIN  Take 81 mg by mouth once daily.     nitroGLYCERIN 0.4 MG SL tablet  Commonly known as:  NITROSTAT  Place 1 tablet (0.4 mg total) under the tongue every 5 (five) minutes as needed for Chest pain (no more tiana 3 doses).        STOP taking these medications    amLODIPine 10 MG tablet  Commonly known as:  NORVASC     clopidogreL 75 mg tablet  Commonly known as:  PLAVIX     hydroCHLOROthiazide 25 MG tablet  Commonly known as:  HYDRODIURIL            Significant Diagnostic Studies: Labs:   CMP   Recent Labs   Lab 03/10/20  0518 03/11/20  0510    140   K 4.2 3.7    104   CO2 29 28   GLU 78 161*   BUN 27* 28*   CREATININE 1.0 1.1   CALCIUM 8.7 8.4*   ALBUMIN 2.4* 2.4*   ANIONGAP 6* 8   ESTGFRAFRICA >60.0 53.7*   EGFRNONAA 52.2* 46.5*   CBC   Recent Labs   Lab 03/10/20  0518 03/11/20  0510   WBC 4.62 4.32   HGB 7.0* 7.1*   HCT 24.4* 25.1*    198   Lipid Panel   Lab Results   Component Value Date    CHOL 94 (L) 03/06/2020    HDL 35 (L) 03/06/2020    LDLCALC 48.2 (L) 03/06/2020    TRIG 54 03/06/2020    CHOLHDL 37.2 03/06/2020   Troponin   Recent Labs   Lab 03/06/20  1230   TROPONINI 0.079*     Radiology:  Results for orders placed or performed during the hospital encounter of  "03/05/20   X-Ray Chest PA And Lateral    Narrative    EXAMINATION:  XR CHEST PA AND LATERAL    CLINICAL HISTORY:  Provided history is "  Shortness of breath".    TECHNIQUE:  Frontal and lateral views of the chest were performed.    COMPARISON:  08/28/2014.    FINDINGS:  Cardiac wires overlie the chest.  Examination is limited by poor positioning.  Cardiomediastinal silhouette is enlarged but similar to the prior study.  Atherosclerotic calcifications overlie the aortic arch.  There is central vascular congestion, bilateral perihilar airspace disease, and small to moderate bilateral pleural effusions with adjacent passive atelectasis versus airspace disease.  No distinct pneumothorax.      Impression    Cardiomegaly with findings suggestive of CHF exacerbation and/or volume overload.  Small to moderate bilateral pleural effusions.      Electronically signed by: Roberto Carlos Horton MD  Date:    03/05/2020  Time:    22:25      Transthoracic echo (TTE) complete (Cupid Only):   Results for orders placed or performed during the hospital encounter of 03/05/20   Echo Color Flow Doppler? Yes   Result Value Ref Range    Ascending aorta 3.34 cm    STJ 2.99 cm    IVS 1.37 (A) 0.6 - 1.1 cm    LA size 4.77 cm    Left Atrium Major Axis 7.23 cm    Left Atrium Minor Axis 4.87 cm    LVIDD 5.52 3.5 - 6.0 cm    LVIDS 4.68 (A) 2.1 - 4.0 cm    LVOT diameter 2.02 cm    LVOT peak VTI 13.61 cm    PW 1.13 (A) 0.6 - 1.1 cm    MV Peak A Arvind 0.94 m/s    E wave decelartion time 223.84 msec    MV Peak E Arvind 1.50 m/s    RA Major Axis 4.48 cm    RA Width 3.37 cm    RVDD 3.15 cm    Sinus 3.33 cm    TAPSE 1.48 cm    TR Max Arvind 2.97 m/s    TDI LATERAL 0.06 m/s    TDI SEPTAL 0.05 m/s    LA WIDTH 4.57 cm    LV Diastolic Volume 148.87 mL    LV Systolic Volume 101.33 mL    LVOT peak arvind 0.72 m/s    LV LATERAL E/E' RATIO 25.00 m/s    LV SEPTAL E/E' RATIO 30.00 m/s    FS 15 %    LA volume 107.84 cm3    LV mass 289.85 g    Left Ventricle Relative Wall Thickness " 0.41 cm    E/A ratio 1.60     Mean e' 0.06 m/s    LVOT area 3.2 cm2    LVOT stroke volume 43.59 cm3    E/E' ratio 27.27 m/s    Triscuspid Valve Regurgitation Peak Gradient 35 mmHg    BSA 1.81 m2    LV Systolic Volume Index 57.5 mL/m2    LV Diastolic Volume Index 84.52 mL/m2    LA Volume Index 61.2 mL/m2    LV Mass Index 165 g/m2    Right Atrial Pressure (from IVC) 3 mmHg    TV rest pulmonary artery pressure 38 mmHg    Narrative    · Mild left ventricular enlargement.  · Eccentric left ventricular hypertrophy.  · Moderately decreased left ventricular systolic function. The estimated   ejection fraction is 30%.  · Local segmental wall motion abnormalities.  · Grade II (moderate) left ventricular diastolic dysfunction consistent   with pseudonormalization.  · Severe left atrial enlargement.  · Low normal right ventricular systolic function.  · Mild-to-moderate aortic regurgitation.  · Moderate mitral sclerosis.  · Moderate-to-severe mitral regurgitation.  · Mild tricuspid regurgitation.  · Normal central venous pressure (3 mmHg).  · The estimated PA systolic pressure is 38 mmHg.  · Small posterolateral pericardial effusion.          Pending Diagnostic Studies:     None        Indwelling Lines/Drains at time of discharge: none    Time spent on the discharge of patient: 50 minutes  Patient was seen and examined on the date of discharge and determined to be suitable for discharge.    Mechelle Gayle MD  Department of Hospital Medicine  [facility]

## 2020-03-11 NOTE — NURSING
Home Oxygen Evaluation    Date Performed: 3/11/2020    1) Patient's Home O2 Sat on room air, while at rest: 96%        If O2 sats on room air at rest are 88% or below, patient qualifies. No additional testing needed. Document N/A in steps 2 and 3. If 89% or above, complete steps 2.      2) Patient's O2 Sat on room air while exercisin%        If O2 sats on room air while exercising remain 89% or above patient does not qualify, no further testing needed Document N/A in step 3. If O2 sats on room air while exercising are 88% or below, continue to step 3.      3) Patient's O2 Sat while exercising on O2: N/A         (Must show improvement from #2 for patients to qualify)    If O2 sats improve on oxygen, patient qualifies for portable oxygen. If not, the patient does not qualify.

## 2020-03-11 NOTE — PLAN OF CARE
Attempted to reach patients family RE: discharge today. Tried the following numbers listed on face sheet, [phone] no answer no voicemail, [phone] no answer no voicemail, [phone] left voicemail to return CM call.

## 2020-03-11 NOTE — PLAN OF CARE
Problem: Occupational Therapy Goal  Goal: Occupational Therapy Goal  Description  Goals to be met by: 3/13/2020    Patient will increase functional independence with ADLs by performing:    Feeding with Modified [place].  UE Dressing with Set-up Assistance.  LE Dressing with Stand-by Assistance w/ use of AE.  Grooming while standing with Stand-by Assistance.  Toileting from bedside commode with Stand-by Assistance for hygiene and clothing management.   Toilet transfer to bedside commode with Stand-by Assistance.     Outcome: Ongoing, Progressing      SUBJECTIVE:   Bi Sawyer is a 25 year old male presenting with a chief complaint of   Chief Complaint   Patient presents with     Urgent Care     cough for 2 weeks   .  Cough was improved then 6 days ago began again    Onset of symptoms was 6 day(s) ago.  Course of illness is worsening.    Severity moderate  Current and Associated symptoms: sweats, runny nose, cough - productive, shortness of breath, sore throat, facial pain/pressure and body aches  Treatment measures tried include Tylenol/Ibuprofen and Decongestants.  Predisposing factors include None.    SKIN: no worrisome rashes, no worrisome moles, no worrisome lesions  EYES: no acute vision problems or changes  ENT: no ear problems, no mouth problems, no throat problems  RESP: no significant cough, no shortness of breath  CV: no chest pain, no palpitations, no new or worsening peripheral edema  GI: no nausea, no vomiting, no constipation, no diarrhea  : no frequency, no dysuria, no hematuria  MUSCULOSKELETAL:mylagias  NEURO: no weakness, no dizziness, no syncope, no headaches  ENDOCRINE: no temperature intolerance, no skin/hair changes  HEME: no easy bruising, no bleeding problems      Past Medical History:   Diagnosis Date     NO ACTIVE PROBLEMS (aka NONE)      No current outpatient medications on file.     Social History     Tobacco Use     Smoking status: Never Smoker     Smokeless tobacco: Never Used   Substance Use Topics     Alcohol use: Yes     Alcohol/week: 0.0 standard drinks     Comment: rare       OBJECTIVE  /88   Pulse 99   Temp 99.3  F (37.4  C) (Tympanic)   Resp 18   Wt 124.7 kg (275 lb)   SpO2 97%   BMI 39.46 kg/m          GENERAL APPEARANCE: healthy appearing, alert     EYES: PERRL, EOMI, sclera non-icteric     HENT: ear canals and TM's normal, nose and mouth without ulcers or lesions and tonsillar exudate     NECK: no adenopathy or asymmetry, thyroid normal to palpation     RESP: lungs clear to auscultation - no rales, rhonchi  or wheezes     CV: regular rates and rhythm, no murmurs, rubs, or gallop     ABDOMEN:  soft, nontender, no HSM or masses and bowel sounds normal     SKIN: no suspicious lesions or rashes     NEURO: Normal strength and tone, mentation intact and speech normal      Labs:  No results found for this or any previous visit (from the past 24 hour(s)).    X-Ray was not done.        ICD-10-CM    1. Viral URI with cough J06.9     B97.89        Ibuprofen, Fluids, Rest and OTC cough suppressant/expectorant    Followup:  No follow-ups on file.    There are no Patient Instructions on file for this visit.      TEODORA Platt, CNP  [facility] Provider

## 2020-03-11 NOTE — PLAN OF CARE
Problem: Fall Injury Risk  Goal: Absence of Fall and Fall-Related Injury  Outcome: Ongoing, Progressing  Intervention: Identify and Manage Contributors to Fall Injury Risk  Flowsheets (Taken 3/11/2020 0504)  Self-Care Promotion: independence encouraged; BADL personal routines maintained; BADL personal objects within reach; safe use of adaptive equipment encouraged  Medication Review/Management: medications reviewed  Intervention: Promote Injury-Free Environment  Flowsheets (Taken 3/11/2020 0504)  Safety Promotion/Fall Prevention: bed alarm set; assistive device/personal item within reach; bedside commode chair; nonskid shoes/socks when out of bed; side rails raised x 2  Environmental Safety Modification: assistive device/personal items within reach; clutter free environment maintained; lighting adjusted; room organization consistent     POC discussed with patient. Patient alert and oriented. No acute changes occurred overnight. Patient bed is locked and in the lowest position. Patient safety is maintained. Will continue to monitor.

## 2020-03-11 NOTE — PLAN OF CARE
03/11/20 1406   Post-Acute Status   Post-Acute Authorization Home Health/Hospice  ([facility])   Home Health/Hospice Status Referrals Sent   Discharge Delays None known at this time

## 2020-03-11 NOTE — PT/OT/SLP PROGRESS
"Occupational Therapy   Treatment    Name: Ciara Lozano  MRN: [id number]  Admitting Diagnosis:  Chronic combined systolic and diastolic heart failure       Recommendations:     Discharge Recommendations: home with home health  Discharge Equipment Recommendations:  bedside commode, walker, rolling, shower chair  Barriers to discharge:  None    Assessment:     Ciara Lozano is a 84 y.o. female with a medical diagnosis of Chronic combined systolic and diastolic heart failure. She presents with the following performance deficits affecting function: weakness, impaired endurance, impaired self care skills, impaired functional mobilty, gait instability, impaired balance, impaired cardiopulmonary response to activity. Patient's participation in today's session was fair. Patient became agitated at times and attempted to reach and grab therapist's arm when therapist was reaching for patient's call light. Therapist notified RN that patient was agitated at times during session. At this time, patient will continue to benefit from acute skilled therapy intervention to address deficits/underlying impairments and progress towards prior level of function. After discharge, patient will benefit from receiving home health therapy services to ensure safety and independence in the home environment.    Rehab Prognosis: Fair/Good; patient would benefit from acute skilled OT services to address these deficits and reach maximum level of function.       Plan:     Patient to be seen 3 x/week to address the above listed problems via self-care/home management, therapeutic activities, therapeutic exercises  · Plan of Care Expires: 04/06/20  · Plan of Care Reviewed with: patient    Subjective     Pain/Comfort:  · Pain Rating 1: Patient reported no pain at start of session but stated "my legs hurt" when sitting EOB.    Objective:     Communicated with: RN prior to session. Patient found HOB elevated with bed alarm upon OT entry to room.    General " Precautions: Standard, fall   Orthopedic Precautions:N/A   Braces: N/A     Occupational Performance:     Bed Mobility:    · Patient completed Scooting/Bridging with stand by assistance  · Patient completed Supine to Sit with contact guard assistance and HOB elevated     Functional Mobility/Transfers:  · Patient completed Sit <> Stand Transfer with stand by assistance with no assistive device   · Patient completed Bed > Chair Transfer using Step Transfer technique with contact guard assistance with straight cane  · Functional Mobility: Patient ambulated ~15 feet in room using straight cane with CGA. Patient presented with poor, forward flexed posture but was not receptive to therapist's feedback in order to improve posture.    Activities of Daily Living:  · Lower Body Dressing: supervision donmarlon slippers sitting EOB    [facility] 6 Click ADL: 17    Treatment & Education:   Therapist provided facilitation and instruction of proper body mechanics, energy conservation, and fall prevention strategies during tasks listed above.   Instructed patient to sit in bedside chair daily to increase OOB/activity tolerance.   Instructed patient to use call light to have nursing staff assist with transfers.    Educated patient on OT POC and answered all questions within OT scope of practice.   Whiteboard updated     Patient left up in chair with all lines intact, call button in reach, chair alarm on and RN notifiedEducation:      GOALS:   Multidisciplinary Problems     Occupational Therapy Goals        Problem: Occupational Therapy Goal    Goal Priority Disciplines Outcome Interventions   Occupational Therapy Goal     OT, PT/OT Ongoing, Progressing    Description:  Goals to be met by: 3/13/2020    Patient will increase functional independence with ADLs by performing:    Feeding with Modified Gage.  UE Dressing with Set-up Assistance.  LE Dressing with Stand-by Assistance w/ use of AE.  Grooming while standing with Stand-by  Assistance.  Toileting from bedside commode with Stand-by Assistance for hygiene and clothing management.   Toilet transfer to bedside commode with Stand-by Assistance.                      Time Tracking:     OT Date of Treatment: 03/11/20  OT Start Time: 1403  OT Stop Time: 1413  OT Total Time (min): 10 min    Billable Minutes:Therapeutic Activity 10    Sarah Nelson OT  3/11/2020

## 2020-03-11 NOTE — PLAN OF CARE
"SW met with pt in her room to discuss her home equipment.  Pt stated three (3) times that she did not want or need any equipment for the home, as she "already has enough".  SW asked what equipment she had in the home and pt replied "My granddaughter has enough, baby!  I don't want it!"  Pt stated that she would not take the equipment if it arrived here.  SW notified MADDIE Andrews.  SW also spoke with pt's granddaLindsey brown ([phone]) and was told that she or her sister Lynnette ([phone]) would come to pickup pt to bring her to pt's home.  MADDIE provided Alma Delia TAMMI RN, with this contact information for granddaughters.      Tere Ngo LMSW  "

## 2020-03-13 ENCOUNTER — TELEPHONE (OUTPATIENT)
Dept: INTERNAL MEDICINE | Facility: CLINIC | Age: 85
End: 2020-03-13

## 2020-03-13 PROCEDURE — G0180 MD CERTIFICATION HHA PATIENT: HCPCS | Mod: ,,, | Performed by: INTERNAL MEDICINE

## 2020-03-13 PROCEDURE — G0180 PR HOME HEALTH MD CERTIFICATION: ICD-10-PCS | Mod: ,,, | Performed by: INTERNAL MEDICINE

## 2020-03-13 NOTE — TELEPHONE ENCOUNTER
----- Message from Lauren Borrego sent at 3/13/2020  1:37 PM CDT -----  Contact: Benjamin [phone]  Type:  Needs Medical Advice    Who Called:  [place] w/ [facility]    Symptoms (please be specific): therapy twice a week     Would the patient rather a call back or a response via [facility]? Call    Best Call Back Number:  [phone]    Additional Information:  Sourav called to let dr know he will be doing therapy with pt twice a week. He is requesting a call back.

## 2020-04-13 ENCOUNTER — EXTERNAL HOME HEALTH (OUTPATIENT)
Dept: HOME HEALTH SERVICES | Facility: HOSPITAL | Age: 85
End: 2020-04-13
Payer: MEDICARE

## 2020-07-17 DIAGNOSIS — Z71.89 COMPLEX CARE COORDINATION: ICD-10-CM

## 2020-11-23 ENCOUNTER — TELEPHONE (OUTPATIENT)
Dept: INTERNAL MEDICINE | Facility: CLINIC | Age: 85
End: 2020-11-23

## 2020-11-23 ENCOUNTER — PATIENT OUTREACH (OUTPATIENT)
Dept: ADMINISTRATIVE | Facility: CLINIC | Age: 85
End: 2020-11-23

## 2020-11-23 ENCOUNTER — EXTERNAL HOSPITAL ADMISSION (OUTPATIENT)
Dept: ADMINISTRATIVE | Facility: CLINIC | Age: 85
End: 2020-11-23

## 2020-11-23 DIAGNOSIS — J96.90 RESPIRATORY FAILURE, UNSPECIFIED CHRONICITY, UNSPECIFIED WHETHER WITH HYPOXIA OR HYPERCAPNIA: Primary | ICD-10-CM

## 2020-11-23 RX ORDER — CLOPIDOGREL BISULFATE 75 MG/1
75 TABLET ORAL DAILY
COMMUNITY

## 2020-11-23 NOTE — PATIENT INSTRUCTIONS
Left- or Right- Side Congestive Heart Failure (CHF)    The heart is a large muscle. It is a pump that circulates blood throughout the body. Blood carries oxygen to all of the organs, including the brain, muscles, and skin. After your body takes the oxygen out of the blood, the blood returns to the heart. The right side of the heart collects the blood from the body and pumps it to the lungs. In the lungs, it gets fresh oxygen and gives up carbon dioxide. The oxygen-rich blood from the lungs then returns to the left side of the heart, where it is pumped back out to the rest of your body, starting the process all over.  Congestive heart failure (CHF) occurs when the heart muscle is weakened. This affects the pumping action of the heart. Heart failure can affect the right side of the heart or the left side. But heart failure may affect not only the right side of the heart or only the left side. Although it may have started on one side, it can and often eventually does affect both sides.  Right-side heart failure  When the right side of the heart is weakened, it cant handle the blood it is getting from the rest of the body. This blood returns to the heart through veins. When too much pressure builds up in the veins, fluid leaks out into the tissues. Gravity then causes that fluid to move to those parts of the body that are the lowest. So one of the first symptoms of right-side CHF can include swelling in the feet and ankles. If the condition gets worse, the swelling can even go up past the knees. Sometimes it gets so severe, the liver can get congested as well.  Left-side heart failure  When the left side of the heart is weakened, it cant handle the blood it gets from the lungs. Pressure then builds up in the veins of the lungs, causing fluid to leak into the lung tissues. This may cause CHF and pulmonary edema. This causes you to feel short of breath, weak, or dizzy. These symptoms are often worse with exertion,  such as when climbing stairs or walking up hills. Lying with your head flat is uncomfortable and can make your breathing worse. This may make sleeping difficult. You may need to use extra pillows to elevate your upper body to sleep well. The same is true when just resting during the daytime.  There are many causes of heart failure including:  · Coronary artery disease  · Past heart attack (also known as acute myocardial infarction, or AMI)  · High blood pressure  · Damaged heart valve  · Diabetes  · Obesity  · Cigarette smoking  · Alcohol abuse  Heart failure is a chronic condition. There is no cure. The purpose of medical treatment is to improve the pumping action of the heart. The main way to do this is to remove excess water from the body. A number of medicines can help reach this goal, improve symptoms, and prevent the heart from becoming weaker. Sometimes, heart failure can become so severe that a device is placed in the heart to help with pumping. Another major goal is to better treat the causes of heart failure, such as diabetes and high blood pressure, by making changes in your lifestyle and maximizing medical control when needed.  Home care  Follow these guidelines when caring for yourself at home:  · Check your weight every day. This is very important because a sudden increase in weight gain could mean worsening heart failure. Keep these things in mind:  ¨ Use the same scale every day.  ¨ Weigh yourself at the same time every day.  ¨ Make sure the scale is on a hard floor surface, not on a rug or carpet.  ¨ Keep a record of your weight every day so your healthcare provider can see it. If you are not given a log sheet for this, keep a separate journal for this purpose.   · Cut back on the amount of salt (sodium) you eat. Follow your healthcare provider's recommendation on how much salt or sodium you should have each day.  ¨ Avoid high-salt foods. These include olives, pickles, smoked meats, salted potato  chips, and most prepared foods.  ¨ Don't add salt to your food at the table. Use only small amounts of salt when cooking.  ¨ Read the labels carefully on food packages to learn how much salt or sodium is in each serving in the package. Remember, a can or package of food may contain more than 1 serving. So if you eat all the food in the package, you may be getting more salt than you think.  · Follow your healthcare provider's recommendations about how much fluid you should have. Be aware that some foods, such as soup, pudding, and juicy fruits like oranges or melons, contain liquid. You'll need to count the liquid in those foods as part of your daily fluid intake. Your provider can help you with this.  · Stop smoking.  · Cut back on how much alcohol you drink.  · Lose weight if you are overweight. The excess weight adds a lot of stress on the workload of the heart.  · Stay active. Talk with your provider about an exercise program that is safe for your heart.  · Keep your feet elevated to reduce swelling. Ask your provider about support hose as a preventive treatment for daytime leg swelling.  Besides taking your medicine as instructed, an important part of treatment is lifestyle changes. These include diet, physical activity, stopping smoking, and weight control.  Improve your diet by including more fresh foods, cutting back on how much sugar and saturated fat you eat, and eating fewer processed foods and less salt.  Follow-up care  Follow up with your healthcare provider, or as advised.  Make sure to keep any appointments that were made for you. These can help better control your congestive heart failure. You will need to follow up with your provider on a routine basis to make sure your heart failure is well managed.  If an X-ray, ECG, or other tests were done, you will be told of any new findings that may affect your care.  Call 911  Call 911 if you:  · Become severely short of breath  · Feel lightheaded, or feel  like you might pass out or faint  · Have chest pain or discomfort that is different than usual, the medicines your doctor told you to use for this don't help, or the pain lasts longer than 10 to 15 minutes  · You suddenly develop a rapid heart rate  When to seek medical advice  The following may be signs that your heart failure is getting worse. Call your healthcare provider right away if any of these happen:  · Sudden weight gain. This means 3 or more pounds in one day, or 5 or more pounds in 1 week.  · Trouble breathing not related to being active  · New or increased swelling of your legs or ankles  · Swelling or pain in your abdomen  · Breathing trouble at night. This means waking up short of breath or needing more pillows to breathe.  · Frequent coughing that doesnt go away  · Feeling much more tired than usual  Date Last Reviewed: 1/4/2016  © 4004-7600 [employer]. [address] [place] [address], [place] [address]. All rights reserved. This information is not intended as a substitute for professional medical care. Always follow your healthcare professional's instructions.

## 2020-11-27 ENCOUNTER — HOSPITAL ENCOUNTER (INPATIENT)
Facility: HOSPITAL | Age: 85
LOS: 14 days | Discharge: NURSING, REHAB OR LONG-TERM CARE | DRG: 871 | End: 2020-12-11
Attending: EMERGENCY MEDICINE | Admitting: HOSPITALIST
Payer: MEDICARE

## 2020-11-27 DIAGNOSIS — R41.82 AMS (ALTERED MENTAL STATUS): ICD-10-CM

## 2020-11-27 DIAGNOSIS — E03.9 ACQUIRED HYPOTHYROIDISM: ICD-10-CM

## 2020-11-27 DIAGNOSIS — R79.89 TROPONIN LEVEL ELEVATED: ICD-10-CM

## 2020-11-27 DIAGNOSIS — R53.81 PHYSICAL DECONDITIONING: ICD-10-CM

## 2020-11-27 DIAGNOSIS — R78.81 BACTEREMIA DUE TO KLEBSIELLA PNEUMONIAE: ICD-10-CM

## 2020-11-27 DIAGNOSIS — N30.00 ACUTE CYSTITIS WITHOUT HEMATURIA: ICD-10-CM

## 2020-11-27 DIAGNOSIS — A41.9 SEPSIS WITHOUT ACUTE ORGAN DYSFUNCTION, DUE TO UNSPECIFIED ORGANISM: ICD-10-CM

## 2020-11-27 DIAGNOSIS — I25.10 CORONARY ARTERY DISEASE INVOLVING NATIVE CORONARY ARTERY OF NATIVE HEART WITHOUT ANGINA PECTORIS: ICD-10-CM

## 2020-11-27 DIAGNOSIS — A41.9 SEPSIS, DUE TO UNSPECIFIED ORGANISM, UNSPECIFIED WHETHER ACUTE ORGAN DYSFUNCTION PRESENT: Primary | ICD-10-CM

## 2020-11-27 DIAGNOSIS — N13.39 OTHER HYDRONEPHROSIS: ICD-10-CM

## 2020-11-27 DIAGNOSIS — N17.9 AKI (ACUTE KIDNEY INJURY): ICD-10-CM

## 2020-11-27 DIAGNOSIS — R41.3 MEMORY DEFICITS: ICD-10-CM

## 2020-11-27 DIAGNOSIS — G93.41 SEPTIC ENCEPHALOPATHY: ICD-10-CM

## 2020-11-27 DIAGNOSIS — B96.1 BACTEREMIA DUE TO KLEBSIELLA PNEUMONIAE: ICD-10-CM

## 2020-11-27 DIAGNOSIS — R33.9 URINARY RETENTION: ICD-10-CM

## 2020-11-27 DIAGNOSIS — I50.9 HEART FAILURE, UNSPECIFIED HF CHRONICITY, UNSPECIFIED HEART FAILURE TYPE: ICD-10-CM

## 2020-11-27 DIAGNOSIS — I10 ESSENTIAL HYPERTENSION: ICD-10-CM

## 2020-11-27 DIAGNOSIS — D63.8 ANEMIA OF CHRONIC DISEASE: ICD-10-CM

## 2020-11-27 DIAGNOSIS — I50.42 CHRONIC COMBINED SYSTOLIC AND DIASTOLIC HEART FAILURE: ICD-10-CM

## 2020-11-27 DIAGNOSIS — A49.8 KLEBSIELLA PNEUMONIAE INFECTION: ICD-10-CM

## 2020-11-27 DIAGNOSIS — A41.9 SEPSIS: ICD-10-CM

## 2020-11-27 DIAGNOSIS — R07.9 CHEST PAIN: ICD-10-CM

## 2020-11-27 DIAGNOSIS — A41.9 SEPSIS WITHOUT ACUTE ORGAN DYSFUNCTION: ICD-10-CM

## 2020-11-27 PROBLEM — N18.9 ANEMIA ASSOCIATED WITH CHRONIC RENAL FAILURE: Status: ACTIVE | Noted: 2020-11-27

## 2020-11-27 PROBLEM — D63.1 ANEMIA ASSOCIATED WITH CHRONIC RENAL FAILURE: Status: ACTIVE | Noted: 2020-11-27

## 2020-11-27 PROBLEM — R80.9 PROTEINURIA: Status: ACTIVE | Noted: 2020-11-27

## 2020-11-27 PROBLEM — N18.30 CKD (CHRONIC KIDNEY DISEASE) STAGE 3, GFR 30-59 ML/MIN: Status: ACTIVE | Noted: 2020-11-27

## 2020-11-27 LAB
ALBUMIN SERPL BCP-MCNC: 2.7 G/DL (ref 3.5–5.2)
ALP SERPL-CCNC: 109 U/L (ref 55–135)
ALT SERPL W/O P-5'-P-CCNC: 25 U/L (ref 10–44)
AMPHET+METHAMPHET UR QL: NEGATIVE
ANION GAP SERPL CALC-SCNC: 14 MMOL/L (ref 8–16)
ANISOCYTOSIS BLD QL SMEAR: SLIGHT
APTT BLDCRRT: 25.1 SEC (ref 21–32)
AST SERPL-CCNC: 37 U/L (ref 10–40)
BACTERIA #/AREA URNS AUTO: ABNORMAL /HPF
BARBITURATES UR QL SCN>200 NG/ML: NEGATIVE
BASOPHILS # BLD AUTO: 0.04 K/UL (ref 0–0.2)
BASOPHILS NFR BLD: 0.4 % (ref 0–1.9)
BENZODIAZ UR QL SCN>200 NG/ML: NEGATIVE
BILIRUB SERPL-MCNC: 1.2 MG/DL (ref 0.1–1)
BILIRUB UR QL STRIP: NEGATIVE
BNP SERPL-MCNC: 1872 PG/ML (ref 0–99)
BUN SERPL-MCNC: 63 MG/DL (ref 8–23)
BURR CELLS BLD QL SMEAR: ABNORMAL
BZE UR QL SCN: NEGATIVE
CALCIUM SERPL-MCNC: 9.7 MG/DL (ref 8.7–10.5)
CANNABINOIDS UR QL SCN: NEGATIVE
CHLORIDE SERPL-SCNC: 108 MMOL/L (ref 95–110)
CK SERPL-CCNC: 148 U/L (ref 20–180)
CLARITY UR REFRACT.AUTO: ABNORMAL
CO2 SERPL-SCNC: 20 MMOL/L (ref 23–29)
COLOR UR AUTO: YELLOW
CREAT SERPL-MCNC: 3 MG/DL (ref 0.5–1.4)
CREAT UR-MCNC: 42 MG/DL (ref 15–325)
CREAT UR-MCNC: 43 MG/DL (ref 15–325)
CREAT UR-MCNC: 44 MG/DL (ref 15–325)
CTP QC/QA: YES
DIFFERENTIAL METHOD: ABNORMAL
EOSINOPHIL # BLD AUTO: 0 K/UL (ref 0–0.5)
EOSINOPHIL NFR BLD: 0 % (ref 0–8)
ERYTHROCYTE [DISTWIDTH] IN BLOOD BY AUTOMATED COUNT: 21.9 % (ref 11.5–14.5)
EST. GFR  (AFRICAN AMERICAN): 15.8 ML/MIN/1.73 M^2
EST. GFR  (NON AFRICAN AMERICAN): 13.7 ML/MIN/1.73 M^2
FERRITIN SERPL-MCNC: 217 NG/ML (ref 20–300)
FOLATE SERPL-MCNC: 5.6 NG/ML (ref 4–24)
GIANT PLATELETS BLD QL SMEAR: PRESENT
GLUCOSE SERPL-MCNC: 105 MG/DL (ref 70–110)
GLUCOSE UR QL STRIP: NEGATIVE
HCT VFR BLD AUTO: 32.9 % (ref 37–48.5)
HGB BLD-MCNC: 9.9 G/DL (ref 12–16)
HGB UR QL STRIP: ABNORMAL
HYALINE CASTS UR QL AUTO: 0 /LPF
IMM GRANULOCYTES # BLD AUTO: 0.03 K/UL (ref 0–0.04)
IMM GRANULOCYTES NFR BLD AUTO: 0.3 % (ref 0–0.5)
INR PPP: 1.2 (ref 0.8–1.2)
KETONES UR QL STRIP: NEGATIVE
LACTATE SERPL-SCNC: 1.8 MMOL/L (ref 0.5–2.2)
LEUKOCYTE ESTERASE UR QL STRIP: ABNORMAL
LYMPHOCYTES # BLD AUTO: 0.5 K/UL (ref 1–4.8)
LYMPHOCYTES NFR BLD: 5.3 % (ref 18–48)
MAGNESIUM SERPL-MCNC: 1.9 MG/DL (ref 1.6–2.6)
MCH RBC QN AUTO: 26.1 PG (ref 27–31)
MCHC RBC AUTO-ENTMCNC: 30.1 G/DL (ref 32–36)
MCV RBC AUTO: 87 FL (ref 82–98)
METHADONE UR QL SCN>300 NG/ML: NEGATIVE
MICROSCOPIC COMMENT: ABNORMAL
MONOCYTES # BLD AUTO: 0.5 K/UL (ref 0.3–1)
MONOCYTES NFR BLD: 5.5 % (ref 4–15)
NEUTROPHILS # BLD AUTO: 7.9 K/UL (ref 1.8–7.7)
NEUTROPHILS NFR BLD: 88.5 % (ref 38–73)
NITRITE UR QL STRIP: NEGATIVE
NRBC BLD-RTO: 0 /100 WBC
OPIATES UR QL SCN: NEGATIVE
OSMOLALITY SERPL: 305 MOSM/KG (ref 275–295)
OVALOCYTES BLD QL SMEAR: ABNORMAL
PCP UR QL SCN>25 NG/ML: NEGATIVE
PH UR STRIP: 8 [PH] (ref 5–8)
PHOSPHATE SERPL-MCNC: 4.3 MG/DL (ref 2.7–4.5)
PLATELET # BLD AUTO: 338 K/UL (ref 150–350)
PLATELET BLD QL SMEAR: ABNORMAL
PMV BLD AUTO: 11.9 FL (ref 9.2–12.9)
POCT GLUCOSE: 119 MG/DL (ref 70–110)
POCT GLUCOSE: 81 MG/DL (ref 70–110)
POIKILOCYTOSIS BLD QL SMEAR: SLIGHT
POTASSIUM SERPL-SCNC: 4.3 MMOL/L (ref 3.5–5.1)
PROCALCITONIN SERPL IA-MCNC: 1.92 NG/ML
PROT SERPL-MCNC: 7.4 G/DL (ref 6–8.4)
PROT UR QL STRIP: ABNORMAL
PROT UR-MCNC: 390 MG/DL (ref 0–15)
PROT/CREAT UR: 8.86 MG/G{CREAT} (ref 0–0.2)
PROTHROMBIN TIME: 12.9 SEC (ref 9–12.5)
RBC # BLD AUTO: 3.79 M/UL (ref 4–5.4)
RBC #/AREA URNS AUTO: 24 /HPF (ref 0–4)
SARS-COV-2 RDRP RESP QL NAA+PROBE: NEGATIVE
SODIUM SERPL-SCNC: 142 MMOL/L (ref 136–145)
SODIUM UR-SCNC: 126 MMOL/L (ref 20–250)
SP GR UR STRIP: 1.01 (ref 1–1.03)
SQUAMOUS #/AREA URNS AUTO: 2 /HPF
T4 FREE SERPL-MCNC: 1.23 NG/DL (ref 0.71–1.51)
TOXICOLOGY INFORMATION: NORMAL
TROPONIN I SERPL DL<=0.01 NG/ML-MCNC: 0.14 NG/ML (ref 0–0.03)
TROPONIN I SERPL DL<=0.01 NG/ML-MCNC: 0.17 NG/ML (ref 0–0.03)
TSH SERPL DL<=0.005 MIU/L-ACNC: 0.12 UIU/ML (ref 0.4–4)
URN SPEC COLLECT METH UR: ABNORMAL
UUN UR-MCNC: 103 MG/DL (ref 140–1050)
VIT B12 SERPL-MCNC: 341 PG/ML (ref 210–950)
WBC # BLD AUTO: 8.91 K/UL (ref 3.9–12.7)
WBC #/AREA URNS AUTO: >100 /HPF (ref 0–5)

## 2020-11-27 PROCEDURE — 85025 COMPLETE CBC W/AUTO DIFF WBC: CPT

## 2020-11-27 PROCEDURE — 81001 URINALYSIS AUTO W/SCOPE: CPT

## 2020-11-27 PROCEDURE — 84439 ASSAY OF FREE THYROXINE: CPT

## 2020-11-27 PROCEDURE — 84145 PROCALCITONIN (PCT): CPT

## 2020-11-27 PROCEDURE — 84443 ASSAY THYROID STIM HORMONE: CPT

## 2020-11-27 PROCEDURE — 82570 ASSAY OF URINE CREATININE: CPT

## 2020-11-27 PROCEDURE — 83880 ASSAY OF NATRIURETIC PEPTIDE: CPT

## 2020-11-27 PROCEDURE — 99223 PR INITIAL HOSPITAL CARE,LEVL III: ICD-10-PCS | Mod: AI,,, | Performed by: HOSPITALIST

## 2020-11-27 PROCEDURE — 82728 ASSAY OF FERRITIN: CPT

## 2020-11-27 PROCEDURE — 87186 SC STD MICRODIL/AGAR DIL: CPT | Mod: 59

## 2020-11-27 PROCEDURE — 99291 PR CRITICAL CARE, E/M 30-74 MINUTES: ICD-10-PCS | Mod: GC,CS,, | Performed by: EMERGENCY MEDICINE

## 2020-11-27 PROCEDURE — 82550 ASSAY OF CK (CPK): CPT

## 2020-11-27 PROCEDURE — 36415 COLL VENOUS BLD VENIPUNCTURE: CPT

## 2020-11-27 PROCEDURE — 96365 THER/PROPH/DIAG IV INF INIT: CPT

## 2020-11-27 PROCEDURE — 84100 ASSAY OF PHOSPHORUS: CPT

## 2020-11-27 PROCEDURE — 87077 CULTURE AEROBIC IDENTIFY: CPT | Mod: 59

## 2020-11-27 PROCEDURE — 99223 1ST HOSP IP/OBS HIGH 75: CPT | Mod: AI,,, | Performed by: HOSPITALIST

## 2020-11-27 PROCEDURE — 82607 VITAMIN B-12: CPT

## 2020-11-27 PROCEDURE — 84300 ASSAY OF URINE SODIUM: CPT

## 2020-11-27 PROCEDURE — 85730 THROMBOPLASTIN TIME PARTIAL: CPT

## 2020-11-27 PROCEDURE — 84484 ASSAY OF TROPONIN QUANT: CPT

## 2020-11-27 PROCEDURE — 25000003 PHARM REV CODE 250: Performed by: HOSPITALIST

## 2020-11-27 PROCEDURE — 99291 CRITICAL CARE FIRST HOUR: CPT | Mod: GC,CS,, | Performed by: EMERGENCY MEDICINE

## 2020-11-27 PROCEDURE — 83605 ASSAY OF LACTIC ACID: CPT

## 2020-11-27 PROCEDURE — 87086 URINE CULTURE/COLONY COUNT: CPT

## 2020-11-27 PROCEDURE — 85610 PROTHROMBIN TIME: CPT

## 2020-11-27 PROCEDURE — 83930 ASSAY OF BLOOD OSMOLALITY: CPT

## 2020-11-27 PROCEDURE — 84484 ASSAY OF TROPONIN QUANT: CPT | Mod: 91

## 2020-11-27 PROCEDURE — 82570 ASSAY OF URINE CREATININE: CPT | Mod: 91

## 2020-11-27 PROCEDURE — 82746 ASSAY OF FOLIC ACID SERUM: CPT

## 2020-11-27 PROCEDURE — 84540 ASSAY OF URINE/UREA-N: CPT

## 2020-11-27 PROCEDURE — 63600175 PHARM REV CODE 636 W HCPCS: Performed by: PHYSICIAN ASSISTANT

## 2020-11-27 PROCEDURE — 80053 COMPREHEN METABOLIC PANEL: CPT

## 2020-11-27 PROCEDURE — 11000001 HC ACUTE MED/SURG PRIVATE ROOM

## 2020-11-27 PROCEDURE — 87088 URINE BACTERIA CULTURE: CPT

## 2020-11-27 PROCEDURE — 99285 EMERGENCY DEPT VISIT HI MDM: CPT | Mod: 25

## 2020-11-27 PROCEDURE — 63600175 PHARM REV CODE 636 W HCPCS: Performed by: STUDENT IN AN ORGANIZED HEALTH CARE EDUCATION/TRAINING PROGRAM

## 2020-11-27 PROCEDURE — U0002 COVID-19 LAB TEST NON-CDC: HCPCS | Performed by: STUDENT IN AN ORGANIZED HEALTH CARE EDUCATION/TRAINING PROGRAM

## 2020-11-27 PROCEDURE — 83735 ASSAY OF MAGNESIUM: CPT

## 2020-11-27 PROCEDURE — 87040 BLOOD CULTURE FOR BACTERIA: CPT | Mod: 59

## 2020-11-27 PROCEDURE — 80307 DRUG TEST PRSMV CHEM ANLYZR: CPT

## 2020-11-27 RX ORDER — TALC
6 POWDER (GRAM) TOPICAL NIGHTLY PRN
Status: DISCONTINUED | OUTPATIENT
Start: 2020-11-27 | End: 2020-12-11 | Stop reason: HOSPADM

## 2020-11-27 RX ORDER — ASPIRIN 81 MG/1
81 TABLET ORAL DAILY
Status: DISCONTINUED | OUTPATIENT
Start: 2020-11-28 | End: 2020-11-28

## 2020-11-27 RX ORDER — METOPROLOL SUCCINATE 25 MG/1
25 TABLET, EXTENDED RELEASE ORAL DAILY
Status: DISCONTINUED | OUTPATIENT
Start: 2020-11-28 | End: 2020-11-28

## 2020-11-27 RX ORDER — IBUPROFEN 200 MG
16 TABLET ORAL
Status: DISCONTINUED | OUTPATIENT
Start: 2020-11-27 | End: 2020-12-11 | Stop reason: HOSPADM

## 2020-11-27 RX ORDER — ATORVASTATIN CALCIUM 20 MG/1
80 TABLET, FILM COATED ORAL NIGHTLY
Status: DISCONTINUED | OUTPATIENT
Start: 2020-11-27 | End: 2020-12-11 | Stop reason: HOSPADM

## 2020-11-27 RX ORDER — SODIUM CHLORIDE 9 MG/ML
INJECTION, SOLUTION INTRAVENOUS CONTINUOUS
Status: ACTIVE | OUTPATIENT
Start: 2020-11-27 | End: 2020-11-28

## 2020-11-27 RX ORDER — NITROGLYCERIN 0.4 MG/1
0.4 TABLET SUBLINGUAL EVERY 5 MIN PRN
Status: DISCONTINUED | OUTPATIENT
Start: 2020-11-27 | End: 2020-12-11 | Stop reason: HOSPADM

## 2020-11-27 RX ORDER — ONDANSETRON 2 MG/ML
4 INJECTION INTRAMUSCULAR; INTRAVENOUS EVERY 8 HOURS PRN
Status: DISCONTINUED | OUTPATIENT
Start: 2020-11-27 | End: 2020-12-11 | Stop reason: HOSPADM

## 2020-11-27 RX ORDER — GLUCAGON 1 MG
1 KIT INJECTION
Status: DISCONTINUED | OUTPATIENT
Start: 2020-11-27 | End: 2020-12-11 | Stop reason: HOSPADM

## 2020-11-27 RX ORDER — ACETAMINOPHEN 325 MG/1
650 TABLET ORAL EVERY 4 HOURS PRN
Status: DISCONTINUED | OUTPATIENT
Start: 2020-11-27 | End: 2020-12-11 | Stop reason: HOSPADM

## 2020-11-27 RX ORDER — MUPIROCIN 20 MG/G
OINTMENT TOPICAL 2 TIMES DAILY
Status: DISPENSED | OUTPATIENT
Start: 2020-11-27 | End: 2020-12-02

## 2020-11-27 RX ORDER — SODIUM CHLORIDE 0.9 % (FLUSH) 0.9 %
10 SYRINGE (ML) INJECTION
Status: DISCONTINUED | OUTPATIENT
Start: 2020-11-27 | End: 2020-12-11 | Stop reason: HOSPADM

## 2020-11-27 RX ORDER — POLYETHYLENE GLYCOL 3350 17 G/17G
17 POWDER, FOR SOLUTION ORAL DAILY
Status: DISCONTINUED | OUTPATIENT
Start: 2020-11-28 | End: 2020-12-11 | Stop reason: HOSPADM

## 2020-11-27 RX ORDER — CEFTRIAXONE 1 G/1
1 INJECTION, POWDER, FOR SOLUTION INTRAMUSCULAR; INTRAVENOUS
Status: DISCONTINUED | OUTPATIENT
Start: 2020-11-28 | End: 2020-11-28

## 2020-11-27 RX ORDER — AMOXICILLIN 250 MG
1 CAPSULE ORAL 2 TIMES DAILY
Status: DISCONTINUED | OUTPATIENT
Start: 2020-11-27 | End: 2020-12-11 | Stop reason: HOSPADM

## 2020-11-27 RX ORDER — FERROUS SULFATE 325(65) MG
325 TABLET, DELAYED RELEASE (ENTERIC COATED) ORAL DAILY
Status: DISCONTINUED | OUTPATIENT
Start: 2020-11-28 | End: 2020-12-11 | Stop reason: HOSPADM

## 2020-11-27 RX ORDER — CLOPIDOGREL BISULFATE 75 MG/1
75 TABLET ORAL DAILY
Status: DISCONTINUED | OUTPATIENT
Start: 2020-11-28 | End: 2020-11-28

## 2020-11-27 RX ORDER — LEVOTHYROXINE SODIUM 50 UG/1
50 TABLET ORAL
Status: DISCONTINUED | OUTPATIENT
Start: 2020-11-28 | End: 2020-12-11 | Stop reason: HOSPADM

## 2020-11-27 RX ORDER — IBUPROFEN 200 MG
24 TABLET ORAL
Status: DISCONTINUED | OUTPATIENT
Start: 2020-11-27 | End: 2020-12-11 | Stop reason: HOSPADM

## 2020-11-27 RX ADMIN — CEFTRIAXONE 2 G: 2 INJECTION, SOLUTION INTRAVENOUS at 03:11

## 2020-11-27 RX ADMIN — SODIUM CHLORIDE: 0.9 INJECTION, SOLUTION INTRAVENOUS at 07:11

## 2020-11-27 RX ADMIN — SODIUM CHLORIDE, SODIUM LACTATE, POTASSIUM CHLORIDE, AND CALCIUM CHLORIDE 500 ML: .6; .31; .03; .02 INJECTION, SOLUTION INTRAVENOUS at 10:11

## 2020-11-27 RX ADMIN — MUPIROCIN: 20 OINTMENT TOPICAL at 11:11

## 2020-11-27 RX ADMIN — SODIUM CHLORIDE, SODIUM LACTATE, POTASSIUM CHLORIDE, AND CALCIUM CHLORIDE 500 ML: .6; .31; .03; .02 INJECTION, SOLUTION INTRAVENOUS at 02:11

## 2020-11-27 RX ADMIN — DOCUSATE SODIUM 50MG AND SENNOSIDES 8.6MG 1 TABLET: 8.6; 5 TABLET, FILM COATED ORAL at 08:11

## 2020-11-27 RX ADMIN — ATORVASTATIN CALCIUM 80 MG: 20 TABLET, FILM COATED ORAL at 08:11

## 2020-11-27 NOTE — H&P
"History and Physical  Hospital Medicine       Patient Name: Ciara Lozano  MRN:  [id number]  Hospital Medicine Team: [facility] K Annel Sanchez MD  Date of Admission:  11/27/2020     Principal Problem:  Acute cystitis   Primary Care Physician: Andreina Gonzales MD      History of Present Illness:     Ms. Ciara Lozano is a 85 y.o. female with past medical history of CKD 3, chronic combined HF, HTN, CAD, memory issues, anemia of chronic dx, who was in her usual state of health which is unclear what that is but seems deconditioned. She cannot give much hx due to encephalopathy from sepsis/uti and likely mild uremia also from her JOHNNY on CKD3. She cant say what brought her here, doesn't answer a lot of questions. Says "you ask the same ones that the other guys did". Says its 2020, [place], [facility], and a hospital. Reports pain to her legs but no falls. Does not answer when asked about hydration or urine symptoms the last few days.  In ER retaining urine requiring straight cath and UA consistent with UTI. Johnny on admit, given small bolus in the ER.  TSH low found in the ER, CXR stable. FeNa compatible with post obstructive JOHNNY, low threshold cervantes if retains further, U/S kidneys ordered.  Trop mildly up, chronic issue with this (similar in earlier 2020), will trend.  Denies chest pain.   Other histoyr unable to obtain      Review of Systems     Unable to obtain due to AMS      Past Medical History: Patient has a past medical history of CAD (coronary artery disease) (8/12/2014), Carotid artery disease, Congestive heart failure, unspecified, Encounter for blood transfusion, Hemangioma (4/2/2013), Hyperlipidemia, Hypertension, Hypothyroidism (11/27/2020), Peripheral vascular disease, and Syncope and collapse.    Past Surgical History: Patient has a past surgical history that includes cataracts; Cardiac catheterization; and Coronary angioplasty.    Social History: Patient reports that she quit smoking about 25 years ago. " She has a 5.00 pack-year smoking history. She does not have any smokeless tobacco history on file. She reports that she does not drink alcohol or use drugs.    Family History: family history includes Anuerysm in her sister; Hypertension in her father and mother; No Known Problems in her brother, maternal aunt, maternal grandfather, maternal grandmother, maternal uncle, paternal aunt, paternal grandfather, paternal grandmother, paternal uncle, and sister.    Medications: Scheduled Meds:   [START ON 11/28/2020] aspirin  81 mg Oral Daily    atorvastatin  80 mg Oral QHS    cefTRIAXone (ROCEPHIN) IVPB  2 g Intravenous ED 1 Time    [START ON 11/28/2020] cefTRIAXone (ROCEPHIN) IVPB  1 g Intravenous Q24H    [START ON 11/28/2020] clopidogreL  75 mg Oral Daily    [START ON 11/28/2020] ferrous sulfate  325 mg Oral Daily    lactated ringers  500 mL Intravenous ED 1 Time    [START ON 11/28/2020] levothyroxine  50 mcg Oral Before breakfast    [START ON 11/28/2020] metoprolol succinate  25 mg Oral Daily    [START ON 11/28/2020] polyethylene glycol  17 g Oral Daily    senna-docusate 8.6-50 mg  1 tablet Oral BID     Continuous Infusions:   sodium chloride 0.9%       PRN Meds:.acetaminophen, dextrose 50%, dextrose 50%, glucagon (human recombinant), glucose, glucose, melatonin, nitroGLYCERIN, ondansetron, sodium chloride 0.9%    Allergies: Patient is allergic to pollen extracts.    Physical Exam:     Vital Signs (Most Recent):  Temp: 97.7 °F (36.5 °C) (11/27/20 1114)  Pulse: 109 (11/27/20 1555)  Resp: 16 (11/27/20 1555)  BP: 135/60 (11/27/20 1555)  SpO2: 100 % (11/27/20 1409) Vital Signs Range (Last 24H):  Temp:  [97.7 °F (36.5 °C)]   Pulse:  [100-109]   Resp:  [16-33]   BP: (107-135)/(52-60)   SpO2:  [99 %-100 %]    Body mass index is 30.18 kg/m².     Physical Exam:  Constitutional: Appears well-developed and well-nourished. altered, but answers some questions, some likely is volitional.  Head: Normocephalic and  atraumatic.   Mouth/Throat: Oropharynx is clear and moist.   Eyes: EOM are normal. Pupils are equal, round, and reactive to light. No scleral icterus.   Neck: Normal range of motion. Neck supple.   Cardiovascular: Normal rate and regular rhythm.  No murmur heard.  Pulmonary/Chest: Effort normal and breath sounds normal. No respiratory distress. No wheezes, rales, or rhonchi  Abdominal: Soft. Bowel sounds are normal.  No distension or tenderness  Musculoskeletal: Normal range of motion. No edema. Mild pain with deep palpation of ankles but non focal.  Neurological: Alert and oriented to person, place, and time, hospital, year, situation upon extreme prompting. Follows simple commands.  Skin: Skin is warm and dry. Some bruising seen on extremities.   Psychiatric: not baseline likely, refuses to answer some questions and is altered likely also.  Vitals reviewed.    Recent Labs   Lab 11/27/20  1232   WBC 8.91   HGB 9.9*   HCT 32.9*          Recent Labs   Lab 11/27/20  1232      K 4.3      CO2 20*   BUN 63*   CREATININE 3.0*      CALCIUM 9.7   MG 1.9   PHOS 4.3     Recent Labs   Lab 11/27/20  1232   ALKPHOS 109   ALT 25   AST 37   ALBUMIN 2.7*   PROT 7.4   BILITOT 1.2*   INR 1.2      No results for input(s): POCTGLUCOSE in the last 168 hours.      Assessment and Plan:     Ms. Ciara Lozano is a 85 y.o. female who presented to [facility] on 11/27/2020 with     Sepsis secondary to acute cystitis  Septic encephalopathy  -UA with leukos, blood, many bacteria  -non septic on exam, WBC 8  -lactic 1.8, procal ordered  -on rocephin now, no CX with growth in past  -blood CX pending and urine CX    Acute metabolic encephalopathy  -likely from sepsis as well as mild uremia bun 63, baesline 20s from JOHNNY  -IVF to treat JOHNNY and also may need cervantes if retains urine furhter as FEna compatible with post renal  -treat UTI  -trend    hypothyrooidism  Not on meds it apears  -tsh low, free t4 wnl but given confusion  will treat now. Repeat 6 weeks    HTN  -resume home meds in AM if BP stable, 110s now systolic  -hold arb for yuliya    Chronic systolic/diastolic heart failure  -not overloaded now, cxr clear  -hold lasix, arb for yuliya  -light ivf, if resp issues stop (12 hours ivf)  -on room air    Acute kidney injury  -baseline 1 and at 3 now  -fena appears post obstr but on lasix, feUrea ordered  -UTI also contributor  -if retains more overnight, put cervantes in  -US ordered  -treat UTI    Anemia of chronic renal disease  -hg 7 in the past, is 9.9 now  -check iron studies    Elevated troponin  -chronic issue  -trop .164. repeat 8 pm. 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

## 2020-11-27 NOTE — ED NOTES
Report called to Khadra on 11th floor -----------------------------------------------------------going to 115

## 2020-11-27 NOTE — ED PROVIDER NOTES
Encounter Date: 11/27/2020       History     Chief Complaint   Patient presents with    Altered Mental Status     family states she is more letharigic than normal, found laying on ground by family, no trauma. Only one to two word answers, normally AAOx 4    Urinary Tract Infection     foul smelling urine      HPI     86 yo F with pmh of CAD, CHF (EF <20% recently), HTN, HLD, hemangioma, PVD, presenting with AMS. Pt recently at [facility], admission from 11/8-11/20 for acute respiratory failure, intubated, NSTEMI with acute encephalopathy likely vascular, Afib with RVR, CVA, JOHNNY, anemia requiring transfusion. MRA head and neck, CTH all without acute findings. Per family report, pt was more lethargic and found laying on ground without signs of trauma, smelling of urine. No family at bedside or answering phone for collateral. Pt A&Ox1, not providing hx.       Review of patient's allergies indicates:   Allergen Reactions    Pollen extracts      Past Medical History:   Diagnosis Date    CAD (coronary artery disease) 8/12/2014    Carotid artery disease     Congestive heart failure, unspecified     Coronary artery disease involving native coronary artery of native heart without angina pectoris 8/12/2014    Encounter for blood transfusion     Essential hypertension 11/19/2012    Hemangioma 4/2/2013    Hyperlipidemia     Hypertension     Hypothyroidism 11/27/2020    Peripheral vascular disease     Syncope and collapse      Past Surgical History:   Procedure Laterality Date    CARDIAC CATHETERIZATION      cataracts      CORONARY ANGIOPLASTY       Family History   Problem Relation Age of Onset    Hypertension Mother     Hypertension Father     Anuerysm Sister     No Known Problems Maternal Grandmother     No Known Problems Maternal Grandfather     No Known Problems Paternal Grandmother     No Known Problems Paternal Grandfather     No Known Problems Brother     No Known Problems Sister     No Known  Problems Maternal Aunt     No Known Problems Maternal Uncle     No Known Problems Paternal Aunt     No Known Problems Paternal Uncle     Anemia Neg Hx     Arrhythmia Neg Hx     Asthma Neg Hx     Clotting disorder Neg Hx     Fainting Neg Hx     Heart attack Neg Hx     Heart disease Neg Hx     Heart failure Neg Hx     Hyperlipidemia Neg Hx      Social History     Tobacco Use    Smoking status: Former Smoker     Packs/day: 0.25     Years: 20.00     Pack years: 5.00     Quit date: 1995     Years since quittin.0   Substance Use Topics    Alcohol use: No     Alcohol/week: 0.0 standard drinks     Comment: h/o regular ETOH use, now drinks only on holidays    Drug use: No     Review of Systems   Unable to perform ROS: Mental status change       Physical Exam     Initial Vitals [20 1114]   BP Pulse Resp Temp SpO2   (!) 116/52 100 (!) 33 97.7 °F (36.5 °C) 99 %      MAP       --         Physical Exam    Constitutional: She appears well-developed. She is not diaphoretic. No distress.   HENT:   Head: Normocephalic and atraumatic.   GCS15, answering some questions. Seems altered, with some volitional resistance to questions   Eyes: Pupils are equal, round, and reactive to light.   3 mm bl reactive   Neck: Normal range of motion.   Cardiovascular: Normal rate, regular rhythm, normal heart sounds and intact distal pulses.   No murmur heard.  Pulmonary/Chest: Breath sounds normal. No respiratory distress.   Not visually tachypneic   Abdominal: Soft. Bowel sounds are normal. She exhibits no distension. There is no abdominal tenderness.   Musculoskeletal: Normal range of motion. No edema.      Comments: No spinal step offs, deformity, back lesions or rashes, no sacral ulcers, no bruising.  RLE>LLE   Neurological:   Sleepy, confused. A&Ox1, states year and name, but won't say more than    Skin: Skin is warm. Capillary refill takes less than 2 seconds.         ED Course   Procedures  Labs Reviewed    COMPREHENSIVE METABOLIC PANEL - Abnormal; Notable for the following components:       Result Value    CO2 20 (*)     BUN 63 (*)     Creatinine 3.0 (*)     Albumin 2.7 (*)     Total Bilirubin 1.2 (*)     eGFR if  15.8 (*)     eGFR if non  13.7 (*)     All other components within normal limits   B-TYPE NATRIURETIC PEPTIDE - Abnormal; Notable for the following components:    BNP 1,872 (*)     All other components within normal limits   CBC W/ AUTO DIFFERENTIAL - Abnormal; Notable for the following components:    RBC 3.79 (*)     Hemoglobin 9.9 (*)     Hematocrit 32.9 (*)     MCH 26.1 (*)     MCHC 30.1 (*)     RDW 21.9 (*)     Gran # (ANC) 7.9 (*)     Lymph # 0.5 (*)     Gran % 88.5 (*)     Lymph % 5.3 (*)     All other components within normal limits   TSH - Abnormal; Notable for the following components:    TSH 0.124 (*)     All other components within normal limits   PROTIME-INR - Abnormal; Notable for the following components:    Prothrombin Time 12.9 (*)     All other components within normal limits   TROPONIN I - Abnormal; Notable for the following components:    Troponin I 0.165 (*)     All other components within normal limits   URINALYSIS, REFLEX TO URINE CULTURE - Abnormal; Notable for the following components:    Appearance, UA Cloudy (*)     Protein, UA 2+ (*)     Occult Blood UA 1+ (*)     Leukocytes, UA 3+ (*)     All other components within normal limits    Narrative:     Specimen Source->Urine   OSMOLALITY, SERUM - Abnormal; Notable for the following components:    Osmolality 305 (*)     All other components within normal limits   PROCALCITONIN - Abnormal; Notable for the following components:    Procalcitonin 1.92 (*)     All other components within normal limits   URINALYSIS MICROSCOPIC - Abnormal; Notable for the following components:    RBC, UA 24 (*)     WBC, UA >100 (*)     Bacteria Many (*)     All other components within normal limits    Narrative:     Specimen  Source->Urine   PROTEIN / CREATININE RATIO, URINE - Abnormal; Notable for the following components:    Protein, Urine Random 390 (*)     Prot/Creat Ratio, Urine 8.86 (*)     All other components within normal limits    Narrative:     Specimen Source->Urine   DRUG SCREEN PANEL, URINE EMERGENCY    Narrative:     Specimen Source->Urine   LACTIC ACID, PLASMA   MAGNESIUM   PHOSPHORUS   T4, FREE   APTT   CREATININE, URINE, RANDOM    Narrative:     Specimen Source->Urine   SODIUM, URINE, RANDOM    Narrative:     Specimen Source->Urine   CK   FERRITIN   VITAMIN B12   FOLATE   UREA NITROGEN, URINE, RANDOM   SARS-COV-2 RDRP GENE    Narrative:     This test utilizes isothermal nucleic acid amplification   technology to detect the SARS-CoV-2 RdRp nucleic acid segment.   The analytical sensitivity (limit of detection) is 125 genome   equivalents/mL.   A POSITIVE result implies infection with the SARS-CoV-2 virus;   the patient is presumed to be contagious.     A NEGATIVE result means that SARS-CoV-2 nucleic acids are not   present above the limit of detection. A NEGATIVE result should be   treated as presumptive. It does not rule out the possibility of   COVID-19 and should not be the sole basis for treatment decisions.   If COVID-19 is strongly suspected based on clinical and exposure   history, re-testing using an alternate molecular assay should be   considered.   This test is only for use under the Food and Drug   Administration s Emergency Use Authorization (EUA).   Commercial kits are provided by [employer].   Performance characteristics of the EUA have been independently   verified by [facility] Department of   Pathology and Laboratory Medicine.   _________________________________________________________________   The authorized Fact Sheet for Healthcare Providers and the authorized Fact   Sheet for Patients of the ID NOW COVID-19 are available on the FDA   website:      https://www.fda.gov/media/999920/download  https://www.fda.gov/media/524372/download            ECG Results    None       Imaging Results          US Retroperitoneal Complete (Final result)  Result time 11/27/20 17:50:46   Procedure changed from US Kidney     Final result by Peter Haynes MD (11/27/20 17:50:46)                 Impression:      Mild hydronephrosis bilaterally.  Ureteral jets not able to visualized due to layering echogenicity within the bladder.  Similar hydronephrosis seen on CT 08/28/2014 possibly representing chronic process however cannot entirely exclude acute ureteral obstruction.    Elevated right renal resistive index which is nonspecific and could be seen in setting of medical renal disease.    Layering heterogenous mass within the bladder likely representing bladder hematoma, however other etiologies cannot be entirely excluded.    Bilateral simple renal cysts.    Electronically signed by resident: Ramon Chavarria  Date:    11/27/2020  Time:    17:38    Electronically signed by: Peter Haynes MD  Date:    11/27/2020  Time:    17:50             Narrative:    EXAMINATION:  US RETROPERITONEAL COMPLETE    CLINICAL HISTORY:  assess for obstruction with signs of this on labs and urine retention/JOHNNY on admission;    TECHNIQUE:  Ultrasound of the kidneys and urinary bladder was performed including color flow and Doppler evaluation of the kidneys.    COMPARISON:  CT 08/28/2014    FINDINGS:  Right kidney: The right kidney measures 11.3 cm. No cortical thinning. No loss of corticomedullary distinction. Resistive index measures 0.89.  No solid mass.  There is a 3.8 x 3.3 x 4.0 cm simple cyst in the inferior pole.  No renal stone. Mild hydronephrosis.    Left kidney: The left kidney measures 11.0 cm. No cortical thinning. No loss of corticomedullary distinction. Resistive index measures 0.71.  No solid mass.  There is subcentimeter simple cyst in the midpole.  No renal stone. Mild  hydronephrosis.    Heterogenous, mostly isoechoic layering mass within the bladder without internal Doppler vascularity likely representing bladder hematoma.                               CT Head Without Contrast (Final result)  Result time 11/27/20 12:58:46    Final result by Alonzo De Luna MD (11/27/20 12:58:46)                 Impression:      1. No acute intracranial findings by noncontrast CT.  2. Remote ischemic change, as described.  Progression of cerebral parenchymal volume loss relative to remote CT of 10/26/2012.      Electronically signed by: Alonzo De Luna  Date:    11/27/2020  Time:    12:58             Narrative:    EXAMINATION:  CT HEAD WITHOUT CONTRAST    CLINICAL HISTORY:  Altered mental status;    TECHNIQUE:  Low dose axial images were obtained through the head.  Coronal and sagittal reformations were also performed. Contrast was not administered.    COMPARISON:  CT head performed 10/26/2012    FINDINGS:  Blood: No acute intracranial hemorrhage.    Parenchyma: No definite loss of gray-white differentiation to suggest acute or subacute transcortical infarct. Right parieto-occipital encephalomalacia and gliosis is again noted.  Extensive white matter hypoattenuation suggests sequela of chronic small vessel ischemic disease.  Remote ischemic insults are again noted involving the deep gray nuclei.  Multiple small remote infarcts in the cerebellum.  Generalized parenchymal volume loss is noted, progressed relative to remote CT performed 10/26/2012.    Ventricles/Extra-axial spaces: No abnormal extra-axial fluid collection. Basal cisterns are patent.    Vessels: Calcific atherosclerosis    Orbits: Status post bilateral lens replacements    Scalp: Unremarkable.    Skull: There are no depressed skull fractures or destructive bone lesions.    Sinuses and mastoids: Trace mucosal thickening.  Pneumatized middle turbinates.    Other findings: None                               X-Ray Chest AP Portable  (Final result)  Result time 11/27/20 12:40:07    Final result by Sandro Dewitt MD (11/27/20 12:40:07)                 Impression:      Cardiomegaly, probably unchanged.  Poor inspiration with minimal scarring/atelectasis at the right base.      Electronically signed by: Sandro Dewitt MD  Date:    11/27/2020  Time:    12:40             Narrative:    EXAMINATION:  XR CHEST AP PORTABLE    CLINICAL HISTORY:  ams;    TECHNIQUE:  Single frontal view of the chest was performed.    COMPARISON:  03/05/2020    FINDINGS:  The patient is rotated toward the CHELLE projection.  Poor inspiration and portable technique accentuate the cardiac silhouette.  The heart appears enlarged but similar in size and configuration when compared to the previous study.  Tortuous thoracic aorta and brachiocephalic vessels with atherosclerotic calcification in the wall of the aortic arch.  Pulmonary vascularity does not appear congested.  Overall volume loss of both lungs.  Minimal scarring/atelectasis at the right base.  No airspace consolidation or pleural fluid.  No pneumothorax.  Skeletal structures appear intact.                                 Medical Decision Making:   History:   Old Medical Records: I decided to obtain old medical records.  Old Records Summarized: records from another hospital.       <> Summary of Records: Recently admitted at [facility]  Initial Assessment:   86 yo F with pmh of CAD, CHF (EF 30% recently), HTN, HLD, hemangioma, PVD, presenting with AMS.  Differential Diagnosis:   Sepsis, UTI, stroke, CHF exacerbation with hypoxia, medication overuse, toxin ingestion, seizure, ammonemia/cirrhosis, pna.   Independently Interpreted Test(s):   I have ordered and independently interpreted EKG Reading(s) - see prior notes  Clinical Tests:   Lab Tests: Ordered and Reviewed  Radiological Study: Ordered and Reviewed  Medical Tests: Ordered and Reviewed  ED Management:  CBC, CMP, lactate, Blood cx x2, UA with cath, BNP, trop, coags, thyroid,  COVID testing  EKG with similar LBBB, HR 99, no clear stelevation or depression, , Qtc 488.   CXR non-concerning  CTH for AMS ordered: negative for acute bleed    Radha Sierra MD PGY3   [facility]  12:32 PM 11/27/2020    Update:   Pt admitted to medicine for AMS and UTI with JOHNNY (Cr 3.0 from baseline 1.5)      Other:   I have discussed this case with another health care provider.       <> Summary of the Discussion: Hospital medicine              Attending Attestation:   Physician Attestation Statement for Resident:  As the supervising MD   Physician Attestation Statement: I have personally seen and examined this patient.   I agree with the above history. -:   As the supervising MD I agree with the above PE.    As the supervising MD I agree with the above treatment, course, plan, and disposition.  I have reviewed and agree with the residents interpretation of the following: lab data, EKG, x-rays and CT scans.  I have reviewed the following: old records at this facility.        Attending Critical Care:   Critical Care Times:   ==============================================================  · Total Critical Care Time - exclusive of procedural time: 40 minutes.  ==============================================================  Critical care was necessary to treat or prevent imminent or life-threatening deterioration of the following conditions: sepsis.   Critical care was time spent personally by me on the following activities: obtaining history from patient or relative, examination of patient, review of old charts, ordering lab, x-rays, and/or EKG, development of treatment plan with patient or relative, ordering and performing treatments and interventions, evaluation of patient's response to treatment, interpretation of cardiac measurements and re-evaluation of patient's conition.   Critical Care Condition: potentially life-threatening               ED Course as of Dec 07 2329   Fri Nov 27,  2020   1342 JOHNNY   Creatinine(!): 3.0 [NP]   1355 Trop downtrending from 11/20 0.48   Troponin I(!): 0.165 [NP]   1502 Urine foul smelling, 1200 cc per nursing. Rocephin ordered    [NP]   1624 Bacteria, UA(!): Many [NP]   1624 WBC, UA(!): >100 [NP]      ED Course User Index  [NP] Radha Sierra MD            Clinical Impression:     ICD-10-CM ICD-9-CM   1. Sepsis, due to unspecified organism, unspecified whether acute organ dysfunction present  A41.9 038.9     995.91   2. AMS (altered mental status)  R41.82 780.97   3. Acute cystitis without hematuria  N30.00 595.0   4. JOHNNY (acute kidney injury)  N17.9 584.9   5. Heart failure, unspecified HF chronicity, unspecified heart failure type  I50.9 428.9   6. Troponin level elevated  R77.8 790.6   7. Sepsis  A41.9 038.9     995.91   8. Urinary retention  R33.9 788.20   9. Coronary artery disease involving native coronary artery of native heart without angina pectoris  I25.10 414.01   10. Chronic combined systolic and diastolic heart failure  I50.42 428.42   11. Sepsis without acute organ dysfunction, due to unspecified organism  A41.9 038.9     995.91   12. Bacteremia due to Klebsiella pneumoniae  R78.81 790.7    B96.1 041.3   13. Klebsiella pneumoniae infection  A49.8 041.3   14. Septic encephalopathy  G93.41 348.31   15. Other hydronephrosis  N13.39 591   16. Essential hypertension  I10 401.9   17. Memory deficits  R41.3 780.93   18. Physical deconditioning  R53.81 799.3   19. Acquired hypothyroidism  E03.9 244.9   20. Anemia of chronic disease  D63.8 285.29   21. Chest pain  R07.9 786.50   22. Sepsis without acute organ dysfunction  A41.9 038.9     995.91                          ED Disposition Condition    Admit                             Radha Sierra MD  Resident  11/27/20 1938       Bharat Villegas MD  12/07/20 1207

## 2020-11-28 LAB
ACANTHOCYTES BLD QL SMEAR: PRESENT
ALBUMIN SERPL BCP-MCNC: 2 G/DL (ref 3.5–5.2)
ALP SERPL-CCNC: 99 U/L (ref 55–135)
ALT SERPL W/O P-5'-P-CCNC: 24 U/L (ref 10–44)
ANION GAP SERPL CALC-SCNC: 13 MMOL/L (ref 8–16)
ANISOCYTOSIS BLD QL SMEAR: SLIGHT
AST SERPL-CCNC: 30 U/L (ref 10–40)
BASOPHILS # BLD AUTO: 0.03 K/UL (ref 0–0.2)
BASOPHILS NFR BLD: 0.3 % (ref 0–1.9)
BILIRUB SERPL-MCNC: 0.7 MG/DL (ref 0.1–1)
BUN SERPL-MCNC: 66 MG/DL (ref 8–23)
BURR CELLS BLD QL SMEAR: ABNORMAL
CALCIUM SERPL-MCNC: 8.7 MG/DL (ref 8.7–10.5)
CHLORIDE SERPL-SCNC: 111 MMOL/L (ref 95–110)
CO2 SERPL-SCNC: 15 MMOL/L (ref 23–29)
CREAT SERPL-MCNC: 2.5 MG/DL (ref 0.5–1.4)
DIFFERENTIAL METHOD: ABNORMAL
EOSINOPHIL # BLD AUTO: 0 K/UL (ref 0–0.5)
EOSINOPHIL NFR BLD: 0 % (ref 0–8)
ERYTHROCYTE [DISTWIDTH] IN BLOOD BY AUTOMATED COUNT: 21.6 % (ref 11.5–14.5)
EST. GFR  (AFRICAN AMERICAN): 19.7 ML/MIN/1.73 M^2
EST. GFR  (NON AFRICAN AMERICAN): 17.1 ML/MIN/1.73 M^2
GLUCOSE SERPL-MCNC: 103 MG/DL (ref 70–110)
HCT VFR BLD AUTO: 28.9 % (ref 37–48.5)
HGB BLD-MCNC: 8.1 G/DL (ref 12–16)
IMM GRANULOCYTES # BLD AUTO: 0.04 K/UL (ref 0–0.04)
IMM GRANULOCYTES NFR BLD AUTO: 0.4 % (ref 0–0.5)
LACTATE SERPL-SCNC: 1.2 MMOL/L (ref 0.5–2.2)
LYMPHOCYTES # BLD AUTO: 0.6 K/UL (ref 1–4.8)
LYMPHOCYTES NFR BLD: 6.6 % (ref 18–48)
MAGNESIUM SERPL-MCNC: 2.5 MG/DL (ref 1.6–2.6)
MCH RBC QN AUTO: 25.8 PG (ref 27–31)
MCHC RBC AUTO-ENTMCNC: 28 G/DL (ref 32–36)
MCV RBC AUTO: 92 FL (ref 82–98)
MONOCYTES # BLD AUTO: 0.5 K/UL (ref 0.3–1)
MONOCYTES NFR BLD: 5.5 % (ref 4–15)
NEUTROPHILS # BLD AUTO: 8.4 K/UL (ref 1.8–7.7)
NEUTROPHILS NFR BLD: 87.2 % (ref 38–73)
NRBC BLD-RTO: 0 /100 WBC
OVALOCYTES BLD QL SMEAR: ABNORMAL
PHOSPHATE SERPL-MCNC: 4.2 MG/DL (ref 2.7–4.5)
PLATELET # BLD AUTO: 206 K/UL (ref 150–350)
PLATELET BLD QL SMEAR: ABNORMAL
PMV BLD AUTO: 11.7 FL (ref 9.2–12.9)
POIKILOCYTOSIS BLD QL SMEAR: SLIGHT
POTASSIUM SERPL-SCNC: 3.6 MMOL/L (ref 3.5–5.1)
PROT SERPL-MCNC: 5.6 G/DL (ref 6–8.4)
RBC # BLD AUTO: 3.14 M/UL (ref 4–5.4)
SCHISTOCYTES BLD QL SMEAR: PRESENT
SODIUM SERPL-SCNC: 139 MMOL/L (ref 136–145)
WBC # BLD AUTO: 9.67 K/UL (ref 3.9–12.7)

## 2020-11-28 PROCEDURE — 99233 PR SUBSEQUENT HOSPITAL CARE,LEVL III: ICD-10-PCS | Mod: ,,, | Performed by: HOSPITALIST

## 2020-11-28 PROCEDURE — 63600175 PHARM REV CODE 636 W HCPCS: Performed by: PHYSICIAN ASSISTANT

## 2020-11-28 PROCEDURE — 25000003 PHARM REV CODE 250: Performed by: HOSPITALIST

## 2020-11-28 PROCEDURE — 87040 BLOOD CULTURE FOR BACTERIA: CPT

## 2020-11-28 PROCEDURE — 80053 COMPREHEN METABOLIC PANEL: CPT

## 2020-11-28 PROCEDURE — 36415 COLL VENOUS BLD VENIPUNCTURE: CPT

## 2020-11-28 PROCEDURE — 20600001 HC STEP DOWN PRIVATE ROOM

## 2020-11-28 PROCEDURE — 83605 ASSAY OF LACTIC ACID: CPT

## 2020-11-28 PROCEDURE — 94761 N-INVAS EAR/PLS OXIMETRY MLT: CPT

## 2020-11-28 PROCEDURE — 85025 COMPLETE CBC W/AUTO DIFF WBC: CPT

## 2020-11-28 PROCEDURE — 84100 ASSAY OF PHOSPHORUS: CPT

## 2020-11-28 PROCEDURE — 83735 ASSAY OF MAGNESIUM: CPT

## 2020-11-28 PROCEDURE — 99233 SBSQ HOSP IP/OBS HIGH 50: CPT | Mod: ,,, | Performed by: HOSPITALIST

## 2020-11-28 RX ORDER — SODIUM CHLORIDE 9 MG/ML
INJECTION, SOLUTION INTRAVENOUS CONTINUOUS
Status: ACTIVE | OUTPATIENT
Start: 2020-11-28 | End: 2020-11-29

## 2020-11-28 RX ADMIN — CEFTRIAXONE 2 G: 2 INJECTION, SOLUTION INTRAVENOUS at 04:11

## 2020-11-28 RX ADMIN — SODIUM CHLORIDE: 0.9 INJECTION, SOLUTION INTRAVENOUS at 12:11

## 2020-11-28 RX ADMIN — SODIUM CHLORIDE 250 ML: 0.9 INJECTION, SOLUTION INTRAVENOUS at 01:11

## 2020-11-28 RX ADMIN — SODIUM CHLORIDE 250 ML: 0.9 INJECTION, SOLUTION INTRAVENOUS at 11:11

## 2020-11-28 RX ADMIN — LEVOTHYROXINE SODIUM 50 MCG: 50 TABLET ORAL at 06:11

## 2020-11-28 RX ADMIN — SODIUM CHLORIDE: 0.9 INJECTION, SOLUTION INTRAVENOUS at 11:11

## 2020-11-28 NOTE — PLAN OF CARE
Patient remained free of falls and injuries during shift.  Patient took medications without incident. Patient had lowered blood pressures during shift otherwise vitals where stable.   No other concerns noted.

## 2020-11-28 NOTE — NURSING
Pt admitted to floor, vital signs obtained. Morelos inserted per orders d/t retention. Yellow sediment urine noted. Bed low and locked and call light in reach.

## 2020-11-28 NOTE — PT/OT/SLP PROGRESS
Occupational Therapy      Patient Name:  Ciara Lozano   MRN:  [id number]    Patient not seen today secondary to (attempted in morning however pt with rapid called, re-attempted eval in afternoon however hold per RN due to decreasing BP.). Will follow-up 11/29/2020.    MADAN Mcclain  11/28/2020

## 2020-11-28 NOTE — CODE/ RAPID DOCUMENTATION
"RAPID RESPONSE NURSE NOTE     Admit Date: 2020  LOS: 1  Code Status: Full Code   Date of Consult: 2020  : 1935  Age: 85 y.o.  Weight:   Wt Readings from Last 1 Encounters:   20 67.6 kg (149 lb 0.5 oz)     Sex: female  Race: Black or    Bed: [address] [place] [address] A:   MRN: [id number]  Time Rapid Response Team page Received: 10:57  Time Rapid Response Team at Bedside: 11:00  Time Rapid Response Team left Bedside: 11:07  Was the patient discharged from an ICU this admission?   no  Was the patient discharged from a PACU within last 24 hours?  no  Did the patient receive conscious sedation/general anesthesia within last 24 hours?  no  Was the patient in the ED within the past 24 hours?  yes  Was the patient started on NIPPV within the past 24 hours?  no  Did this progress into an ARC or CPA:  no  Attending Physician: Annel Sanchez MD  Primary Service: [facility] MED K  Consult Requested By: Annel Sanchez MD     SITUATION     Notified by code pager.  Reason for alert: difficult to arouse  Called to evaluate the patient for Neuro    BACKGROUND     Why is the patient in the hospital?: Acute cystitis    Patient has a past medical history of CAD (coronary artery disease), Carotid artery disease, Congestive heart failure, unspecified, Encounter for blood transfusion, Hemangioma, Hyperlipidemia, Hypertension, Hypothyroidism, Peripheral vascular disease, and Syncope and collapse.    ASSESSMENT/INTERVENTIONS     BP (!) 96/41   Pulse 92   Temp 97.8 °F (36.6 °C) (Oral)   Resp (!) 26   Ht 5' 2" (1.575 m)   Wt 67.6 kg (149 lb 0.5 oz)   LMP  (LMP Unknown)   SpO2 96%   BMI 27.26 kg/m²     What did you find: The patient was awake and responsive to tactile and verbal stimulation. Dr. Sanchez was at bedside during rapid response and stated that she felt the patient was at baseline mental status compared to admission. IV obtained by rapid response team as previous IV's were not patent per " primary RN. VSS, IVF bolus of 250 mL pending, and patient already receiving IV abx.     RECOMMENDATIONS     We recommend: continue current plan of care    FOLLOW-UP/CONTINGENCY PLAN     Call the Rapid Response Nurse, Delmer Desir RN at x [phone] for additional questions or concerns.    PHYSICIAN ESCALATION     Orders received and case discussed with Dr. Sanchez\.    Disposition: Remain in room 1157.

## 2020-11-28 NOTE — NURSING
Multiple attempts to rouse patient unsuccessful, including sternal rub. Manual pressure obtained as 90/68, consistent with ViSi readings.  Called Rapid Response nurse for eval. ABGs ordered.  Maintenance fluids already on MAR attempted to start, noted failed IV, IV start attempts in progress when Dr. Sanchez entered.  Discussing patient status with Dr. Sanchez when  charge nurse called for full Rapid. Rapid nurses placed IV to left antecubital. Dr. Sanchez ordered 250 CC saline bolus to be followed with maintenace fluids.  Bolus currently in progress.

## 2020-11-28 NOTE — PROGRESS NOTES
Progress Note  Hospital Medicine       Patient Name: Ciara Lozano  MRN:  [id number]  Hospital Medicine Team: [facility] HOSP MED K Annel Sanchez MD  Date of Admission:  11/27/2020     Principal Problem:  Acute cystitis   Primary Care Physician: Andreina Gonzales MD      Interval history       Seen early this aM and opened eyes but still sleep and around 750 so returned this afternoon around 11 am and nursing was unable to wake consistely so asked rapid team to come right as returning to reassess. Luckily patient aroused easily when they were replacing a blown IV before rapid came so they rapid was called off as she was even trying to get up in bed due to pain with IV stick. She was able to tell me year, city, hospital, name of hospital, situation. She has some dementia but is likely still recovering from septic and uremic encephalopathy, Cr improving today and IV blow delayed IV antibiotics this AM so likely will need more time to continue to improve. Bp on lower end as oral intake poor, bp meds still held as are asa and plavix for some hematuria with cateheter yesterday for now, on maintenance 100 cc given HF history to go slow, so will do a 250 bolus now to help improve BPS, 90s systolics during AM exam with nurse Dotty present, and then will run 12 hours of 100 cc/hr, if BP downticks may increase rate as not volume overloaded at all, laying flat, on room air, volume down but EF low so avoid overload. Repeat cho pending now. Mild trop up, chronic. No chest pain.  BM overnight        Review of Systems     Unable to obtain due to AMS      Past Medical History: Patient has a past medical history of CAD (coronary artery disease) (8/12/2014), Carotid artery disease, Congestive heart failure, unspecified, Encounter for blood transfusion, Hemangioma (4/2/2013), Hyperlipidemia, Hypertension, Hypothyroidism (11/27/2020), Peripheral vascular disease, and Syncope and collapse.    Past Surgical History: Patient has a past surgical  history that includes cataracts; Cardiac catheterization; and Coronary angioplasty.    Social History: Patient reports that she quit smoking about 25 years ago. She has a 5.00 pack-year smoking history. She does not have any smokeless tobacco history on file. She reports that she does not drink alcohol or use drugs.    Family History: family history includes Anuerysm in her sister; Hypertension in her father and mother; No Known Problems in her brother, maternal aunt, maternal grandfather, maternal grandmother, maternal uncle, paternal aunt, paternal grandfather, paternal grandmother, paternal uncle, and sister.    Medications: Scheduled Meds:   atorvastatin  80 mg Oral QHS    cefTRIAXone (ROCEPHIN) IVPB  2 g Intravenous Q24H    ferrous sulfate  325 mg Oral Daily    levothyroxine  50 mcg Oral Before breakfast    mupirocin   Nasal BID    polyethylene glycol  17 g Oral Daily    senna-docusate 8.6-50 mg  1 tablet Oral BID     Continuous Infusions:    PRN Meds:.acetaminophen, dextrose 50%, dextrose 50%, glucagon (human recombinant), glucose, glucose, melatonin, melatonin, nitroGLYCERIN, ondansetron, sodium chloride 0.9%, sodium chloride 0.9%    Allergies: Patient is allergic to pollen extracts.    Physical Exam:     Vital Signs (Most Recent):  Temp: 97.8 °F (36.6 °C) (11/28/20 0444)  Pulse: 96 (11/28/20 0259)  Resp: 17 (11/28/20 0259)  BP: (!) 113/58 (11/28/20 0259)  SpO2: 100 % (11/28/20 0259) Vital Signs Range (Last 24H):  Temp:  [97.6 °F (36.4 °C)-97.9 °F (36.6 °C)]   Pulse:  []   Resp:  [16-33]   BP: ()/(36-70)   SpO2:  [93 %-100 %]    Body mass index is 27.26 kg/m².     Physical Exam:  Constitutional: Appears well-developed and well-nourished. altered, but answers some questions, some likely is volitional. Awakens easily with needlestick and even tries to sit up in bed.  Head: Normocephalic and atraumatic.   Mouth/Throat: Oropharynx is clear and moist.   Eyes: EOM are normal. Pupils are equal,  round, and reactive to light. No scleral icterus.   Neck: Normal range of motion. Neck supple.   Cardiovascular: Normal rate and regular rhythm.  No murmur heard.  Pulmonary/Chest: Effort normal and breath sounds normal. No respiratory distress. No wheezes, rales, or rhonchi. .flat in bed on RA.  Abdominal: Soft. Bowel sounds are normal.  No distension or tenderness  : cervantes in place. Dark urine, some mild blood tinge.  Musculoskeletal: Normal range of motion. No edema. Mild pain with deep palpation of ankles but non focal.  Neurological: Alert and oriented to person, place, and time, hospital, year, situation when prompted. Can say its [facility], its 2020.. its [place]. Follows simple commands. Has to be prompted, still not baseline.  Skin: Skin is warm and dry. Some bruising seen on extremities.   Psychiatric: not baseline likely, refuses to answer some questions and is altered likely also.  Vitals reviewed.    Recent Labs   Lab 11/27/20  1232 11/28/20  0638   WBC 8.91 9.67   HGB 9.9* 8.1*   HCT 32.9* 28.9*    206       Recent Labs   Lab 11/27/20  1232 11/28/20  0638    139   K 4.3 3.6    111*   CO2 20* 15*   BUN 63* 66*   CREATININE 3.0* 2.5*    103   CALCIUM 9.7 8.7   MG 1.9 2.5   PHOS 4.3 4.2     Recent Labs   Lab 11/27/20  1232 11/28/20  0638   ALKPHOS 109 99   ALT 25 24   AST 37 30   ALBUMIN 2.7* 2.0*   PROT 7.4 5.6*   BILITOT 1.2* 0.7   INR 1.2  --       Recent Labs   Lab 11/27/20 2001 11/27/20 2119   POCTGLUCOSE 81 119*         Assessment and Plan:     Ms. Ciara Lozano is a 85 y.o. female who presented to [facility] on 11/27/2020 with     Sepsis secondary to acute cystitis  Septic encephalopathy  -UA with leukos, blood, many bacteria  -non septic on exam, WBC 8  -lactic 1.8, procal 1.9  -on rocephin now, no CX with growth in past  -blood CX pending and urine CX    Acute metabolic encephalopathy  -likely from sepsis as well as mild uremia bun 63, baseline 20s from JOHNNY  -IVF to  treat JOHNNY/cervantes placed for retention  -treat UTI  -trend. Improved this afternoon exam with prompting    hypothyrooidism  Not on meds it apears  -tsh low, free t4 wnl but given confusion will treat now. Repeat 6 weeks    HTN  -low BPs overnight likely from volume down. Holding home BP meds, light IVF continue this AM and a bolus x 1 and reassess. No volume oerload at all on exam.      Chronic systolic/diastolic heart failure  -not overloaded now, cxr clear  -hold lasix, arb for johnny  -light ivf- continue now for hypotension on admit and volume down,johnny.  -on room air  -repeat echo pending    Acute kidney injury  Hydronephrosis  Hematuria  -baseline 1 and at 3 now->2.5  -fena appears post obstr but on lasix, feUrea ordered  -UTI also contributor  -cervantes placed with clots/hematuria. Hold asa/plavix from home temporarily  -US showing mild hydro (Seen on prev US in past) as well as layering mass- likely blood, and on cervantes placement had hematuria  -treat UTI    Anemia of chronic renal disease  -hg 7 in the past, is 8-9 now. Hematuria on admit with some acute blood loss anemia not unexpected from this, hold asa/plavix in interim.  -iron studies wnl    Elevated troponin  -chronic issue  -trop .164 and trended downward.. echo pending . 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 iwll need steady state repeat    Memory issues  -unclear baseline, no family at bedside, will see if can find, reports lives alone    Deconditioning  -PT/OT ordered  -may benefit from SNF when med stable             Diet:  renal  DVT PPx:  ZORAIDA/SCDS      Disposition:  Improvement in Cr, mental status. PT recs    Likely stay through weekend and suspet needs placement for safety and  deconditioning    Will need renal f/u and uro likely for proteinuria and urinary retention

## 2020-11-28 NOTE — PT/OT/SLP PROGRESS
Physical Therapy      Patient Name:  Ciara Lozano   MRN:  [id number]    Patient not seen this date; pt with fluctuating medical status throughout the day. In PM, nurse requesting hold for therapy due to decreasing BP. 84/40 at 1342. PT to follow up tomorrow as appropriate.     LEILA KONG, PT

## 2020-11-28 NOTE — CARE UPDATE
"RAPID RESPONSE NURSE PROACTIVE ROUNDING NOTE     Time of Visit:      Admit Date: 2020  LOS: 0  Code Status: Full Code   Date of Visit: 2020  : 1935  Age: 85 y.o.  Sex: female  Race: Black or   Bed: [address] [place] [address] A:   MRN: [id number]  Was the patient discharged from an ICU this admission? no   Was the patient discharged from a PACU within last 24 hours?  no  Did the patient receive conscious sedation/general anesthesia in last 24 hours?  no  Was the patient in the ED within the past 24 hours?  yes  Was the patient started on NIPPV within the past 24 hours?  no  Attending Physician: Annel Sanchez MD  Primary Service: [facility]     ASSESSMENT     Notified by bedside RN via phone call.  Reason for alert: Bedside RN expressed concern regarding patient's mental status and "only responding to sternal rub, but tells you to leave her alone"    Diagnosis: Acute cystitis    Abnormal Vital Signs: BP (!) 92/38 (BP Location: Right arm, Patient Position: Lying)   Pulse 97   Temp 97.7 °F (36.5 °C)   Resp (!) 30   Ht 5' 2" (1.575 m)   Wt 67.8 kg (149 lb 7.6 oz)   LMP  (LMP Unknown)   SpO2 97%   BMI 27.34 kg/m²      Clinical Issues: Neuro and Circulatory    Patient  has a past medical history of CAD (coronary artery disease), Carotid artery disease, Congestive heart failure, unspecified, Encounter for blood transfusion, Hemangioma, Hyperlipidemia, Hypertension, Hypothyroidism, Peripheral vascular disease, and Syncope and collapse.    Upon my assessment, pt opens eyes spontaneously and oriented to person and place.  Pt moves all extremities.  Patient does close her eyes and acts like she can not hear you at times.    , SpO2 98% on RA, BP 99/43 (62), temp 96.4.    Chart check completed.  Pt received 500cc LR in ER prior to transfer to [facility].  Blood cultures and urine cultures collected earlier in day.    Currently getting NS @ 100cc/hr.    Last lactic acid 1.8 @ 1232. BUN 63, Cr " 3.0.  BNP 1872.  Troponin 0.165.    Current antibiotics: ceftriaxone       INTERVENTIONS/ RECOMMENDATIONS     Additional 500cc LR bolus ordered and infusing.    Continuous cardiac monitoring ordered.    Close monitoring of VS, respiratory status, and neuro status.    Strict intake and output.   Consider repeat lactic acid.  Follow up with recently collected troponin.  Maintain adequate IV access.      Discussed plan of care with RNDavi .    PHYSICIAN ESCALATION     Yes/No  yes    Orders received and case discussed with Dr. Harmon.    Disposition: Remain in room 1157.    FOLLOW-UP     Call back the Rapid Response Nurse, Maureen Velasquez RN at [phone] for additional questions or concerns.

## 2020-11-29 LAB
ACANTHOCYTES BLD QL SMEAR: PRESENT
ALBUMIN SERPL BCP-MCNC: 1.7 G/DL (ref 3.5–5.2)
ALP SERPL-CCNC: 97 U/L (ref 55–135)
ALT SERPL W/O P-5'-P-CCNC: 22 U/L (ref 10–44)
ANION GAP SERPL CALC-SCNC: 10 MMOL/L (ref 8–16)
ANISOCYTOSIS BLD QL SMEAR: SLIGHT
ASCENDING AORTA: 2.97 CM
AST SERPL-CCNC: 38 U/L (ref 10–40)
AV INDEX (PROSTH): 0.7
AV MEAN GRADIENT: 5 MMHG
AV PEAK GRADIENT: 7 MMHG
AV VALVE AREA: 2.49 CM2
AV VELOCITY RATIO: 0.68
BASOPHILS # BLD AUTO: 0.02 K/UL (ref 0–0.2)
BASOPHILS NFR BLD: 0.2 % (ref 0–1.9)
BILIRUB SERPL-MCNC: 0.4 MG/DL (ref 0.1–1)
BSA FOR ECHO PROCEDURE: 1.72 M2
BUN SERPL-MCNC: 67 MG/DL (ref 8–23)
BURR CELLS BLD QL SMEAR: ABNORMAL
CALCIUM SERPL-MCNC: 8.3 MG/DL (ref 8.7–10.5)
CHLORIDE SERPL-SCNC: 112 MMOL/L (ref 95–110)
CO2 SERPL-SCNC: 19 MMOL/L (ref 23–29)
CREAT SERPL-MCNC: 2 MG/DL (ref 0.5–1.4)
CV ECHO LV RWT: 0.41 CM
DIFFERENTIAL METHOD: ABNORMAL
DOP CALC AO PEAK VEL: 1.35 M/S
DOP CALC AO VTI: 20.23 CM
DOP CALC LVOT AREA: 3.6 CM2
DOP CALC LVOT DIAMETER: 2.13 CM
DOP CALC LVOT PEAK VEL: 0.92 M/S
DOP CALC LVOT STROKE VOLUME: 50.43 CM3
DOP CALCLVOT PEAK VEL VTI: 14.16 CM
E WAVE DECELERATION TIME: 234.65 MSEC
E/A RATIO: 0.79
E/E' RATIO: 17.45 M/S
ECHO LV POSTERIOR WALL: 1.2 CM (ref 0.6–1.1)
EOSINOPHIL # BLD AUTO: 0 K/UL (ref 0–0.5)
EOSINOPHIL NFR BLD: 0.3 % (ref 0–8)
ERYTHROCYTE [DISTWIDTH] IN BLOOD BY AUTOMATED COUNT: 21.2 % (ref 11.5–14.5)
EST. GFR  (AFRICAN AMERICAN): 25.9 ML/MIN/1.73 M^2
EST. GFR  (NON AFRICAN AMERICAN): 22.4 ML/MIN/1.73 M^2
FRACTIONAL SHORTENING: 12 % (ref 28–44)
GLUCOSE SERPL-MCNC: 88 MG/DL (ref 70–110)
HCT VFR BLD AUTO: 26.8 % (ref 37–48.5)
HGB BLD-MCNC: 7.8 G/DL (ref 12–16)
IMM GRANULOCYTES # BLD AUTO: 0.1 K/UL (ref 0–0.04)
IMM GRANULOCYTES NFR BLD AUTO: 1.1 % (ref 0–0.5)
INTERVENTRICULAR SEPTUM: 1.15 CM (ref 0.6–1.1)
IVRT: 98.95 MSEC
LA MAJOR: 6.48 CM
LA MINOR: 6.49 CM
LA WIDTH: 5.21 CM
LEFT ATRIUM SIZE: 4.72 CM
LEFT ATRIUM VOLUME INDEX: 80.4 ML/M2
LEFT ATRIUM VOLUME: 135.55 CM3
LEFT INTERNAL DIMENSION IN SYSTOLE: 5.17 CM (ref 2.1–4)
LEFT VENTRICLE DIASTOLIC VOLUME INDEX: 100.54 ML/M2
LEFT VENTRICLE DIASTOLIC VOLUME: 169.58 ML
LEFT VENTRICLE MASS INDEX: 174 G/M2
LEFT VENTRICLE SYSTOLIC VOLUME INDEX: 75.8 ML/M2
LEFT VENTRICLE SYSTOLIC VOLUME: 127.91 ML
LEFT VENTRICULAR INTERNAL DIMENSION IN DIASTOLE: 5.85 CM (ref 3.5–6)
LEFT VENTRICULAR MASS: 292.77 G
LV LATERAL E/E' RATIO: 12 M/S
LV SEPTAL E/E' RATIO: 32 M/S
LYMPHOCYTES # BLD AUTO: 0.7 K/UL (ref 1–4.8)
LYMPHOCYTES NFR BLD: 7.6 % (ref 18–48)
MAGNESIUM SERPL-MCNC: 1.7 MG/DL (ref 1.6–2.6)
MCH RBC QN AUTO: 25.2 PG (ref 27–31)
MCHC RBC AUTO-ENTMCNC: 29.1 G/DL (ref 32–36)
MCV RBC AUTO: 87 FL (ref 82–98)
MONOCYTES # BLD AUTO: 0.6 K/UL (ref 0.3–1)
MONOCYTES NFR BLD: 6.8 % (ref 4–15)
MV PEAK A VEL: 1.22 M/S
MV PEAK E VEL: 0.96 M/S
MV STENOSIS PRESSURE HALF TIME: 68.05 MS
MV VALVE AREA P 1/2 METHOD: 3.23 CM2
NEUTROPHILS # BLD AUTO: 7.8 K/UL (ref 1.8–7.7)
NEUTROPHILS NFR BLD: 84 % (ref 38–73)
NRBC BLD-RTO: 0 /100 WBC
OVALOCYTES BLD QL SMEAR: ABNORMAL
PHOSPHATE SERPL-MCNC: 4 MG/DL (ref 2.7–4.5)
PISA TR MAX VEL: 3.08 M/S
PLATELET # BLD AUTO: 197 K/UL (ref 150–350)
PLATELET BLD QL SMEAR: ABNORMAL
PMV BLD AUTO: 12.8 FL (ref 9.2–12.9)
POIKILOCYTOSIS BLD QL SMEAR: SLIGHT
POTASSIUM SERPL-SCNC: 3.4 MMOL/L (ref 3.5–5.1)
PROT SERPL-MCNC: 5.2 G/DL (ref 6–8.4)
RA MAJOR: 5.04 CM
RA PRESSURE: 3 MMHG
RA WIDTH: 3.1 CM
RBC # BLD AUTO: 3.09 M/UL (ref 4–5.4)
RIGHT VENTRICULAR END-DIASTOLIC DIMENSION: 3.23 CM
RV TISSUE DOPPLER FREE WALL SYSTOLIC VELOCITY 1 (APICAL 4 CHAMBER VIEW): 9.85 CM/S
SCHISTOCYTES BLD QL SMEAR: PRESENT
SINUS: 3.34 CM
SODIUM SERPL-SCNC: 141 MMOL/L (ref 136–145)
STJ: 2.82 CM
TDI LATERAL: 0.08 M/S
TDI SEPTAL: 0.03 M/S
TDI: 0.06 M/S
TR MAX PG: 38 MMHG
TRICUSPID ANNULAR PLANE SYSTOLIC EXCURSION: 1.81 CM
TV REST PULMONARY ARTERY PRESSURE: 41 MMHG
WBC # BLD AUTO: 9.24 K/UL (ref 3.9–12.7)

## 2020-11-29 PROCEDURE — 63600175 PHARM REV CODE 636 W HCPCS: Performed by: HOSPITALIST

## 2020-11-29 PROCEDURE — 25000003 PHARM REV CODE 250: Performed by: HOSPITALIST

## 2020-11-29 PROCEDURE — 97535 SELF CARE MNGMENT TRAINING: CPT

## 2020-11-29 PROCEDURE — 83735 ASSAY OF MAGNESIUM: CPT

## 2020-11-29 PROCEDURE — 80053 COMPREHEN METABOLIC PANEL: CPT

## 2020-11-29 PROCEDURE — 97166 OT EVAL MOD COMPLEX 45 MIN: CPT

## 2020-11-29 PROCEDURE — 20600001 HC STEP DOWN PRIVATE ROOM

## 2020-11-29 PROCEDURE — 84100 ASSAY OF PHOSPHORUS: CPT

## 2020-11-29 PROCEDURE — 99233 SBSQ HOSP IP/OBS HIGH 50: CPT | Mod: ,,, | Performed by: HOSPITALIST

## 2020-11-29 PROCEDURE — 99233 PR SUBSEQUENT HOSPITAL CARE,LEVL III: ICD-10-PCS | Mod: ,,, | Performed by: HOSPITALIST

## 2020-11-29 PROCEDURE — 85025 COMPLETE CBC W/AUTO DIFF WBC: CPT

## 2020-11-29 PROCEDURE — 97530 THERAPEUTIC ACTIVITIES: CPT

## 2020-11-29 PROCEDURE — 36415 COLL VENOUS BLD VENIPUNCTURE: CPT

## 2020-11-29 PROCEDURE — 97162 PT EVAL MOD COMPLEX 30 MIN: CPT

## 2020-11-29 RX ORDER — CEFEPIME HYDROCHLORIDE 2 G/1
2 INJECTION, POWDER, FOR SOLUTION INTRAVENOUS
Status: DISCONTINUED | OUTPATIENT
Start: 2020-11-29 | End: 2020-11-30

## 2020-11-29 RX ORDER — POTASSIUM CHLORIDE 20 MEQ/1
20 TABLET, EXTENDED RELEASE ORAL ONCE
Status: COMPLETED | OUTPATIENT
Start: 2020-11-29 | End: 2020-11-29

## 2020-11-29 RX ORDER — SODIUM CHLORIDE 9 MG/ML
INJECTION, SOLUTION INTRAVENOUS CONTINUOUS
Status: ACTIVE | OUTPATIENT
Start: 2020-11-29 | End: 2020-11-29

## 2020-11-29 RX ORDER — MAGNESIUM SULFATE HEPTAHYDRATE 40 MG/ML
2 INJECTION, SOLUTION INTRAVENOUS ONCE
Status: COMPLETED | OUTPATIENT
Start: 2020-11-29 | End: 2020-11-29

## 2020-11-29 RX ORDER — METOPROLOL TARTRATE 25 MG/1
12.5 TABLET ORAL 2 TIMES DAILY
Status: DISCONTINUED | OUTPATIENT
Start: 2020-11-29 | End: 2020-12-11 | Stop reason: HOSPADM

## 2020-11-29 RX ADMIN — DOCUSATE SODIUM 50MG AND SENNOSIDES 8.6MG 1 TABLET: 8.6; 5 TABLET, FILM COATED ORAL at 08:11

## 2020-11-29 RX ADMIN — METOPROLOL TARTRATE 12.5 MG: 25 TABLET, FILM COATED ORAL at 09:11

## 2020-11-29 RX ADMIN — METOPROLOL TARTRATE 12.5 MG: 25 TABLET, FILM COATED ORAL at 08:11

## 2020-11-29 RX ADMIN — MUPIROCIN: 20 OINTMENT TOPICAL at 08:11

## 2020-11-29 RX ADMIN — POLYETHYLENE GLYCOL 3350 17 G: 17 POWDER, FOR SOLUTION ORAL at 09:11

## 2020-11-29 RX ADMIN — MUPIROCIN: 20 OINTMENT TOPICAL at 09:11

## 2020-11-29 RX ADMIN — CEFEPIME 2 G: 2 INJECTION, POWDER, FOR SOLUTION INTRAVENOUS at 12:11

## 2020-11-29 RX ADMIN — LEVOTHYROXINE SODIUM 50 MCG: 50 TABLET ORAL at 06:11

## 2020-11-29 RX ADMIN — MAGNESIUM SULFATE IN WATER 2 G: 40 INJECTION, SOLUTION INTRAVENOUS at 09:11

## 2020-11-29 RX ADMIN — FERROUS SULFATE TAB EC 325 MG (65 MG FE EQUIVALENT) 325 MG: 325 (65 FE) TABLET DELAYED RESPONSE at 09:11

## 2020-11-29 RX ADMIN — SODIUM CHLORIDE: 0.9 INJECTION, SOLUTION INTRAVENOUS at 09:11

## 2020-11-29 RX ADMIN — DOCUSATE SODIUM 50MG AND SENNOSIDES 8.6MG 1 TABLET: 8.6; 5 TABLET, FILM COATED ORAL at 09:11

## 2020-11-29 RX ADMIN — ATORVASTATIN CALCIUM 80 MG: 20 TABLET, FILM COATED ORAL at 08:11

## 2020-11-29 RX ADMIN — POTASSIUM CHLORIDE 20 MEQ: 1500 TABLET, EXTENDED RELEASE ORAL at 09:11

## 2020-11-29 NOTE — CARE UPDATE
Rapid Response Nurse AI Alert     AI alert received, chart check completed abnormal VS noted. charge RNCarla contacted, no concerns verbalized at this time, instructed to call [phone] for further concerns or assistance.

## 2020-11-29 NOTE — NURSING
PT arrived to room 8088 @ appx 2315 with NAD noted. VSS.  PT only responds when she wants to . She did ask for water and drank appx 240cc with out difficulty. Assessment was done. NS infusing to left ac @ 100ml/hr without difficulty. PT was positioned and made comfortable. Bed low and locked with call light with in easy reach.

## 2020-11-29 NOTE — PLAN OF CARE
Problem: Occupational Therapy Goal  Goal: Occupational Therapy Goal  Description: Goals to be met by: 12/13/20     Patient will increase functional independence with ADLs by performing:    Feeding with Set-up Assistance.  UE Dressing with Stand-by Assistance.  LE Dressing with Moderate Assistance.  Grooming while standing with Minimal Assistance.  Toileting from bedside commode with Moderate Assistance for hygiene and clothing management.   Toilet transfer to bedside commode with Minimal Assistance.    Outcome: Ongoing, Progressing   OT eval completed, and above goals established. MADAN Frias  11/29/2020

## 2020-11-29 NOTE — PT/OT/SLP EVAL
Physical Therapy Evaluation  Co-evaluation/co-treatment performed with OT    Patient Name:  Ciara Lozano   MRN:  [id number]    Recommendations:     Discharge Recommendations:  nursing facility, skilled   Discharge Equipment Recommendations: other (see comments)(TBD)   Barriers to discharge: Inaccessible home and Decreased caregiver support    Assessment:     Ciara Lozano is a 85 y.o. female admitted with a medical diagnosis of Acute cystitis.  She presents with the following impairments/functional limitations:  weakness, impaired endurance, gait instability, impaired functional mobilty, impaired self care skills, impaired balance Patient tolerated initial evaluation fairly. Pt with decreased alertness/participation this date. Pt frequently closing her eyes and turning head away  from therapists during subjective interview. Pt states that she wants to be more independent but demonstrates self limiting behavior. Pt required max to totalA for bed mobility and Rani x 2 for sit to stand transfer. Pt unable to initiate steps this date. Patient is not safe to return home at this time due to high fall risk. Patient would benefit from SNF placement to address the above deficits and maximize independence with functional mobility. Ciara Lozano would benefit from acute PT intervention to address listed functional deficits, provide patient/caregiver education, reduce fall risk, and maximize (I) and safety with functional mobility.    Rehab Prognosis: fair; patient would benefit from acute skilled PT services to address these deficits and reach maximum level of function.      Plan:     During this hospitalization, patient to be seen 3 x/week to address the identified rehab impairments via therapeutic activities, gait training, therapeutic exercises, neuromuscular re-education and progress toward the following goals:    · Plan of Care Expires:  12/29/20    This plan of care has been discussed with the patient/caregiver, who was  included in its development and is in agreement with the identified goals and treatment plan.     Subjective     Communicated with RN prior to session.  Patient agreeable to participate.     Chief Complaint: fatigue   Patient/Family Comments/goals: none stated     Pain/Comfort:  · Pain Rating 1: 0/10  · Pain Rating Post-Intervention 1: 0/10    Patients cultural, spiritual, Mandaeism conflicts given the current situation: no    Living Environment: patient is a questionable historian. Pt lives alone in [facility] with 3 WILLIE.   Prior Level of Function: independent with mobility and ADLs  DME used: cane, straight, bath bench, walker, rolling, bedside commode  DME owned (not currently used): none  Upon discharge, patient will have assistance from: unknown (?)    Objective:     Patient found supine with pulse ox (continuous), peripheral IV, cervantes catheter, telemetry  upon PT entry to room.    General Precautions: Standard, fall   Orthopedic Precautions:N/A   Braces: N/A     Exams:     Cognition:  o Pt is alert and oriented x 3     Sensation:   o Light touch sensation: did not assess     Gross Motor Coordination: did not assess     Lower Extremity Range of Motion:  o Right Lower Extremity: WFL  o Left Lower Extremity: WFL     Lower Extremity Strength:  o Right Lower Extremity: did not formally test due to decreased participation; pt req assist for moving LEs to EOB but no knee buckling in stance   o Left Lower Extremity: did not formally test     Functional Mobility:    Bed Mobility:  · Supine > Sit: Total Assistance x 2   · Sit > Supine: Total Assistance x 2   · Rolling L and R: MaxA     Transfers:   · Sit <> Stand Transfer: Rani x 2 from EOB with [place]ton HHA   · Bed <> Chair: not performed             Gait:  · Not performed    Balance:  · Static Sit: Contact-Guard Assist at EOB x 10 minutes  · Static Stand: Min Assist with Hand-held assist x 2      Therapeutic Activities/Exercises   Upon entering pt room, pt soiled of B&B. Pt  required maxA for rolling L and R and total A for pericare.     AM-PAC 6 CLICK MOBILITY  Total Score:10     Patient left supine with all lines intact and call button in reach.      Patient/Caregiver Education:     Therapist educated pt/caregiver regarding:    Therapist provided facilitation and instruction of proper body mechanics, energy conservation, and fall prevention strategies during tasks listed above.   PT POC and goals for therapy    Instruction on use of call button and importance of calling nursing staff for assistance with mobility/transfers    Patient/caregiver able to verbalize understanding; will follow-up with pt/caregiver during current admit for additional questions/concerns within scope of practice.     White board updated.       History/Goals:     PAST MEDICAL HISTORY:  Past Medical History:   Diagnosis Date    CAD (coronary artery disease) 2014    Carotid artery disease     Congestive heart failure, unspecified     Encounter for blood transfusion     Hemangioma 2013    Hyperlipidemia     Hypertension     Hypothyroidism 2020    Peripheral vascular disease     Syncope and collapse        Past Surgical History:   Procedure Laterality Date    CARDIAC CATHETERIZATION      cataracts      CORONARY ANGIOPLASTY         GOALS:   Multidisciplinary Problems     Physical Therapy Goals        Problem: Physical Therapy Goal    Goal Priority Disciplines Outcome Goal Variances Interventions   Physical Therapy Goal     PT, PT/OT Ongoing, Progressing     Description: Goals to be met by: 2020     Patient will increase functional independence with mobility by performin. Supine to sit with Moderate Assistance  2. Sit to supine with Moderate Assistance  3. Sit to stand transfer with Moderate Assistance  4. Bed to chair transfer with Moderate Assistance using LRAD  5. Gait x 10 feet with modA and appropriate AD                       Time Tracking:     PT Received On:  11/29/20  PT Start Time: 1151     PT Stop Time: 1215  PT Total Time (min): 24 min     Billable Minutes: Evaluation 12 and Therapeutic Activity 12    Additional staff present: OT present for co-evaluation and co-treatment to accommodate for patients multiple deficits and co-morbidities requiring two skilled therapists. Co-treatment is medically necessary to appropriately assess patient strength and endurance and to appropriately facilitate neuromuscular postural balance and control while working within the individuals activity tolerance.     LEILA KONG, PT  11/29/2020  Pager # [phone]

## 2020-11-29 NOTE — PT/OT/SLP EVAL
Occupational Therapy   Co-Evaluation    Name: Ciara Lozano  MRN: [id number]  Admitting Diagnosis:  Acute cystitis; sepsis and encephalopathy    Recommendations:     Discharge Recommendations: nursing facility, skilled  Discharge Equipment Recommendations:  (TBD)  Barriers to discharge:  Decreased caregiver support    Assessment:     Ciara Lozano is a 85 y.o. female with a medical diagnosis of Acute cystitis.  She presents with decreased effort during evaluation. Performance deficits affecting function: weakness, impaired functional mobilty, impaired cardiopulmonary response to activity, gait instability, impaired endurance, impaired balance, impaired self care skills.  Pt appropriate for cotreat due to acuity and complexity of pt's medical status with 2 skilled disciplines needed to optimize pts functional performance.    Rehab Prognosis: Fair; patient would benefit from acute skilled OT services to address these deficits and reach maximum level of function.       Plan:     Patient to be seen 3 x/week to address the above listed problems via self-care/home management, therapeutic activities, therapeutic exercises  · Plan of Care Expires: 12/29/20  · Plan of Care Reviewed with: patient    Subjective     Chief Complaint: weakness and fatigue  Patient/Family Comments/goals: none stated    Occupational Profile:  Living Environment: patient is a questionable historian. Pt lives alone in [facility] with 3 WILLIE.   Prior Level of Function: independent with mobility and ADLs  DME used: cane, straight, bath bench, walker, rolling, bedside commode  DME owned (not currently used): none  Upon discharge, patient will have assistance from: unknown (?)      Pain/Comfort:  · Pain Rating 1: 0/10  · Pain Rating Post-Intervention 1: 0/10    Patients cultural, spiritual, Mormonism conflicts given the current situation: no    Objective:     Communicated with: RN prior to session.  Patient found supine with pulse ox (continuous), telemetry,  peripheral IV, cervantes catheter upon OT entry to room.    General Precautions: Standard, fall   Orthopedic Precautions:    Braces:       Occupational Performance:    Bed Mobility:    · Total A x 2 supine<>sit    Functional Mobility/Transfers:  · Min A x 2 from EOB with B HHA  · Functional Mobility: NT    Activities of Daily Living:  · Grooming: contact guard assistance for washing face; pt declining further grooming tasks    Cognitive/Visual Perceptual:  A&O x 3  Follows commands, though fatigued    Physical Exam:  PPT; rounded shoulders  ROM WFL B UE  MMT NT B UE 2* decreased participation    AMPAC 6 Click ADL:  AMPAC Total Score: 12    Treatment & Education:  Pt ed on OT POC  Daily orientation provided  Pt ed on importance of activity participation  Pt sat EOB x 10 min with CGA  Education:    Patient left supine with all lines intact, call button in reach and RN notified    GOALS:   Multidisciplinary Problems     Occupational Therapy Goals        Problem: Occupational Therapy Goal    Goal Priority Disciplines Outcome Interventions   Occupational Therapy Goal     OT, PT/OT Ongoing, Progressing    Description: Goals to be met by: 12/13/20     Patient will increase functional independence with ADLs by performing:    Feeding with Set-up Assistance.  UE Dressing with Stand-by Assistance.  LE Dressing with Moderate Assistance.  Grooming while standing with Minimal Assistance.  Toileting from bedside commode with Moderate Assistance for hygiene and clothing management.   Toilet transfer to bedside commode with Minimal Assistance.                     History:     Past Medical History:   Diagnosis Date    CAD (coronary artery disease) 8/12/2014    Carotid artery disease     Congestive heart failure, unspecified     Encounter for blood transfusion     Hemangioma 4/2/2013    Hyperlipidemia     Hypertension     Hypothyroidism 11/27/2020    Peripheral vascular disease     Syncope and collapse        Past Surgical  History:   Procedure Laterality Date    CARDIAC CATHETERIZATION      cataracts      CORONARY ANGIOPLASTY         Time Tracking:     OT Date of Treatment: 11/29/20  OT Start Time: 1151  OT Stop Time: 1213  OT Total Time (min): 22 min    Billable Minutes:Evaluation 10  Self Care/Home Management 12    MADAN Frias  11/29/2020

## 2020-11-29 NOTE — PLAN OF CARE
Problem: Adult Inpatient Plan of Care  Goal: Plan of Care Review  Outcome: Ongoing, Progressing   Patient oriented to self only, VSS. Pt slept throughout the day. Safety maintained throughout shift, no acute changes. WCTM

## 2020-11-29 NOTE — PLAN OF CARE
Problem: Physical Therapy Goal  Goal: Physical Therapy Goal  Description: Goals to be met by: 2020     Patient will increase functional independence with mobility by performin. Supine to sit with Moderate Assistance  2. Sit to supine with Moderate Assistance  3. Sit to stand transfer with Moderate Assistance  4. Bed to chair transfer with Moderate Assistance using LRAD  5. Gait x 10 feet with modA and appropriate AD      Outcome: Ongoing, Progressing     Initial Evaluation performed and Physical Therapy POC established this date. Patient is appropriate for acute PT services and is in agreement with established goals.     Regine French, PT, DPT  2020  Pager# [phone]

## 2020-11-29 NOTE — PROGRESS NOTES
Progress Note  Hospital Medicine       Patient Name: Ciara Lozano  MRN:  [id number]  [facility] Medicine Team: [facility] HOSP MED K Annel Sanchez MD  Date of Admission:  11/27/2020     Principal Problem:  Acute cystitis   Primary Care Physician: Andreina Gonzales MD      Interval history       Much improved mental status this AM, on entry to room she was sitting up in the bed and feeding herself breakfast, cut up her sausage for her and she was drinking good water, reports being thirsty and her water was all finished so called nurses to get some more for her. Cr better and her blood CX + now, changed to cefepime and urine CX also with GNR, repeat pending. She denies all other symptoms now, says feeling better. Bp improving. Some light fluids again today, HR up so adding back toprol today. Reports no other pain or issues today. Left message for granddaughter wilson letting her know shes was much improved mental status today. [facility] records show a very complex stay in early November she told me about yesterday in person.        Review of Systems     Unable to obtain due to AMS      Past Medical History: Patient has a past medical history of CAD (coronary artery disease) (8/12/2014), Carotid artery disease, Congestive heart failure, unspecified, Encounter for blood transfusion, Hemangioma (4/2/2013), Hyperlipidemia, Hypertension, Hypothyroidism (11/27/2020), Peripheral vascular disease, and Syncope and collapse.    Past Surgical History: Patient has a past surgical history that includes cataracts; Cardiac catheterization; and Coronary angioplasty.    Social History: Patient reports that she quit smoking about 25 years ago. She has a 5.00 pack-year smoking history. She does not have any smokeless tobacco history on file. She reports that she does not drink alcohol or use drugs.    Family History: family history includes Anuerysm in her sister; Hypertension in her father and mother; No Known Problems in her brother, maternal  aunt, maternal grandfather, maternal grandmother, maternal uncle, paternal aunt, paternal grandfather, paternal grandmother, paternal uncle, and sister.    Medications: Scheduled Meds:   atorvastatin  80 mg Oral QHS    cefTRIAXone (ROCEPHIN) IVPB  2 g Intravenous Q24H    ferrous sulfate  325 mg Oral Daily    levothyroxine  50 mcg Oral Before breakfast    metoprolol tartrate  12.5 mg Oral BID    mupirocin   Nasal BID    polyethylene glycol  17 g Oral Daily    senna-docusate 8.6-50 mg  1 tablet Oral BID     Continuous Infusions:   sodium chloride 0.9%       PRN Meds:.acetaminophen, dextrose 50%, dextrose 50%, glucagon (human recombinant), glucose, glucose, melatonin, melatonin, nitroGLYCERIN, ondansetron, sodium chloride 0.9%, sodium chloride 0.9%    Allergies: Patient is allergic to pollen extracts.    Physical Exam:     Vital Signs (Most Recent):  Temp: 98.8 °F (37.1 °C) (11/29/20 0425)  Pulse: 100 (11/29/20 0515)  Resp: 18 (11/29/20 0515)  BP: (!) 104/52(checked manual) (11/29/20 0515)  SpO2: (!) 94 % (11/29/20 0515) Vital Signs Range (Last 24H):  Temp:  [97.8 °F (36.6 °C)-98.8 °F (37.1 °C)]   Pulse:  []   Resp:  [18-28]   BP: ()/(40-76)   SpO2:  [94 %-98 %]    Body mass index is 29.96 kg/m².     Physical Exam:  Constitutional: Appears well-developed and well-nourished. much more awake today. Feeding herself.  Head: Normocephalic and atraumatic.   Mouth/Throat: Oropharynx is clear and moist.   Eyes: EOM are normal. Pupils are equal, round, and reactive to light. No scleral icterus.   Neck: Normal range of motion. Neck supple.   Cardiovascular: Normal rate and regular rhythm.  No murmur heard.  Pulmonary/Chest: Effort normal and breath sounds normal. No respiratory distress. No wheezes, rales, or rhonchi. .flat in bed on RA.  Abdominal: Soft. Bowel sounds are normal.  No distension or tenderness  : cervantes in place. Dark urine, some mild blood tinge.  Musculoskeletal: Normal range of motion. No  edema. Mild pain with deep palpation of ankles but non focal.  Neurological: Alert and oriented to person, place. anita improved.   Skin: Skin is warm and dry. Some bruising seen on extremities.   Psychiatric: likely close to baseline again. Answer qeuestions.  Vitals reviewed.    Recent Labs   Lab 11/27/20  1232 11/28/20  0638 11/29/20  0702   WBC 8.91 9.67 9.24   HGB 9.9* 8.1* 7.8*   HCT 32.9* 28.9* 26.8*    206 197       Recent Labs   Lab 11/27/20  1232 11/28/20  0638    139   K 4.3 3.6    111*   CO2 20* 15*   BUN 63* 66*   CREATININE 3.0* 2.5*    103   CALCIUM 9.7 8.7   MG 1.9 2.5   PHOS 4.3 4.2     Recent Labs   Lab 11/27/20  1232 11/28/20  0638   ALKPHOS 109 99   ALT 25 24   AST 37 30   ALBUMIN 2.7* 2.0*   PROT 7.4 5.6*   BILITOT 1.2* 0.7   INR 1.2  --       Recent Labs   Lab 11/27/20 2001 11/27/20 2119   POCTGLUCOSE 81 119*         Assessment and Plan:     Ms. Ciara Lozano is a 85 y.o. female who presented to [facility] on 11/27/2020 with     Sepsis secondary to acute cystitis  Septic encephalopathy  GNR bacteremia  -UA with leukos, blood, many bacteria  -non septic on exam, WBC 8  -lactic 1.8, procal 1.9  -on rocephin now, no CX with growth in past  -blood CX + with repeat pending. and urine CX with GNR    Acute metabolic encephalopathy  -likely from sepsis as well as mild uremia bun 63, baseline 20s from JOHNNY  -IVF to treat JOHNNY/cervantes placed for retention  -treat UTI  -trend. Improves with prompting but has been a chronic issue in last month per discussion with granddaughter and per [facility] notes as well  -had large workup at [facility] earlier this month  -improved greatly 11/29    hypothyrooidism  Not on meds it apears  -tsh low, free t4 wnl but given confusion will treat now. Repeat 6 weeks    HTN  -low BPs overnight likely from volume down. Holding home BP meds, light IVF and cont to reassess to avoid volume overload considering major issues with this earlier in the month at [facility]  -improving  now with ydration  -bolus PRN    Chronic systolic/diastolic heart failure  -not overloaded now, cxr clear  -hold lasix, arb for yuliya  -light ivf- continue now for hypotension on admit and volume down,yuliya.  -on room air  -repeat echo pending    Acute kidney injury  Hydronephrosis  Hematuria  -baseline 1 and at 3 now->2.5--> 2.0 AM  -fena appears post obstr but on lasix, feUrea ordered  -UTI also contributor  -cervantes placed with clots/hematuria. Hold asa/plavix from home temporarily  -US showing mild hydro (Seen on prev US in past) as well as layering mass- likely blood, and on cervantes placement had hematuria. Earlier in month at [facility] had this, uro consulted, was supposed to have cervantes at home also for hydro it appears. Will def need to go home with one now.  -treat UTI    Anemia of chronic renal disease  -hg 7 in the past, is 8-9 now. Hematuria on admit with some acute blood loss anemia not unexpected from this, hold asa/plavix in interim.  -iron studies wnl  -required transfusions earlier this month at OSH    Elevated troponin  -chronic issue  -trop .164 and trended downward.. echo pending . 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 will need steady state repeat    Memory issues  -baseline has some and reported at OSH as well  -discussed with granddaughter on 11/28 at bedside    Deconditioning  -PT/OT ordered  -may benefit from SNF when med stable             Diet:  renal  DVT PPx:  ZORAIDA/SCDS      Disposition:  Improvement in Cr, mental status. PT recs    Likely few day stay for improvements      Granddaughter wilson coats is point of contact, discussed with her 11/28

## 2020-11-30 LAB
ALBUMIN SERPL BCP-MCNC: 1.6 G/DL (ref 3.5–5.2)
ALP SERPL-CCNC: 88 U/L (ref 55–135)
ALT SERPL W/O P-5'-P-CCNC: 26 U/L (ref 10–44)
ANION GAP SERPL CALC-SCNC: 10 MMOL/L (ref 8–16)
ANISOCYTOSIS BLD QL SMEAR: SLIGHT
AST SERPL-CCNC: 32 U/L (ref 10–40)
BACTERIA BLD CULT: ABNORMAL
BASOPHILS # BLD AUTO: 0.02 K/UL (ref 0–0.2)
BASOPHILS NFR BLD: 0.2 % (ref 0–1.9)
BILIRUB SERPL-MCNC: 0.4 MG/DL (ref 0.1–1)
BUN SERPL-MCNC: 61 MG/DL (ref 8–23)
BURR CELLS BLD QL SMEAR: ABNORMAL
CALCIUM SERPL-MCNC: 8.2 MG/DL (ref 8.7–10.5)
CHLORIDE SERPL-SCNC: 109 MMOL/L (ref 95–110)
CO2 SERPL-SCNC: 17 MMOL/L (ref 23–29)
CREAT SERPL-MCNC: 1.5 MG/DL (ref 0.5–1.4)
DIFFERENTIAL METHOD: ABNORMAL
EOSINOPHIL # BLD AUTO: 0.1 K/UL (ref 0–0.5)
EOSINOPHIL NFR BLD: 1.5 % (ref 0–8)
ERYTHROCYTE [DISTWIDTH] IN BLOOD BY AUTOMATED COUNT: 21.2 % (ref 11.5–14.5)
EST. GFR  (AFRICAN AMERICAN): 36.6 ML/MIN/1.73 M^2
EST. GFR  (NON AFRICAN AMERICAN): 31.8 ML/MIN/1.73 M^2
GLUCOSE SERPL-MCNC: 89 MG/DL (ref 70–110)
HCT VFR BLD AUTO: 26.1 % (ref 37–48.5)
HGB BLD-MCNC: 7.8 G/DL (ref 12–16)
HYPOCHROMIA BLD QL SMEAR: ABNORMAL
IMM GRANULOCYTES # BLD AUTO: 0.26 K/UL (ref 0–0.04)
IMM GRANULOCYTES NFR BLD AUTO: 2.9 % (ref 0–0.5)
LYMPHOCYTES # BLD AUTO: 0.9 K/UL (ref 1–4.8)
LYMPHOCYTES NFR BLD: 9.9 % (ref 18–48)
MAGNESIUM SERPL-MCNC: 2 MG/DL (ref 1.6–2.6)
MCH RBC QN AUTO: 25.2 PG (ref 27–31)
MCHC RBC AUTO-ENTMCNC: 29.9 G/DL (ref 32–36)
MCV RBC AUTO: 84 FL (ref 82–98)
MONOCYTES # BLD AUTO: 0.8 K/UL (ref 0.3–1)
MONOCYTES NFR BLD: 8.8 % (ref 4–15)
NEUTROPHILS # BLD AUTO: 6.8 K/UL (ref 1.8–7.7)
NEUTROPHILS NFR BLD: 76.7 % (ref 38–73)
NRBC BLD-RTO: 0 /100 WBC
OVALOCYTES BLD QL SMEAR: ABNORMAL
PHOSPHATE SERPL-MCNC: 2.9 MG/DL (ref 2.7–4.5)
PLATELET # BLD AUTO: 198 K/UL (ref 150–350)
PLATELET BLD QL SMEAR: ABNORMAL
PMV BLD AUTO: 12.1 FL (ref 9.2–12.9)
POIKILOCYTOSIS BLD QL SMEAR: SLIGHT
POLYCHROMASIA BLD QL SMEAR: ABNORMAL
POTASSIUM SERPL-SCNC: 3.4 MMOL/L (ref 3.5–5.1)
PROT SERPL-MCNC: 4.9 G/DL (ref 6–8.4)
RBC # BLD AUTO: 3.1 M/UL (ref 4–5.4)
SODIUM SERPL-SCNC: 136 MMOL/L (ref 136–145)
WBC # BLD AUTO: 8.91 K/UL (ref 3.9–12.7)

## 2020-11-30 PROCEDURE — 63600175 PHARM REV CODE 636 W HCPCS: Performed by: HOSPITALIST

## 2020-11-30 PROCEDURE — 20600001 HC STEP DOWN PRIVATE ROOM

## 2020-11-30 PROCEDURE — 85025 COMPLETE CBC W/AUTO DIFF WBC: CPT

## 2020-11-30 PROCEDURE — 80053 COMPREHEN METABOLIC PANEL: CPT

## 2020-11-30 PROCEDURE — 99232 SBSQ HOSP IP/OBS MODERATE 35: CPT | Mod: ,,, | Performed by: HOSPITALIST

## 2020-11-30 PROCEDURE — 84100 ASSAY OF PHOSPHORUS: CPT

## 2020-11-30 PROCEDURE — 25000003 PHARM REV CODE 250: Performed by: HOSPITALIST

## 2020-11-30 PROCEDURE — 99232 PR SUBSEQUENT HOSPITAL CARE,LEVL II: ICD-10-PCS | Mod: ,,, | Performed by: HOSPITALIST

## 2020-11-30 PROCEDURE — 83735 ASSAY OF MAGNESIUM: CPT

## 2020-11-30 PROCEDURE — 36415 COLL VENOUS BLD VENIPUNCTURE: CPT

## 2020-11-30 RX ORDER — POTASSIUM CHLORIDE 20 MEQ/1
40 TABLET, EXTENDED RELEASE ORAL ONCE
Status: COMPLETED | OUTPATIENT
Start: 2020-11-30 | End: 2020-11-30

## 2020-11-30 RX ORDER — SODIUM BICARBONATE 650 MG/1
650 TABLET ORAL 3 TIMES DAILY
Status: DISCONTINUED | OUTPATIENT
Start: 2020-11-30 | End: 2020-12-07

## 2020-11-30 RX ORDER — CIPROFLOXACIN 500 MG/1
500 TABLET ORAL EVERY 24 HOURS
Status: DISCONTINUED | OUTPATIENT
Start: 2020-12-01 | End: 2020-12-01

## 2020-11-30 RX ADMIN — SODIUM BICARBONATE 650 MG TABLET 650 MG: at 08:11

## 2020-11-30 RX ADMIN — METOPROLOL TARTRATE 12.5 MG: 25 TABLET, FILM COATED ORAL at 08:11

## 2020-11-30 RX ADMIN — ATORVASTATIN CALCIUM 80 MG: 20 TABLET, FILM COATED ORAL at 09:11

## 2020-11-30 RX ADMIN — SODIUM BICARBONATE 650 MG TABLET 650 MG: at 02:11

## 2020-11-30 RX ADMIN — MUPIROCIN: 20 OINTMENT TOPICAL at 08:11

## 2020-11-30 RX ADMIN — CEFEPIME 2 G: 2 INJECTION, POWDER, FOR SOLUTION INTRAVENOUS at 12:11

## 2020-11-30 RX ADMIN — DOCUSATE SODIUM 50MG AND SENNOSIDES 8.6MG 1 TABLET: 8.6; 5 TABLET, FILM COATED ORAL at 08:11

## 2020-11-30 RX ADMIN — DOCUSATE SODIUM 50MG AND SENNOSIDES 8.6MG 1 TABLET: 8.6; 5 TABLET, FILM COATED ORAL at 09:11

## 2020-11-30 RX ADMIN — POTASSIUM CHLORIDE 40 MEQ: 1500 TABLET, EXTENDED RELEASE ORAL at 08:11

## 2020-11-30 RX ADMIN — FERROUS SULFATE TAB EC 325 MG (65 MG FE EQUIVALENT) 325 MG: 325 (65 FE) TABLET DELAYED RESPONSE at 08:11

## 2020-11-30 RX ADMIN — SODIUM BICARBONATE 650 MG TABLET 650 MG: at 09:11

## 2020-11-30 RX ADMIN — LEVOTHYROXINE SODIUM 50 MCG: 50 TABLET ORAL at 06:11

## 2020-11-30 RX ADMIN — METOPROLOL TARTRATE 12.5 MG: 25 TABLET, FILM COATED ORAL at 11:11

## 2020-11-30 RX ADMIN — MUPIROCIN: 20 OINTMENT TOPICAL at 09:11

## 2020-11-30 NOTE — PLAN OF CARE
Problem: Adult Inpatient Plan of Care  Goal: Plan of Care Review  Outcome: Ongoing, Progressing  Goal: Patient-Specific Goal (Individualization)  Outcome: Ongoing, Progressing  Flowsheets (Taken 11/30/2020 1627)  Individualized Care Needs: pt likes lights off in room and to sleep  Anxieties, Fears or Concerns: none  Patient-Specific Goals (Include Timeframe):   none   pt would not verbalize goals  Goal: Optimal Comfort and Wellbeing  Outcome: Ongoing, Progressing  Intervention: Provide Person-Centered Care  Flowsheets (Taken 11/30/2020 1627)  Trust Relationship/Rapport:   care explained   choices provided   emotional support provided   empathic listening provided   questions answered   questions encouraged   reassurance provided   thoughts/feelings acknowledged     Problem: Oral Intake Inadequate (Acute Kidney Injury/Impairment)  Goal: Optimal Nutrition Intake  Outcome: Ongoing, Progressing  Intervention: Promote and Optimize Nutrition  Flowsheets (Taken 11/30/2020 1627)  Oral Nutrition Promotion:   calorie dense foods provided   calorie dense liquids provided   rest periods promoted   safe use of adaptive equipment encouraged     Problem: Skin Injury Risk Increased  Goal: Skin Health and Integrity  Outcome: Ongoing, Progressing  Intervention: Optimize Skin Protection  Flowsheets (Taken 11/30/2020 1627)  Pressure Reduction Techniques:   frequent weight shift encouraged   heels elevated off bed  Pressure Reduction Devices: positioning supports utilized  Skin Protection:   adhesive use limited   electrode sites changed   incontinence pads utilized  Head of Bed (HOB): HOB flat     Pt awakens to voice, VSS, denies pain. No acute changes today. Pt lathargic, opens eyes when calling her name, pt has delayed responses and at times does not answer questions. Plan of care reviewed with pt.

## 2020-11-30 NOTE — PROGRESS NOTES
Progress Note  Hospital Medicine       Patient Name: Ciara Lozano  MRN:  [id number]  [facility] Medicine Team: [facility] HOSP MED K Annel Sanchez MD  Date of Admission:  11/27/2020     Principal Problem:  Acute cystitis   Primary Care Physician: Andreina Gonzales MD      Interval history       Patient is laying in bed more today as yesterday was feeding herself more on my exam, turned lights on for her and she was easy to arouse but subjecively chose not to answer some of questions. As seen based on previous days renard yesterday when she was eating, I do think a good bit of this is subjective, and she choses not to interact sometimes with examiners as easily perks up when she was given iV sticks over weekend, when I came in and saw her eating breakfast and feeding herself yesterday and reported beign thirsty. Cr improving today and other labs stable. Echo improved from prev at [facility]. No signs of volume up. Denies SOB. K replaced today. Output in cervantes improved. Likely can resume anticoagulants- asa +/- plavix tomorrow if hg stays stable for us. Repeat blood Cx without growth, K. Pneumo and klebsiella without resistance patterns, pharmD ASSISTING- to cipro now, and likely will go to BID if Cr continuse to improve now. Updated rosario rachel on phone, will pursue SNF now for her which she agrees with, updated CM in epic chat for send referras today for her.          Review of Systems     Unable to obtain due to AMS      Past Medical History: Patient has a past medical history of CAD (coronary artery disease) (8/12/2014), Carotid artery disease, Congestive heart failure, unspecified, Encounter for blood transfusion, Hemangioma (4/2/2013), Hyperlipidemia, Hypertension, Hypothyroidism (11/27/2020), Peripheral vascular disease, and Syncope and collapse.    Past Surgical History: Patient has a past surgical history that includes cataracts; Cardiac catheterization; and Coronary angioplasty.    Social History: Patient reports  that she quit smoking about 25 years ago. She has a 5.00 pack-year smoking history. She does not have any smokeless tobacco history on file. She reports that she does not drink alcohol or use drugs.    Family History: family history includes Anuerysm in her sister; Hypertension in her father and mother; No Known Problems in her brother, maternal aunt, maternal grandfather, maternal grandmother, maternal uncle, paternal aunt, paternal grandfather, paternal grandmother, paternal uncle, and sister.    Medications: Scheduled Meds:   atorvastatin  80 mg Oral QHS    [START ON 12/1/2020] ciprofloxacin HCl  500 mg Oral Q24H    ferrous sulfate  325 mg Oral Daily    levothyroxine  50 mcg Oral Before breakfast    metoprolol tartrate  12.5 mg Oral BID    mupirocin   Nasal BID    polyethylene glycol  17 g Oral Daily    senna-docusate 8.6-50 mg  1 tablet Oral BID    sodium bicarbonate  650 mg Oral TID     Continuous Infusions:    PRN Meds:.acetaminophen, dextrose 50%, dextrose 50%, glucagon (human recombinant), glucose, glucose, melatonin, melatonin, nitroGLYCERIN, ondansetron, sodium chloride 0.9%, sodium chloride 0.9%    Allergies: Patient is allergic to pollen extracts.    Physical Exam:     Vital Signs (Most Recent):  Temp: 98.2 °F (36.8 °C) (11/30/20 1200)  Pulse: 107 (11/30/20 1200)  Resp: 15 (11/30/20 1200)  BP: (!) 113/57 (11/30/20 1200)  SpO2: 97 % (11/30/20 1200) Vital Signs Range (Last 24H):  Temp:  [98 °F (36.7 °C)-98.4 °F (36.9 °C)]   Pulse:  []   Resp:  [15-20]   BP: (107-130)/(53-60)   SpO2:  [95 %-98 %]    Body mass index is 30.36 kg/m².     Physical Exam:  Constitutional: Appears well-developed and well-nourished. awake when prompted.  Head: Normocephalic and atraumatic.   Mouth/Throat: Oropharynx is clear and moist.   Eyes: EOM are normal. Pupils are equal, round, and reactive to light. No scleral icterus.   Neck: Normal range of motion. Neck supple.   Cardiovascular: Normal rate and regular  rhythm.  No murmur heard.  Pulmonary/Chest: Effort normal and breath sounds normal. No respiratory distress. No wheezes, rales, or rhonchi. .flat in bed on RA.  Abdominal: Soft. Bowel sounds are normal.  No distension or tenderness  : cervantes in place. Urine lighter today.  Musculoskeletal: Normal range of motion. No edema.   Neurological: Alert and oriented to person, place. At times have to have more prompting to get questions out of her but a good bit subjective as easily will answer with heavier prompting  Skin: Skin is warm and dry. Some bruising seen on extremities.   Psychiatric: likely close to baseline again. Answer qeuestions.  Vitals reviewed.    Recent Labs   Lab 11/28/20  0638 11/29/20  0702 11/30/20  0405   WBC 9.67 9.24 8.91   HGB 8.1* 7.8* 7.8*   HCT 28.9* 26.8* 26.1*    197 198       Recent Labs   Lab 11/28/20  0638 11/29/20  0702 11/30/20  0405    141 136   K 3.6 3.4* 3.4*   * 112* 109   CO2 15* 19* 17*   BUN 66* 67* 61*   CREATININE 2.5* 2.0* 1.5*    88 89   CALCIUM 8.7 8.3* 8.2*   MG 2.5 1.7 2.0   PHOS 4.2 4.0 2.9     Recent Labs   Lab 11/27/20  1232 11/28/20  0638 11/29/20  0702 11/30/20  0405   ALKPHOS 109 99 97 88   ALT 25 24 22 26   AST 37 30 38 32   ALBUMIN 2.7* 2.0* 1.7* 1.6*   PROT 7.4 5.6* 5.2* 4.9*   BILITOT 1.2* 0.7 0.4 0.4   INR 1.2  --   --   --       Recent Labs   Lab 11/27/20 2001 11/27/20 2119   POCTGLUCOSE 81 119*         Assessment and Plan:     Ms. Ciara Lozano is a 85 y.o. female who presented to [facility] on 11/27/2020 with     Sepsis secondary to acute cystitis  Septic encephalopathy  GNR bacteremia  -UA with leukos, blood, many bacteria  -non septic on exam, WBC 8  -lactic 1.8, procal 1.9  -on rocephin now, no CX with growth in past  -blood CX + with repeat NGTD. and urine CX klebsiella, proteus. Change to cipro on 11/30 per pharmD assistance. Plan for 14 days from negative culture.    Acute metabolic encephalopathy  -likely from sepsis as well as  "mild uremia bun 63, baseline 20s from JOHNNY  -IVF to treat JOHNNY/cervantes placed for retention  -treat UTI  -trend. Improves with prompting but has been a chronic issue in last month per discussion with granddaughter and per [facility] notes as well  -had large workup at [facility] earlier this month  -improved greatly 11/29 and 11/30 simliar to other days where some is subjective but is alert, oriented.     hypothyrooidism  Not on meds it apears  -tsh low, free t4 wnl but given confusion will treat now. Repeat 6 weeks    HTN  -low BPs overnight likely from volume down. Holding home BP meds, light IVF and cont to reassess to avoid volume overload considering major issues with this earlier in the month at [facility]  -improved with hydration.  -bolus PRN    Chronic systolic/diastolic heart failure  -not overloaded now, cxr clear  -hold lasix, arb for johnny  -light ivf- continue now for hypotension on admit and volume down,johnny.  -on room air  -repeat echo improved from previous with known decreased EF but pericardial effusion now small (large previous)    Acute kidney injury  Hydronephrosis  Hematuria  -baseline 1 and at 3 now->2.5--> 2.0 -->1.5  -fena appears post obstr but on lasix, feUrea ordered  -UTI also contributor  -cervantes placed with clots/hematuria. Hold asa/plavix from home temporarily  -US showing mild hydro (Seen on prev US in past) as well as layering mass- likely blood, and on cervantes placement had hematuria. Earlier in month at [facility] had this, uro consulted, was supposed to have cervantes at home also for hydro it appears. Will def need to go home with one now.  -treat UTI  -granddaughter reports she pulled out her cervantes at home she was sent home on and thast when things semeed to "go downhill. Instructed will need to remain in place indefinitely until can f/u with uro as outpatient to have likey cysto/etc to figure out reason for chronic hydro with uro clinic f/u    Anemia of chronic renal disease  -hg 7 in the past, is 8-9 now. Hematuria " on admit with some acute blood loss anemia not unexpected from this, hold asa/plavix in interim.  -iron studies wnl  -required transfusions earlier this month at OSH    Elevated troponin  -chronic issue  -trop .164 and trended downward.. echo pending . 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 will need steady state repeat    Memory issues  -baseline has some and reported at OSH as well  -discussed with granddaughter on 11/28 at bedside    Deconditioning  -PT/OTand SNF rec  -granddaughter agrees with placement, asked cm today to send referrals for her             Diet:  renal  DVT PPx:  ZORAIDA/SCDS      Disposition:  Improvement in Cr, mental status stability. PT recs    Likely few day stay for improvements, possible wed-thurs may be med ready    Granddaughter wilson coats is point of contact, discussed with her 11/30 on phone

## 2020-11-30 NOTE — PLAN OF CARE
Pt. Aaox4.vss.no acute changes during shift. [place] catheter CDI. Safety measures maintained. Poc reviewed with pt.

## 2020-12-01 PROBLEM — R78.81 BACTEREMIA: Status: ACTIVE | Noted: 2020-12-01

## 2020-12-01 PROBLEM — R33.9 CHRONIC RETENTION OF URINE: Status: ACTIVE | Noted: 2020-12-01

## 2020-12-01 LAB
ALBUMIN SERPL BCP-MCNC: 1.6 G/DL (ref 3.5–5.2)
ALP SERPL-CCNC: 84 U/L (ref 55–135)
ALT SERPL W/O P-5'-P-CCNC: 21 U/L (ref 10–44)
ANION GAP SERPL CALC-SCNC: 5 MMOL/L (ref 8–16)
AST SERPL-CCNC: 23 U/L (ref 10–40)
BACTERIA UR CULT: ABNORMAL
BACTERIA UR CULT: ABNORMAL
BASOPHILS # BLD AUTO: 0.03 K/UL (ref 0–0.2)
BASOPHILS NFR BLD: 0.3 % (ref 0–1.9)
BILIRUB SERPL-MCNC: 0.6 MG/DL (ref 0.1–1)
BUN SERPL-MCNC: 50 MG/DL (ref 8–23)
CALCIUM SERPL-MCNC: 8.6 MG/DL (ref 8.7–10.5)
CHLORIDE SERPL-SCNC: 110 MMOL/L (ref 95–110)
CO2 SERPL-SCNC: 20 MMOL/L (ref 23–29)
CREAT SERPL-MCNC: 1.2 MG/DL (ref 0.5–1.4)
DIFFERENTIAL METHOD: ABNORMAL
EOSINOPHIL # BLD AUTO: 0.1 K/UL (ref 0–0.5)
EOSINOPHIL NFR BLD: 1.2 % (ref 0–8)
ERYTHROCYTE [DISTWIDTH] IN BLOOD BY AUTOMATED COUNT: 20.5 % (ref 11.5–14.5)
EST. GFR  (AFRICAN AMERICAN): 48 ML/MIN/1.73 M^2
EST. GFR  (NON AFRICAN AMERICAN): 41.6 ML/MIN/1.73 M^2
GLUCOSE SERPL-MCNC: 85 MG/DL (ref 70–110)
HCT VFR BLD AUTO: 27.9 % (ref 37–48.5)
HGB BLD-MCNC: 8.2 G/DL (ref 12–16)
IMM GRANULOCYTES # BLD AUTO: 0.37 K/UL (ref 0–0.04)
IMM GRANULOCYTES NFR BLD AUTO: 4 % (ref 0–0.5)
LYMPHOCYTES # BLD AUTO: 0.9 K/UL (ref 1–4.8)
LYMPHOCYTES NFR BLD: 9.6 % (ref 18–48)
MAGNESIUM SERPL-MCNC: 2 MG/DL (ref 1.6–2.6)
MCH RBC QN AUTO: 25.5 PG (ref 27–31)
MCHC RBC AUTO-ENTMCNC: 29.4 G/DL (ref 32–36)
MCV RBC AUTO: 87 FL (ref 82–98)
MONOCYTES # BLD AUTO: 0.7 K/UL (ref 0.3–1)
MONOCYTES NFR BLD: 7.6 % (ref 4–15)
NEUTROPHILS # BLD AUTO: 7.2 K/UL (ref 1.8–7.7)
NEUTROPHILS NFR BLD: 77.3 % (ref 38–73)
NRBC BLD-RTO: 0 /100 WBC
PHOSPHATE SERPL-MCNC: 2.6 MG/DL (ref 2.7–4.5)
PLATELET # BLD AUTO: 179 K/UL (ref 150–350)
PMV BLD AUTO: 12.6 FL (ref 9.2–12.9)
POTASSIUM SERPL-SCNC: 4 MMOL/L (ref 3.5–5.1)
PROT SERPL-MCNC: 5.1 G/DL (ref 6–8.4)
RBC # BLD AUTO: 3.21 M/UL (ref 4–5.4)
SODIUM SERPL-SCNC: 135 MMOL/L (ref 136–145)
WBC # BLD AUTO: 9.26 K/UL (ref 3.9–12.7)

## 2020-12-01 PROCEDURE — 20600001 HC STEP DOWN PRIVATE ROOM

## 2020-12-01 PROCEDURE — 84100 ASSAY OF PHOSPHORUS: CPT

## 2020-12-01 PROCEDURE — 99232 PR SUBSEQUENT HOSPITAL CARE,LEVL II: ICD-10-PCS | Mod: ,,, | Performed by: HOSPITALIST

## 2020-12-01 PROCEDURE — 25000003 PHARM REV CODE 250: Performed by: HOSPITALIST

## 2020-12-01 PROCEDURE — 80053 COMPREHEN METABOLIC PANEL: CPT

## 2020-12-01 PROCEDURE — 97112 NEUROMUSCULAR REEDUCATION: CPT

## 2020-12-01 PROCEDURE — 97530 THERAPEUTIC ACTIVITIES: CPT

## 2020-12-01 PROCEDURE — 99232 SBSQ HOSP IP/OBS MODERATE 35: CPT | Mod: ,,, | Performed by: HOSPITALIST

## 2020-12-01 PROCEDURE — 85025 COMPLETE CBC W/AUTO DIFF WBC: CPT

## 2020-12-01 PROCEDURE — 83735 ASSAY OF MAGNESIUM: CPT

## 2020-12-01 PROCEDURE — 36415 COLL VENOUS BLD VENIPUNCTURE: CPT

## 2020-12-01 RX ORDER — CIPROFLOXACIN 500 MG/1
500 TABLET ORAL EVERY 12 HOURS
Status: DISCONTINUED | OUTPATIENT
Start: 2020-12-01 | End: 2020-12-11 | Stop reason: HOSPADM

## 2020-12-01 RX ORDER — CLOPIDOGREL BISULFATE 75 MG/1
75 TABLET ORAL DAILY
Status: DISCONTINUED | OUTPATIENT
Start: 2020-12-01 | End: 2020-12-11 | Stop reason: HOSPADM

## 2020-12-01 RX ADMIN — SODIUM BICARBONATE 650 MG TABLET 650 MG: at 02:12

## 2020-12-01 RX ADMIN — FERROUS SULFATE TAB EC 325 MG (65 MG FE EQUIVALENT) 325 MG: 325 (65 FE) TABLET DELAYED RESPONSE at 08:12

## 2020-12-01 RX ADMIN — METOPROLOL TARTRATE 12.5 MG: 25 TABLET, FILM COATED ORAL at 08:12

## 2020-12-01 RX ADMIN — LEVOTHYROXINE SODIUM 50 MCG: 50 TABLET ORAL at 06:12

## 2020-12-01 RX ADMIN — MUPIROCIN: 20 OINTMENT TOPICAL at 09:12

## 2020-12-01 RX ADMIN — DOCUSATE SODIUM 50MG AND SENNOSIDES 8.6MG 1 TABLET: 8.6; 5 TABLET, FILM COATED ORAL at 08:12

## 2020-12-01 RX ADMIN — MUPIROCIN: 20 OINTMENT TOPICAL at 08:12

## 2020-12-01 RX ADMIN — ATORVASTATIN CALCIUM 80 MG: 20 TABLET, FILM COATED ORAL at 08:12

## 2020-12-01 RX ADMIN — CIPROFLOXACIN 500 MG: 500 TABLET, FILM COATED ORAL at 08:12

## 2020-12-01 RX ADMIN — SODIUM BICARBONATE 650 MG TABLET 650 MG: at 08:12

## 2020-12-01 RX ADMIN — CLOPIDOGREL 75 MG: 75 TABLET, FILM COATED ORAL at 08:12

## 2020-12-01 NOTE — PT/OT/SLP PROGRESS
Physical Therapy Progress Note  Co-treatment performed with OT    Patient Name:  Ciara Lozano  MRN: [id number]    Recommendations:     Discharge Recommendations:  nursing facility, skilled    Discharge Equipment Recommendations: wheelchair, walker, rolling   Barriers to discharge: Inaccessible home and Decreased caregiver support at current functional level      Assessment:       Ciara Lozano is a 85 y.o. female admitted with a medical diagnosis of Acute cystitis.  She presents with the following impairments/functional limitations:  weakness, impaired endurance, impaired self care skills, impaired functional mobilty, impaired balance. Patient tolerated treatment session fairly. Patient with low participation and minimal progress towards goals. Patient continues to require totalA x 2 for bed mobility. Patient sat EOB x 15 mins but refused to stand despite max encouragement from PT, OT and RN. Patient impulsively laying backwards several times to attempt to lay back down. Patient with intermittent confusion and agitation throughout treatment session, and often looks away or does not answer therapist questions. Writing therapist attempted to contact pts granddaughter Lynnette, who stated that she was too busy to talk at the time. Will try to communicate with family again at a later date to obtain a more accurate prior level of function and to determine level of assistance at home. Per patient, she lives alone and will not have assist upon d/c. Patient is a high fall risk and is not safe to return home at her current functional level. PT is recommending SNF after discharge in order to improve patients' functional mobility and maximize return to prior level of function.      Rehab Prognosis: Good; patient would benefit from acute skilled PT services to address these deficits and reach maximum level of function.      Plan:     During this hospitalization, patient to be seen 3 x/week to address the identified rehab  "impairments via gait training, therapeutic exercises, therapeutic activities, neuromuscular re-education and progress towards stated goals.     Plan of Care Expires:  12/29/20  Plan of Care reviewed with: patient    This plan of care has been discussed with the patient/caregiver, who was included in its development and is in agreement with the identified goals and treatment plan.     Subjective     Communicated with RN prior to session.  Patient agreeable to participate. RN present at bedside upon PT entrance into room.    Chief Complaint: tired, wanting to lay down  Patient/Family Comments/goals: "wait, wait" "Just give me a minute" "Im about to go in my room over there" "I can do everything by myself I don't need yall"  Pain/Comfort:  · Pain Rating 1: other (see comments)(did not rate)  · Pain Rating Post-Intervention 1: other (see comments)(did not rate)    Objective:        Patient found supine with: cervantes catheter, telemetry, peripheral IV, pulse ox (continuous)    General Precautions: Standard, fall   Orthopedic Precautions:N/A   Braces: N/A   Body mass index is 29.72 kg/m².  Oxygen Device: room air    Cognition:   Pt is Uncooperative and Confused during session.    Functional Mobility:    Bed Mobility:  · Supine > Sit: Total Assistance x 2  · Sit > Supine: Total Assistance x 2     Transfers:   · Sit <> Stand Transfer: pt declined to perform after 15 mins of encouragement from staff at EOB  · Pt stating "wait, wait" and would reach for RW, then put her hands back on the bed and put her head down                 Gait:  · Patient declined    Balance:  · Static Sit: Contact-Guard Assist to modA at EOB x 15 minutes  · Static stand: did not perform     Outcome Measure: AM-PAC 6 CLICK MOBILITY  Total Score:8       Therapeutic Activities/Exercises/NMR   Pt sat EOB x 15 mins unsupported with CGA, though with intermittent bouts of modA to prevent pt from laying down.     No family/friends present on eval or on this " treatment date. PT attempted to contact granddaughter for more accurate history due to patients continued confusion, unable to obtain information. Per chart review, patient family is amenable to SNF, though pt adamantly states that she is going home.     AM-PAC 6 CLICK MOBILITY  Total Score:8     Patient left supine with all lines intact, call button in reach and bed alarm on.       Patient/Caregiver Education:     Therapist educated pt/caregiver regarding:    Therapist provided facilitation and instruction of proper body mechanics, energy conservation, and fall prevention strategies during tasks listed above.   Educated patient on PT POC and answered all questions within PT scope of practice.   Instruction on use of call button and importance of calling nursing staff for assistance with mobility/transfers    Patient/caregiver able to verbalize understanding; will follow-up with pt/caregiver during current admit for additional questions/concerns within scope of practice.     White board updated.       Goals:     Multidisciplinary Problems     Physical Therapy Goals        Problem: Physical Therapy Goal    Goal Priority Disciplines Outcome Goal Variances Interventions   Physical Therapy Goal     PT, PT/OT Ongoing, Progressing     Description: Goals to be met by: 2020     Patient will increase functional independence with mobility by performin. Supine to sit with Moderate Assistance  2. Sit to supine with Moderate Assistance  3. Sit to stand transfer with Moderate Assistance  4. Bed to chair transfer with Moderate Assistance using LRAD  5. Gait x 10 feet with modA and appropriate AD                       Time Tracking:       PT Received On: 20  PT Start Time: 1331     PT Stop Time: 1356  PT Total Time (min): 25 min     Billable Minutes: Neuromuscular Re-education 25    Additional staff present: Co-treatment performed with OT this visit to accommodate for patients multiple deficits and  co-morbidities requiring two skilled therapists. Co-treatment medically necessary to appropriately progress patient strength and endurance, to safely facilitate neuromuscular postural balance and control, and for appropriate exercise dosing based on the individuals activity tolerance.      LEILA KONG, PT  12/01/2020   Pager # [phone]

## 2020-12-01 NOTE — PLAN OF CARE
Pt. Aaox4 with delayed responses. VSS. Morelos catheter cdi. No acute changes overnight. Safety precautions maintained. Wctm.

## 2020-12-01 NOTE — PT/OT/SLP PROGRESS
"Occupational Therapy   Treatment    Name: Ciara Lozano  MRN: [id number]  Admitting Diagnosis:  Acute cystitis        Co-treat with PT due to pt's decreased activity tolerance    Recommendations:     Discharge Recommendations: nursing facility, skilled  Discharge Equipment Recommendations:  other (see comments)(TBD)  Barriers to discharge:  Decreased caregiver support    Assessment:     Ciara Lozano is a 85 y.o. female with a medical diagnosis of Acute cystitis.  Pt with fair tolerance of session due to poor participation and minimal progress toward goals.  She required total assistance of 2 people for bed mobility.  Pt sat EOB x ~15 min and refused to perform sit to stand transfer despite maximal encouragement from OT, PT, and RN.  She attempted to return to supine on more than one occasion, requiring cues from therapist to remain upright.  Pt became increasingly agitated and was returned to supine.  Intermittent confusion noted and HR elevated to the 110s.  She presents with the following deficits. Performance deficits affecting function are weakness, impaired endurance, impaired self care skills, impaired functional mobilty, impaired balance, impaired cognition.     Rehab Prognosis:  Fair; patient would benefit from acute skilled OT services to address these deficits and reach maximum level of function.       Plan:     Patient to be seen 3 x/week to address the above listed problems via self-care/home management, therapeutic activities, therapeutic exercises  · Plan of Care Expires: 12/29/20  · Plan of Care Reviewed with: patient    Subjective   "Watch your hand on my back."  "Just leave me be."  "Give me more time." - to perform transfer after 15 min had elapsed.   Pain/Comfort:  Pain Rating 1: other (see comments)(Pt did not report pain.)    Objective:     Communicated with: RN and PT prior to session.  Patient found HOB elevated with cervantes catheter, bed alarm(Visi monitor) upon OT entry to room.    General " Precautions: Standard, fall   Orthopedic Precautions:N/A   Braces: N/A     Occupational Performance:     Bed Mobility:    · Patient completed Rolling/Turning to Right with total assistance and 2 persons  · Patient completed Scooting/Bridging to EOB while sitting to place feet on the floor and to HOB while supine via draw sheet with total assistance and 2 persons  · Patient completed Supine to Sit with total assistance and 2 persons  · Patient completed Sit to Supine with total assistance and 2 persons     Functional Mobility/Transfers:  · Pt sat EOB x ~15 min with CGA-SBA.  Therapists attempted sit to stand transfer with pt.  However, she refused despite maximal coaxing from OT, PT, and RN in the room.      Activities of Daily Living:  · Toileting: Morelos catheter to urinate. Pt attempted to pull/adjust catheter on more than one occasion, requiring cue from therapist.      [facility] 6 Click ADL: 12    Treatment & Education:  - Pt edu on role of OT, POC, and benefit of performing OOB activity.    Patient left HOB elevated with all lines intact, call button in reach, bed alarm on and RN notifiedEducation:      GOALS:   Multidisciplinary Problems     Occupational Therapy Goals        Problem: Occupational Therapy Goal    Goal Priority Disciplines Outcome Interventions   Occupational Therapy Goal     OT, PT/OT Ongoing, Progressing    Description: Goals to be met by: 12/13/20     Patient will increase functional independence with ADLs by performing:    Feeding with Set-up Assistance.  UE Dressing with Stand-by Assistance.  LE Dressing with Moderate Assistance.  Grooming while standing with Minimal Assistance.  Toileting from bedside commode with Moderate Assistance for hygiene and clothing management.   Toilet transfer to bedside commode with Minimal Assistance.                     Time Tracking:     OT Date of Treatment: 12/01/20  OT Start Time: 1331  OT Stop Time: 1356  OT Total Time (min): 25 min    Billable  Minutes:Therapeutic Activity 25 min.    Bienvenido Albarran, OT  12/1/2020

## 2020-12-01 NOTE — PLAN OF CARE
11/30/20 1600   Discharge Assessment   Assessment Type Discharge Planning Assessment   Assessment information obtained from? Medical Record   Prior to hospitilization cognitive status: Unable to Assess   Prior to hospitalization functional status: Assistive Equipment;Needs Assistance   Current cognitive status: Unable to Assess   Current Functional Status: Assistive Equipment;Needs Assistance   Facility Arrived From: home   Lives With alone   Able to Return to Prior Arrangements   (TBD)   Is patient able to care for self after discharge? Unable to determine at this time (comments)   Patient's perception of discharge disposition skilled nursing facility   Readmission Within the Last 30 Days previous discharge plan unsuccessful   If yes, most recent facility name: [facility]   Patient currently being followed by outpatient case management? Unable to determine (comments)   Equipment Currently Used at Home cane, straight   Do you have any problems affording any of your prescribed medications? No   Is the patient taking medications as prescribed? yes   Does the patient have transportation home?   (TBD)   Does the patient receive services at the [facility]? No   Discharge Plan A Skilled Nursing Facility   Discharge Plan B Home Health   DME Needed Upon Discharge    (TBD)   Patient/Family in Agreement with Plan unable to assess   Readmission Questionnaire   At the time of your discharge, did someone talk to you about what your health problems were? Yes   At the time of discharge, did someone talk to you about what to watch out for regarding worsening of your health problem? Yes   At the time of discharge, did someone talk to you about what to do if you experienced worsening of your health problem? Yes   At the time of discharge, did someone talk to you about which medication to take when you left the hospital and which ones to stop taking? Yes   At the time of discharge, did someone talk to you about when and where to  follow up with a doctor after you left the hospital? Yes   Do you have problems taking your medications as prescribed? No   Do you have any problems affording any of  your prescribed medications? No   Do you have problems obtaining/receiving your medications? No

## 2020-12-01 NOTE — PROGRESS NOTES
"Progress Note  Hospital Medicine       Patient Name: Ciara Lozano  MRN:  [id number]  [facility] Medicine Team: [facility] K Annel Sanchez MD  Date of Admission:  11/27/2020     Principal Problem:  Acute cystitis   Primary Care Physician: Andreina Gonzales MD      Interval history       She awakens voluntarily to myself and nurses and choses to sleep. Nursing tried to get in chair today to stimulate her mentally but she was not into it and didn't want to. Labs imprved- hg stable so started plavix for known CAD. Carotid dx. Cr improved. Eating well. Bicarb improving with bicarb tabs, may be able to stop them now that Cr better, would watch. BM yesterday. On room air, sats 98%  Called granddaughter to tell her above. She said she does thi a lot at home and choses to sleep and voluntarily not talk to people, says she does the "eye lift" on one side and then closes them again so shes aware this is subjective now. On toprol but lisinopril held, BP stable 120s-140s as low on admit, trend further if can tolerate home ace/arb for proteinuria/chronic HF now that cr much better, likely can do prior to dc    She states if O-snf isnt available her other preferences per granddaughter are [facility] and [facility] snf. She has medicare straight so CM yesterday was sending referrals for us, will ask to send to these also          Review of Systems     Unable to obtain due to AMS      Past Medical History: Patient has a past medical history of CAD (coronary artery disease) (8/12/2014), Carotid artery disease, Congestive heart failure, unspecified, Encounter for blood transfusion, Hemangioma (4/2/2013), Hyperlipidemia, Hypertension, Hypothyroidism (11/27/2020), Peripheral vascular disease, and Syncope and collapse.    Past Surgical History: Patient has a past surgical history that includes cataracts; Cardiac catheterization; and Coronary angioplasty.    Social History: Patient reports that she quit smoking about 25 years ago. She " has a 5.00 pack-year smoking history. She does not have any smokeless tobacco history on file. She reports that she does not drink alcohol or use drugs.    Family History: family history includes Anuerysm in her sister; Hypertension in her father and mother; No Known Problems in her brother, maternal aunt, maternal grandfather, maternal grandmother, maternal uncle, paternal aunt, paternal grandfather, paternal grandmother, paternal uncle, and sister.    Medications: Scheduled Meds:   atorvastatin  80 mg Oral QHS    ciprofloxacin HCl  500 mg Oral Q24H    clopidogreL  75 mg Oral Daily    ferrous sulfate  325 mg Oral Daily    levothyroxine  50 mcg Oral Before breakfast    metoprolol tartrate  12.5 mg Oral BID    mupirocin   Nasal BID    polyethylene glycol  17 g Oral Daily    senna-docusate 8.6-50 mg  1 tablet Oral BID    sodium bicarbonate  650 mg Oral TID     Continuous Infusions:    PRN Meds:.acetaminophen, dextrose 50%, dextrose 50%, glucagon (human recombinant), glucose, glucose, melatonin, melatonin, nitroGLYCERIN, ondansetron, sodium chloride 0.9%, sodium chloride 0.9%    Allergies: Patient is allergic to pollen extracts.    Physical Exam:     Vital Signs (Most Recent):  Temp: 97.6 °F (36.4 °C) (12/01/20 0300)  Pulse: 88 (12/01/20 0734)  Resp: 20 (12/01/20 0734)  BP: 108/61 (12/01/20 0734)  SpO2: 99 % (12/01/20 0734) Vital Signs Range (Last 24H):  Temp:  [97.6 °F (36.4 °C)-98.6 °F (37 °C)]   Pulse:  []   Resp:  [13-20]   BP: (108-143)/(57-85)   SpO2:  [95 %-99 %]    Body mass index is 29.72 kg/m².     Physical Exam:  Constitutional: Appears well-developed and well-nourished. awake when prompted. subejectively sleeps a lot.  Head: Normocephalic and atraumatic.   Mouth/Throat: Oropharynx is clear and moist.   Eyes: EOM are normal. Pupils are equal, round, and reactive to light. No scleral icterus.   Neck: Normal range of motion. Neck supple.   Cardiovascular: Normal rate and regular rhythm.  No  murmur heard.  Pulmonary/Chest: Effort normal and breath sounds normal. No respiratory distress. No wheezes, rales, or rhonchi. .flat in bed on RA.  Abdominal: Soft. Bowel sounds are normal.  No distension or tenderness  : cervantes in place. Urine lighter today.  Musculoskeletal: Normal range of motion. No edema.   Neurological: Alert and oriented to person, place. At times have to have more prompting to get questions out of her but a good bit subjective as easily will answer with heavier prompting  Skin: Skin is warm and dry. Some bruising seen on extremities.   Psychiatric: likely close to baseline again. Answer qeuestions.  Vitals reviewed.    Recent Labs   Lab 11/29/20  0702 11/30/20  0405 12/01/20  0448   WBC 9.24 8.91 9.26   HGB 7.8* 7.8* 8.2*   HCT 26.8* 26.1* 27.9*    198 179       Recent Labs   Lab 11/29/20  0702 11/30/20  0405 12/01/20  0448    136 135*   K 3.4* 3.4* 4.0   * 109 110   CO2 19* 17* 20*   BUN 67* 61* 50*   CREATININE 2.0* 1.5* 1.2   GLU 88 89 85   CALCIUM 8.3* 8.2* 8.6*   MG 1.7 2.0 2.0   PHOS 4.0 2.9 2.6*     Recent Labs   Lab 11/27/20  1232  11/29/20  0702 11/30/20  0405 12/01/20  0448   ALKPHOS 109   < > 97 88 84   ALT 25   < > 22 26 21   AST 37   < > 38 32 23   ALBUMIN 2.7*   < > 1.7* 1.6* 1.6*   PROT 7.4   < > 5.2* 4.9* 5.1*   BILITOT 1.2*   < > 0.4 0.4 0.6   INR 1.2  --   --   --   --     < > = values in this interval not displayed.      Recent Labs   Lab 11/27/20 2001 11/27/20 2119   POCTGLUCOSE 81 119*         Assessment and Plan:     Ms. Ciara Lozano is a 85 y.o. female who presented to [facility] on 11/27/2020 with     Sepsis secondary to acute cystitis  Septic encephalopathy  GNR bacteremia  -UA with leukos, blood, many bacteria  -non septic on exam, WBC 8  -lactic 1.8, procal 1.9  -on rocephin now, no CX with growth in past  -blood CX + with repeat NGTD. and urine CX klebsiella, proteus. Change to cipro on 11/30 per pharmD assistance. Plan for 14 days from  "negative culture. (12/12)    Acute metabolic encephalopathy  -likely from sepsis as well as mild uremia bun 63, baseline 20s from JOHNNY  -IVF to treat JOHNNY/cervantes placed for retention  -treat UTI  -trend. Improves with prompting but has been a chronic issue in last month per discussion with granddaughter and per [facility] notes as well  -had large workup at [facility] earlier this month  -improved greatly 11/29 and 11/30 simliar to other days where some is subjective but is alert, oriented.   -granddaughter confrmed subjectively sleeps at baseline also at home, which she has done here, witnessed by myself and by nursing both when "caught" awake eating, etc.    hypothyrooidism  Not on meds it apears  -tsh low, free t4 wnl but given confusion will treat now. Repeat 6 weeks    HTN  -low BPs overnight likely from volume down. Holding home BP meds, light IVF and cont to reassess to avoid volume overload considering major issues with this earlier in the month at [facility]  -improved with hydration  -currently on just home toprol, home lisinopril still held. And bP improved, stable.    Chronic systolic/diastolic heart failure  -not overloaded now, cxr clear  -hold lasix, arb for johnny still and euvolemic. Will need lasix again at some point given major overload at [facility] eralier in month to prevent any return to hypervolemia  -on room air  -repeat echo improved from previous with known decreased EF but pericardial effusion now small (large previous)    Acute kidney injury  Hydronephrosis  Hematuria  -baseline 1 and at 3 now->2.5--> 2.0 -->1.5-->1.2 now  -fena appears post obstr on admit and hydro on U/S (chronic and cervantes was out on admit)  -cervantes placed with clots/hematuria. Asa/plavix held on admit, plavix resume 12/1 as Hg stable.  -US showing mild hydro (Seen on prev US in past) as well as layering mass- likely blood, and on cervantes placement had hematuria. Earlier in month at [facility] had this, uro consulted, was supposed to have cervantes at home also for " "hydro it appears. Will def need to go home with one now.  -treating UTI  -granddaughter reports she pulled out her cervantes at home she was sent home on and thast when things semeed to "go downhill. Instructed will need to remain in place indefinitely until can f/u with uro as outpatient to have likey cysto/etc to figure out reason for chronic hydro with uro clinic f/u    Anemia of chronic renal disease  -hg 7 in the past, is 8-9 now. Hematuria on admit with some acute blood loss anemia not unexpected from this, asa/plavix held, Hg stable at 8's, so resume plavix to start 12/1.  -iron studies wnl  -required transfusions earlier this month at OSH    Elevated troponin  -chronic issue  -trop .164 and trended downward.. echo pending . 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 will need steady state repeat    Memory issues  -baseline has some and reported at OSH as well  -discussed with granddaughter on 11/28 at bedside    Deconditioning  -PT/OTand SNF rec  -granddaughter agrees with placement, asked cm today to send referrals for her             Diet:  renal  DVT PPx:  ZORAIDA/SCDS      Disposition:  SNF referrals, mental status stability    Likely few day stay for improvements, possible wed-thurs may be med ready    Granddaughter wilson coats is point of contact, discussed on phone 12/1  She prefers backup to O-SNF - [facility] " and [facility]  CM sent referrals pre discussions 11/30 so will ask them to send to these facilities also tomrrow

## 2020-12-01 NOTE — PLAN OF CARE
Problem: Occupational Therapy Goal  Goal: Occupational Therapy Goal  Description: Goals to be met by: 12/13/20     Patient will increase functional independence with ADLs by performing:    Feeding with Set-up Assistance.  UE Dressing with Stand-by Assistance.  LE Dressing with Moderate Assistance.  Grooming while standing with Minimal Assistance.  Toileting from bedside commode with Moderate Assistance for hygiene and clothing management.   Toilet transfer to bedside commode with Minimal Assistance.    Outcome: Ongoing, Progressing    OT goals remain appropriate.    Bienvenido Albarran, OT   12/01/2020

## 2020-12-02 PROBLEM — N17.9 AKI (ACUTE KIDNEY INJURY): Status: RESOLVED | Noted: 2020-11-27 | Resolved: 2020-12-02

## 2020-12-02 PROBLEM — N18.31 ACUTE RENAL FAILURE SUPERIMPOSED ON STAGE 3A CHRONIC KIDNEY DISEASE: Status: ACTIVE | Noted: 2020-11-27

## 2020-12-02 PROBLEM — G93.41 ACUTE METABOLIC ENCEPHALOPATHY: Status: ACTIVE | Noted: 2020-11-27

## 2020-12-02 PROBLEM — B96.1 BACTEREMIA DUE TO KLEBSIELLA PNEUMONIAE: Status: ACTIVE | Noted: 2020-12-01

## 2020-12-02 PROBLEM — N17.9 ACUTE RENAL FAILURE SUPERIMPOSED ON STAGE 3A CHRONIC KIDNEY DISEASE: Status: ACTIVE | Noted: 2020-11-27

## 2020-12-02 PROBLEM — R33.9 CHRONIC RETENTION OF URINE: Status: RESOLVED | Noted: 2020-12-01 | Resolved: 2020-12-02

## 2020-12-02 PROBLEM — G93.41 ACUTE METABOLIC ENCEPHALOPATHY: Status: RESOLVED | Noted: 2020-11-27 | Resolved: 2020-12-02

## 2020-12-02 PROBLEM — N13.30 HYDRONEPHROSIS: Status: ACTIVE | Noted: 2020-12-02

## 2020-12-02 LAB
ACANTHOCYTES BLD QL SMEAR: PRESENT
ALBUMIN SERPL BCP-MCNC: 1.5 G/DL (ref 3.5–5.2)
ALP SERPL-CCNC: 80 U/L (ref 55–135)
ALT SERPL W/O P-5'-P-CCNC: 17 U/L (ref 10–44)
ANION GAP SERPL CALC-SCNC: 9 MMOL/L (ref 8–16)
ANISOCYTOSIS BLD QL SMEAR: ABNORMAL
AST SERPL-CCNC: 23 U/L (ref 10–40)
BASOPHILS NFR BLD: 0 % (ref 0–1.9)
BILIRUB SERPL-MCNC: 0.6 MG/DL (ref 0.1–1)
BUN SERPL-MCNC: 42 MG/DL (ref 8–23)
BURR CELLS BLD QL SMEAR: ABNORMAL
CALCIUM SERPL-MCNC: 8.2 MG/DL (ref 8.7–10.5)
CHLORIDE SERPL-SCNC: 111 MMOL/L (ref 95–110)
CO2 SERPL-SCNC: 18 MMOL/L (ref 23–29)
CREAT SERPL-MCNC: 1.1 MG/DL (ref 0.5–1.4)
DIFFERENTIAL METHOD: ABNORMAL
EOSINOPHIL NFR BLD: 0 % (ref 0–8)
ERYTHROCYTE [DISTWIDTH] IN BLOOD BY AUTOMATED COUNT: 20.3 % (ref 11.5–14.5)
EST. GFR  (AFRICAN AMERICAN): 53.3 ML/MIN/1.73 M^2
EST. GFR  (NON AFRICAN AMERICAN): 46.2 ML/MIN/1.73 M^2
GLUCOSE SERPL-MCNC: 79 MG/DL (ref 70–110)
HCT VFR BLD AUTO: 26.2 % (ref 37–48.5)
HGB BLD-MCNC: 7.8 G/DL (ref 12–16)
HYPOCHROMIA BLD QL SMEAR: ABNORMAL
IMM GRANULOCYTES # BLD AUTO: ABNORMAL K/UL (ref 0–0.04)
IMM GRANULOCYTES NFR BLD AUTO: ABNORMAL % (ref 0–0.5)
LYMPHOCYTES NFR BLD: 10 % (ref 18–48)
MAGNESIUM SERPL-MCNC: 1.9 MG/DL (ref 1.6–2.6)
MCH RBC QN AUTO: 25.8 PG (ref 27–31)
MCHC RBC AUTO-ENTMCNC: 29.8 G/DL (ref 32–36)
MCV RBC AUTO: 87 FL (ref 82–98)
MONOCYTES NFR BLD: 4 % (ref 4–15)
MYELOCYTES NFR BLD MANUAL: 2 %
NEUTROPHILS NFR BLD: 83 % (ref 38–73)
NEUTS BAND NFR BLD MANUAL: 1 %
NRBC BLD-RTO: 0 /100 WBC
OVALOCYTES BLD QL SMEAR: ABNORMAL
PHOSPHATE SERPL-MCNC: 2.5 MG/DL (ref 2.7–4.5)
PLATELET # BLD AUTO: 163 K/UL (ref 150–350)
PLATELET BLD QL SMEAR: ABNORMAL
PMV BLD AUTO: 12.4 FL (ref 9.2–12.9)
POIKILOCYTOSIS BLD QL SMEAR: ABNORMAL
POLYCHROMASIA BLD QL SMEAR: ABNORMAL
POTASSIUM SERPL-SCNC: 4.2 MMOL/L (ref 3.5–5.1)
PROT SERPL-MCNC: 4.8 G/DL (ref 6–8.4)
RBC # BLD AUTO: 3.02 M/UL (ref 4–5.4)
SCHISTOCYTES BLD QL SMEAR: ABNORMAL
SCHISTOCYTES BLD QL SMEAR: PRESENT
SODIUM SERPL-SCNC: 138 MMOL/L (ref 136–145)
TOXIC GRANULES BLD QL SMEAR: PRESENT
WBC # BLD AUTO: 8.46 K/UL (ref 3.9–12.7)

## 2020-12-02 PROCEDURE — 83735 ASSAY OF MAGNESIUM: CPT

## 2020-12-02 PROCEDURE — 36415 COLL VENOUS BLD VENIPUNCTURE: CPT

## 2020-12-02 PROCEDURE — 30200315 PPD INTRADERMAL TEST REV CODE 302: Performed by: HOSPITALIST

## 2020-12-02 PROCEDURE — 86580 TB INTRADERMAL TEST: CPT | Performed by: HOSPITALIST

## 2020-12-02 PROCEDURE — 85007 BL SMEAR W/DIFF WBC COUNT: CPT

## 2020-12-02 PROCEDURE — 20600001 HC STEP DOWN PRIVATE ROOM

## 2020-12-02 PROCEDURE — 25000003 PHARM REV CODE 250: Performed by: HOSPITALIST

## 2020-12-02 PROCEDURE — 85027 COMPLETE CBC AUTOMATED: CPT

## 2020-12-02 PROCEDURE — 99232 PR SUBSEQUENT HOSPITAL CARE,LEVL II: ICD-10-PCS | Mod: ,,, | Performed by: HOSPITALIST

## 2020-12-02 PROCEDURE — U0003 INFECTIOUS AGENT DETECTION BY NUCLEIC ACID (DNA OR RNA); SEVERE ACUTE RESPIRATORY SYNDROME CORONAVIRUS 2 (SARS-COV-2) (CORONAVIRUS DISEASE [COVID-19]), AMPLIFIED PROBE TECHNIQUE, MAKING USE OF HIGH THROUGHPUT TECHNOLOGIES AS DESCRIBED BY CMS-2020-01-R: HCPCS

## 2020-12-02 PROCEDURE — 99232 SBSQ HOSP IP/OBS MODERATE 35: CPT | Mod: ,,, | Performed by: HOSPITALIST

## 2020-12-02 PROCEDURE — 80053 COMPREHEN METABOLIC PANEL: CPT

## 2020-12-02 PROCEDURE — 84100 ASSAY OF PHOSPHORUS: CPT

## 2020-12-02 RX ADMIN — TUBERCULIN PURIFIED PROTEIN DERIVATIVE 5 UNITS: 5 INJECTION, SOLUTION INTRADERMAL at 05:12

## 2020-12-02 RX ADMIN — METOPROLOL TARTRATE 12.5 MG: 25 TABLET, FILM COATED ORAL at 08:12

## 2020-12-02 RX ADMIN — LEVOTHYROXINE SODIUM 50 MCG: 50 TABLET ORAL at 06:12

## 2020-12-02 RX ADMIN — ATORVASTATIN CALCIUM 80 MG: 20 TABLET, FILM COATED ORAL at 08:12

## 2020-12-02 RX ADMIN — CLOPIDOGREL 75 MG: 75 TABLET, FILM COATED ORAL at 08:12

## 2020-12-02 RX ADMIN — FERROUS SULFATE TAB EC 325 MG (65 MG FE EQUIVALENT) 325 MG: 325 (65 FE) TABLET DELAYED RESPONSE at 08:12

## 2020-12-02 RX ADMIN — MUPIROCIN: 20 OINTMENT TOPICAL at 08:12

## 2020-12-02 RX ADMIN — CIPROFLOXACIN 500 MG: 500 TABLET, FILM COATED ORAL at 08:12

## 2020-12-02 RX ADMIN — SODIUM BICARBONATE 650 MG TABLET 650 MG: at 08:12

## 2020-12-02 RX ADMIN — SODIUM BICARBONATE 650 MG TABLET 650 MG: at 02:12

## 2020-12-02 RX ADMIN — DOCUSATE SODIUM 50MG AND SENNOSIDES 8.6MG 1 TABLET: 8.6; 5 TABLET, FILM COATED ORAL at 08:12

## 2020-12-02 RX ADMIN — POLYETHYLENE GLYCOL 3350 17 G: 17 POWDER, FOR SOLUTION ORAL at 08:12

## 2020-12-02 NOTE — PLAN OF CARE
Pt with improving spontaneous alertness overnight. Consistently making needs known. Pt reporting tenderness at LPIV site, unresolved with elevation and heat. Flushes without issue. Attempt x 1 to place PIV overnight, pt did not tolerated, would not hold limb still and attempting to push RN away.     Will attempt new IV placement when pt is more cooperative. Plan to continue monitoring neurologic status and encourage cooperative behaviors to facilitate discharge planning.

## 2020-12-02 NOTE — PLAN OF CARE
SW telephoned pt's granddaughter at [phone] to answer questions and concerns.  Unable to get through, left voice message requesting a return call.      GLADYS sent SNF referrals to the following facilities via  per pt's choice:  [facility] and [facility] at [phone].  GLADYS will continue to monitor case status.      12/02/20 1404   Post-Acute Status   Post-Acute Authorization Placement   Post-Acute Placement Status Referrals Sent     Adenike Veloz LMSW  PRN-  [facility]  Ext. [phone]

## 2020-12-02 NOTE — PLAN OF CARE
Patient did not want to be bothered most of the shift. She stopped answering my questions. She would look at me and close her eyes while I was talking to her. Patient Ox4 this morning when she answered my questions. Covid swab obtained and TB placed. Patient refused heel protector boots. MD aware of patients behavior, it is not new. VS stable. Plan of care reviewed.

## 2020-12-02 NOTE — PROGRESS NOTES
Progress Note  Hospital Medicine       Patient Name: Ciara Lozano  MRN:  [id number]  Hospital Medicine Team: [facility] K Annel Sanchez MD  Date of Admission:  11/27/2020     Principal Problem:  Acute cystitis   Primary Care Physician: Andreina Gonzales MD      Interval history     BP stable in 120s/60s, on BB but not on home lisionpril yet, if Bp stable tomorrow would consider lower dose for proteinuria in her, Bm overnight, 800 cc out in cervantes, placed uro referral for hydro seen at [facility] and again here now of unclear etioogy but likely nidus for her UTI and then also her hydro and JOHNNY, improved Cr at 1.1 again today. More awake this AM on exam nrsing bathing her in bed. Subjectively doesn't answer questions a lot and sleeps most of day, more alert today, but this is also her baseline per granddaughter at home a good bit too. Left message updating wilson coats her granddaughter and contact today updating, she requested o-SNF firt then [facility] or [facility], likely too low function for [facility] but has medicare.   Med stable now so waiting for facilities for her, on plavix now, holding asa, hg mild downtick at 7.8 from 8's but no bleeding, hematuria resolved.  Will need cards f/u here also given her [facility] records with isues there. Her skin in hands and legs still very wrinkly, wouldn't start lasix yet but had major volume issues there so will likely need at some pont/lower dose/ on dc to prevent hypervolemia again/effusions from chronic EF 30s.        Review of Systems     Unable to obtain due to AMS      Past Medical History: Patient has a past medical history of CAD (coronary artery disease) (8/12/2014), Carotid artery disease, Congestive heart failure, unspecified, Encounter for blood transfusion, Hemangioma (4/2/2013), Hyperlipidemia, Hypertension, Hypothyroidism (11/27/2020), Peripheral vascular disease, and Syncope and collapse.    Past Surgical History: Patient has a past surgical history that includes  cataracts; Cardiac catheterization; and Coronary angioplasty.    Social History: Patient reports that she quit smoking about 25 years ago. She has a 5.00 pack-year smoking history. She does not have any smokeless tobacco history on file. She reports that she does not drink alcohol or use drugs.    Family History: family history includes Anuerysm in her sister; Hypertension in her father and mother; No Known Problems in her brother, maternal aunt, maternal grandfather, maternal grandmother, maternal uncle, paternal aunt, paternal grandfather, paternal grandmother, paternal uncle, and sister.    Medications: Scheduled Meds:   atorvastatin  80 mg Oral QHS    ciprofloxacin HCl  500 mg Oral Q12H    clopidogreL  75 mg Oral Daily    ferrous sulfate  325 mg Oral Daily    levothyroxine  50 mcg Oral Before breakfast    metoprolol tartrate  12.5 mg Oral BID    mupirocin   Nasal BID    polyethylene glycol  17 g Oral Daily    senna-docusate 8.6-50 mg  1 tablet Oral BID    sodium bicarbonate  650 mg Oral TID    tuberculin  5 Units Intradermal Once     Continuous Infusions:    PRN Meds:.acetaminophen, dextrose 50%, dextrose 50%, glucagon (human recombinant), glucose, glucose, melatonin, melatonin, nitroGLYCERIN, ondansetron, sodium chloride 0.9%, sodium chloride 0.9%    Allergies: Patient is allergic to pollen extracts.    Physical Exam:     Vital Signs (Most Recent):  Temp: 98.1 °F (36.7 °C) (12/02/20 0738)  Pulse: 85 (12/02/20 1227)  Resp: 18 (12/02/20 1227)  BP: 120/63 (12/02/20 1227)  SpO2: 98 % (12/02/20 1227) Vital Signs Range (Last 24H):  Temp:  [96.4 °F (35.8 °C)-98.3 °F (36.8 °C)]   Pulse:  [77-93]   Resp:  [16-29]   BP: (110-123)/(57-70)   SpO2:  [96 %-99 %]    Body mass index is 29.72 kg/m².     Physical Exam:  Constitutional: Appears well-developed and well-nourished. awake when prompted. subejectively sleeps a lot.  Head: Normocephalic and atraumatic.   Mouth/Throat: Oropharynx is clear and moist.   Eyes:  EOM are normal. Pupils are equal, round, and reactive to light. No scleral icterus.   Neck: Normal range of motion. Neck supple.   Cardiovascular: Normal rate and regular rhythm.  No murmur heard.  Pulmonary/Chest: Effort normal and breath sounds normal. No respiratory distress. No wheezes, rales, or rhonchi. .flat in bed on RA.  Abdominal: Soft. Bowel sounds are normal.  No distension or tenderness  : cervantes in place. Urine lighter today.  Musculoskeletal: Normal range of motion. No edema.   Neurological: Alert and oriented to person, place. At times have to have more prompting to get questions out of her but a good bit subjective as easily will answer with heavier prompting  Skin: Skin is warm and dry. Some bruising seen on extremities. wrinkled hands, legs, not overloaded at all.  Psychiatric: lclose to baseline again. Answer qeuestions.  Vitals reviewed.    Recent Labs   Lab 11/30/20 0405 12/01/20 0448 12/02/20 0456   WBC 8.91 9.26 8.46   HGB 7.8* 8.2* 7.8*   HCT 26.1* 27.9* 26.2*    179 163       Recent Labs   Lab 11/30/20 0405 12/01/20 0448 12/02/20 0456    135* 138   K 3.4* 4.0 4.2    110 111*   CO2 17* 20* 18*   BUN 61* 50* 42*   CREATININE 1.5* 1.2 1.1   GLU 89 85 79   CALCIUM 8.2* 8.6* 8.2*   MG 2.0 2.0 1.9   PHOS 2.9 2.6* 2.5*     Recent Labs   Lab 11/27/20  1232  11/30/20  0405 12/01/20 0448 12/02/20  0456   ALKPHOS 109   < > 88 84 80   ALT 25   < > 26 21 17   AST 37   < > 32 23 23   ALBUMIN 2.7*   < > 1.6* 1.6* 1.5*   PROT 7.4   < > 4.9* 5.1* 4.8*   BILITOT 1.2*   < > 0.4 0.6 0.6   INR 1.2  --   --   --   --     < > = values in this interval not displayed.      Recent Labs   Lab 11/27/20 2001 11/27/20 2119   POCTGLUCOSE 81 119*         Assessment and Plan:     Ms. Ciara Lozano is a 85 y.o. female who presented to [facility] on 11/27/2020 with     Sepsis secondary to acute cystitis  Septic encephalopathy  GNR bacteremia  -UA with leukos, blood, many bacteria  -non septic on  "exam, WBC 8  -lactic 1.8, procal 1.9  -on rocephin now, no CX with growth in past  -blood CX + with repeat NGTD. and urine CX klebsiella, proteus. Change to cipro on 11/30 per pharmD assistance. Plan for 14 days from negative culture. (12/12)    Acute metabolic encephalopathy  -likely from sepsis as well as mild uremia bun 63, baseline 20s from JOHNNY  -IVF to treat JOHNNY/cervantes placed for retention  -treat UTI  -trend. Improves with prompting but has been a chronic issue in last month per discussion with granddaughter and per [facility] notes as well  -had large workup at [facility] earlier this month  -improved greatly 11/29 and 11/30 simliar to other days where some is subjective but is alert, oriented.   -granddaughter confrmed subjectively sleeps at baseline also at home, which she has done here, witnessed by myself and by nursing both when "caught" awake eating, etc.  -more awake today 12/2 but speaks only sometimes when she wants to.    hypothyrooidism  Not on meds it apears  -tsh low, free t4 wnl but given confusion will treat now. Repeat 6 weeks    HTN  -low BPs overnight likely from volume down. Holding home BP meds, light IVF and cont to reassess to avoid volume overload considering major issues with this earlier in the month at [facility]  -improved with hydration  -currently on just home toprol, home lisinopril still held. And bP improved, stable.  12/2: consider adding low dose lisinopril back for secondary prevention with HF and proteinuria on dc as BP 120s and can likely tolerate low dose.    Chronic systolic/diastolic heart failure  -not overloaded now, cxr clear  -on admit lasix, arb for johnny still and euvolemic. Will need lasix again at some point given major overload at [facility] eralier in month to prevent any return to hypervolemia  -on room air throughout stay and now also.  -repeat echo improved from previous with known decreased EF but pericardial effusion now small (large previous)  On BB again now, will need lasix on dc " "likely lower dose but still no signs of overload. Would consider low dose lisinopril resuming on dc.    Acute kidney injury  Hydronephrosis  Hematuria  -baseline 1 and at 3 now->2.5--> 2.0 -->1.5-->1.1 now  -fena appears post obstr on admit and hydro on U/S (chronic and cervantes was out on admit)  -cervantes placed with clots/hematuria. Asa/plavix held on admit, plavix resume 12/1 as Hg stable.  -US showing mild hydro (Seen on prev US in past) as well as layering mass- likely blood, and on cervantes placement had hematuria. Earlier in month at [facility] had this, uro consulted, was supposed to have cervantes at home also for hydro it appears. Will def need to go home with one now.  -treating UTI  -granddaughter reports she pulled out her cervantes at home she was sent home on and thast when things semeed to "go downhill. Instructed will need to remain in place indefinitely until can f/u with uro as outpatient to have likey cysto/etc to figure out reason for chronic hydro with uro clinic f/u  -placed uro referral 12/2    Anemia of chronic renal disease  -hg 7 in the past, is 8-9 now. Hematuria on admit with some acute blood loss anemia not unexpected from this, asa/plavix held, Hg stable at 8's, so resume plavix to start 12/1.  -iron studies wnl  -required transfusions earlier this month at OSH  7.8 on 12/2    Elevated troponin  -chronic issue  -trop .164 and trended downward.. echo pending . 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 will need steady state repeat    Memory issues  -baseline has some and reported at OSH as well  -discussed with granddaughter on 11/28 at bedside    Deconditioning  -PT/OTand SNF rec  -granddaughter agrees with placement, asked cm today to send referrals for her. Working on SNF now with CM             Diet:  renal  DVT PPx:  ZORAIDA/SCDS      Disposition:  SNF referrals, stable for dc pending facility  uro referral placed  Will place cards to reestablish care      Granddaughter wilson coats is point of contact, left message for her 12/2 with updates    She prefers backup to [facility] and [facility]  CM sent referrals    Discharge Planning   ADRYAN: 12/3    Code Status: Full Code   Is the patient medically ready for discharge?: Yes    Reason for patient still in hospital (select all that apply): Pending disposition placemet  Discharge Plan A: Skilled Nursing Facility

## 2020-12-02 NOTE — PLAN OF CARE
GLADYS completed LOCET via phone and faxed PASRR to [facility] (fx. [phone]) to obtain the 142 for NH admission.     12/02/20 1254   Post-Acute Status   Post-Acute Authorization Placement   Post-Acute Placement Status Pending State Certification     Adenike Veloz LMSW  PRN-  [facility]  Ext. [phone]

## 2020-12-02 NOTE — PLAN OF CARE
Problem: Adult Inpatient Plan of Care  Goal: Plan of Care Review  Outcome: Ongoing, Progressing  Goal: Patient-Specific Goal (Individualization)  Outcome: Ongoing, Progressing  Flowsheets (Taken 12/1/2020 1829)  Individualized Care Needs: pt likes lights off so she can sleep  Anxieties, Fears or Concerns: being forced to ambulate  Patient-Specific Goals (Include Timeframe): to go home  Goal: Optimal Comfort and Wellbeing  Outcome: Ongoing, Progressing  Intervention: Provide Person-Centered Care  Flowsheets (Taken 12/1/2020 1829)  Trust Relationship/Rapport:   questions answered   care explained   emotional support provided   empathic listening provided   choices provided   questions encouraged   reassurance provided   thoughts/feelings acknowledged     Pt oriented x4, arouses to voice, VSS, denies pain. Pt refused to ambulate with PT/OT today. Pt refuses to sit up in chair. Pt educated on importance of getting out of bed. Pt may possibly need SNIFF at discharge. Plan of care reviewed with pt.

## 2020-12-03 PROBLEM — N18.31 ACUTE RENAL FAILURE SUPERIMPOSED ON STAGE 3A CHRONIC KIDNEY DISEASE: Status: RESOLVED | Noted: 2020-11-27 | Resolved: 2020-12-03

## 2020-12-03 PROBLEM — N13.39 OTHER HYDRONEPHROSIS: Status: ACTIVE | Noted: 2020-12-02

## 2020-12-03 PROBLEM — N17.9 ACUTE RENAL FAILURE SUPERIMPOSED ON STAGE 3A CHRONIC KIDNEY DISEASE: Status: RESOLVED | Noted: 2020-11-27 | Resolved: 2020-12-03

## 2020-12-03 LAB
ALBUMIN SERPL BCP-MCNC: 1.6 G/DL (ref 3.5–5.2)
ALP SERPL-CCNC: 80 U/L (ref 55–135)
ALT SERPL W/O P-5'-P-CCNC: 16 U/L (ref 10–44)
ANION GAP SERPL CALC-SCNC: 7 MMOL/L (ref 8–16)
ANISOCYTOSIS BLD QL SMEAR: SLIGHT
AST SERPL-CCNC: 25 U/L (ref 10–40)
BACTERIA BLD CULT: NORMAL
BACTERIA BLD CULT: NORMAL
BASOPHILS # BLD AUTO: ABNORMAL K/UL (ref 0–0.2)
BASOPHILS NFR BLD: 0 % (ref 0–1.9)
BILIRUB SERPL-MCNC: 0.6 MG/DL (ref 0.1–1)
BUN SERPL-MCNC: 35 MG/DL (ref 8–23)
BURR CELLS BLD QL SMEAR: ABNORMAL
CALCIUM SERPL-MCNC: 8.1 MG/DL (ref 8.7–10.5)
CHLORIDE SERPL-SCNC: 110 MMOL/L (ref 95–110)
CO2 SERPL-SCNC: 21 MMOL/L (ref 23–29)
CREAT SERPL-MCNC: 1 MG/DL (ref 0.5–1.4)
DIFFERENTIAL METHOD: ABNORMAL
EOSINOPHIL # BLD AUTO: ABNORMAL K/UL (ref 0–0.5)
EOSINOPHIL NFR BLD: 1 % (ref 0–8)
ERYTHROCYTE [DISTWIDTH] IN BLOOD BY AUTOMATED COUNT: 20.6 % (ref 11.5–14.5)
EST. GFR  (AFRICAN AMERICAN): 59.8 ML/MIN/1.73 M^2
EST. GFR  (NON AFRICAN AMERICAN): 51.9 ML/MIN/1.73 M^2
GLUCOSE SERPL-MCNC: 89 MG/DL (ref 70–110)
HCT VFR BLD AUTO: 25.1 % (ref 37–48.5)
HGB BLD-MCNC: 7.5 G/DL (ref 12–16)
HYPOCHROMIA BLD QL SMEAR: ABNORMAL
IMM GRANULOCYTES # BLD AUTO: ABNORMAL K/UL (ref 0–0.04)
IMM GRANULOCYTES NFR BLD AUTO: ABNORMAL % (ref 0–0.5)
LYMPHOCYTES # BLD AUTO: ABNORMAL K/UL (ref 1–4.8)
LYMPHOCYTES NFR BLD: 8 % (ref 18–48)
MAGNESIUM SERPL-MCNC: 1.8 MG/DL (ref 1.6–2.6)
MCH RBC QN AUTO: 24.9 PG (ref 27–31)
MCHC RBC AUTO-ENTMCNC: 29.9 G/DL (ref 32–36)
MCV RBC AUTO: 83 FL (ref 82–98)
METAMYELOCYTES NFR BLD MANUAL: 1 %
MONOCYTES # BLD AUTO: ABNORMAL K/UL (ref 0.3–1)
MONOCYTES NFR BLD: 5 % (ref 4–15)
MYELOCYTES NFR BLD MANUAL: 1 %
NEUTROPHILS NFR BLD: 84 % (ref 38–73)
NRBC BLD-RTO: 0 /100 WBC
OVALOCYTES BLD QL SMEAR: ABNORMAL
PHOSPHATE SERPL-MCNC: 2.1 MG/DL (ref 2.7–4.5)
PLATELET # BLD AUTO: 161 K/UL (ref 150–350)
PLATELET BLD QL SMEAR: ABNORMAL
PMV BLD AUTO: 11.5 FL (ref 9.2–12.9)
POIKILOCYTOSIS BLD QL SMEAR: SLIGHT
POLYCHROMASIA BLD QL SMEAR: ABNORMAL
POTASSIUM SERPL-SCNC: 3.9 MMOL/L (ref 3.5–5.1)
PROT SERPL-MCNC: 5 G/DL (ref 6–8.4)
RBC # BLD AUTO: 3.01 M/UL (ref 4–5.4)
SARS-COV-2 RNA RESP QL NAA+PROBE: NOT DETECTED
SODIUM SERPL-SCNC: 138 MMOL/L (ref 136–145)
WBC # BLD AUTO: 9.28 K/UL (ref 3.9–12.7)

## 2020-12-03 PROCEDURE — 83735 ASSAY OF MAGNESIUM: CPT

## 2020-12-03 PROCEDURE — 25000003 PHARM REV CODE 250: Performed by: HOSPITALIST

## 2020-12-03 PROCEDURE — 85027 COMPLETE CBC AUTOMATED: CPT

## 2020-12-03 PROCEDURE — 99232 SBSQ HOSP IP/OBS MODERATE 35: CPT | Mod: ,,, | Performed by: INTERNAL MEDICINE

## 2020-12-03 PROCEDURE — 20600001 HC STEP DOWN PRIVATE ROOM

## 2020-12-03 PROCEDURE — 85007 BL SMEAR W/DIFF WBC COUNT: CPT

## 2020-12-03 PROCEDURE — 80053 COMPREHEN METABOLIC PANEL: CPT

## 2020-12-03 PROCEDURE — 36415 COLL VENOUS BLD VENIPUNCTURE: CPT

## 2020-12-03 PROCEDURE — 84100 ASSAY OF PHOSPHORUS: CPT

## 2020-12-03 PROCEDURE — 99232 PR SUBSEQUENT HOSPITAL CARE,LEVL II: ICD-10-PCS | Mod: ,,, | Performed by: INTERNAL MEDICINE

## 2020-12-03 RX ADMIN — DOCUSATE SODIUM 50MG AND SENNOSIDES 8.6MG 1 TABLET: 8.6; 5 TABLET, FILM COATED ORAL at 09:12

## 2020-12-03 RX ADMIN — ATORVASTATIN CALCIUM 80 MG: 20 TABLET, FILM COATED ORAL at 08:12

## 2020-12-03 RX ADMIN — FERROUS SULFATE TAB EC 325 MG (65 MG FE EQUIVALENT) 325 MG: 325 (65 FE) TABLET DELAYED RESPONSE at 09:12

## 2020-12-03 RX ADMIN — CIPROFLOXACIN 500 MG: 500 TABLET, FILM COATED ORAL at 09:12

## 2020-12-03 RX ADMIN — METOPROLOL TARTRATE 12.5 MG: 25 TABLET, FILM COATED ORAL at 09:12

## 2020-12-03 RX ADMIN — CLOPIDOGREL 75 MG: 75 TABLET, FILM COATED ORAL at 09:12

## 2020-12-03 RX ADMIN — CIPROFLOXACIN 500 MG: 500 TABLET, FILM COATED ORAL at 08:12

## 2020-12-03 RX ADMIN — SODIUM BICARBONATE 650 MG TABLET 650 MG: at 02:12

## 2020-12-03 RX ADMIN — METOPROLOL TARTRATE 12.5 MG: 25 TABLET, FILM COATED ORAL at 08:12

## 2020-12-03 RX ADMIN — SODIUM BICARBONATE 650 MG TABLET 650 MG: at 08:12

## 2020-12-03 RX ADMIN — LEVOTHYROXINE SODIUM 50 MCG: 50 TABLET ORAL at 06:12

## 2020-12-03 RX ADMIN — SODIUM BICARBONATE 650 MG TABLET 650 MG: at 09:12

## 2020-12-03 NOTE — PLAN OF CARE
"Pt remains stable: intermittently interacting with staff and cooperating with assessments. Reported last night that "I just want to sleep."      Will continue current plan of care and progressing towards discharge.  "

## 2020-12-03 NOTE — PLAN OF CARE
Problem: Adult Inpatient Plan of Care  Goal: Plan of Care Review  Outcome: Ongoing, Progressing  Goal: Patient-Specific Goal (Individualization)  Outcome: Ongoing, Progressing  Flowsheets (Taken 12/3/2020 1629)  Individualized Care Needs: lights off  Anxieties, Fears or Concerns: REZA: pt not answering questions  Patient-Specific Goals (Include Timeframe): REZA  Goal: Optimal Comfort and Wellbeing  Outcome: Ongoing, Progressing  Intervention: Provide Person-Centered Care  Flowsheets (Taken 12/3/2020 1629)  Trust Relationship/Rapport:   care explained   choices provided   emotional support provided   empathic listening provided   questions answered   questions encouraged   reassurance provided   thoughts/feelings acknowledged     Pt oriented x4, arouses to voice, VSS denies pain. No acute  changes today. Pt only answering orientation questions. Pt refusing meals. Adequate urine output noted. Will possibly d/c to SNIFF when appropriate. Plan of care reviewed with pt.

## 2020-12-04 LAB
ALBUMIN SERPL BCP-MCNC: 1.8 G/DL (ref 3.5–5.2)
ALP SERPL-CCNC: 87 U/L (ref 55–135)
ALT SERPL W/O P-5'-P-CCNC: 17 U/L (ref 10–44)
ANION GAP SERPL CALC-SCNC: 6 MMOL/L (ref 8–16)
ANISOCYTOSIS BLD QL SMEAR: SLIGHT
AST SERPL-CCNC: 24 U/L (ref 10–40)
BASOPHILS NFR BLD: 0 % (ref 0–1.9)
BILIRUB SERPL-MCNC: 0.7 MG/DL (ref 0.1–1)
BUN SERPL-MCNC: 26 MG/DL (ref 8–23)
BURR CELLS BLD QL SMEAR: ABNORMAL
CALCIUM SERPL-MCNC: 8.2 MG/DL (ref 8.7–10.5)
CHLORIDE SERPL-SCNC: 108 MMOL/L (ref 95–110)
CO2 SERPL-SCNC: 20 MMOL/L (ref 23–29)
CREAT SERPL-MCNC: 0.8 MG/DL (ref 0.5–1.4)
DIFFERENTIAL METHOD: ABNORMAL
EOSINOPHIL NFR BLD: 0 % (ref 0–8)
ERYTHROCYTE [DISTWIDTH] IN BLOOD BY AUTOMATED COUNT: 20.9 % (ref 11.5–14.5)
EST. GFR  (AFRICAN AMERICAN): >60 ML/MIN/1.73 M^2
EST. GFR  (NON AFRICAN AMERICAN): >60 ML/MIN/1.73 M^2
GLUCOSE SERPL-MCNC: 108 MG/DL (ref 70–110)
HCT VFR BLD AUTO: 29.5 % (ref 37–48.5)
HGB BLD-MCNC: 8.6 G/DL (ref 12–16)
HYPOCHROMIA BLD QL SMEAR: ABNORMAL
IMM GRANULOCYTES # BLD AUTO: ABNORMAL K/UL (ref 0–0.04)
IMM GRANULOCYTES NFR BLD AUTO: ABNORMAL % (ref 0–0.5)
LYMPHOCYTES NFR BLD: 7 % (ref 18–48)
MAGNESIUM SERPL-MCNC: 1.8 MG/DL (ref 1.6–2.6)
MCH RBC QN AUTO: 25.2 PG (ref 27–31)
MCHC RBC AUTO-ENTMCNC: 29.2 G/DL (ref 32–36)
MCV RBC AUTO: 87 FL (ref 82–98)
MONOCYTES NFR BLD: 5 % (ref 4–15)
NEUTROPHILS NFR BLD: 88 % (ref 38–73)
NRBC BLD-RTO: 0 /100 WBC
PHOSPHATE SERPL-MCNC: 2.3 MG/DL (ref 2.7–4.5)
PLATELET # BLD AUTO: 185 K/UL (ref 150–350)
PLATELET BLD QL SMEAR: ABNORMAL
PMV BLD AUTO: 12 FL (ref 9.2–12.9)
POIKILOCYTOSIS BLD QL SMEAR: SLIGHT
POTASSIUM SERPL-SCNC: 4.2 MMOL/L (ref 3.5–5.1)
PROT SERPL-MCNC: 5.9 G/DL (ref 6–8.4)
RBC # BLD AUTO: 3.41 M/UL (ref 4–5.4)
SCHISTOCYTES BLD QL SMEAR: PRESENT
SODIUM SERPL-SCNC: 134 MMOL/L (ref 136–145)
TB INDURATION 48 - 72 HR READ: 0 MM
WBC # BLD AUTO: 9.32 K/UL (ref 3.9–12.7)

## 2020-12-04 PROCEDURE — 99232 SBSQ HOSP IP/OBS MODERATE 35: CPT | Mod: ,,, | Performed by: INTERNAL MEDICINE

## 2020-12-04 PROCEDURE — 36415 COLL VENOUS BLD VENIPUNCTURE: CPT

## 2020-12-04 PROCEDURE — 20600001 HC STEP DOWN PRIVATE ROOM

## 2020-12-04 PROCEDURE — 83735 ASSAY OF MAGNESIUM: CPT

## 2020-12-04 PROCEDURE — 85027 COMPLETE CBC AUTOMATED: CPT

## 2020-12-04 PROCEDURE — 25000003 PHARM REV CODE 250: Performed by: HOSPITALIST

## 2020-12-04 PROCEDURE — 99232 PR SUBSEQUENT HOSPITAL CARE,LEVL II: ICD-10-PCS | Mod: ,,, | Performed by: INTERNAL MEDICINE

## 2020-12-04 PROCEDURE — 84100 ASSAY OF PHOSPHORUS: CPT

## 2020-12-04 PROCEDURE — 80053 COMPREHEN METABOLIC PANEL: CPT

## 2020-12-04 PROCEDURE — 85007 BL SMEAR W/DIFF WBC COUNT: CPT

## 2020-12-04 RX ADMIN — CLOPIDOGREL 75 MG: 75 TABLET, FILM COATED ORAL at 11:12

## 2020-12-04 RX ADMIN — SODIUM BICARBONATE 650 MG TABLET 650 MG: at 08:12

## 2020-12-04 RX ADMIN — SODIUM BICARBONATE 650 MG TABLET 650 MG: at 11:12

## 2020-12-04 RX ADMIN — METOPROLOL TARTRATE 12.5 MG: 25 TABLET, FILM COATED ORAL at 08:12

## 2020-12-04 RX ADMIN — LEVOTHYROXINE SODIUM 50 MCG: 50 TABLET ORAL at 06:12

## 2020-12-04 RX ADMIN — DOCUSATE SODIUM 50MG AND SENNOSIDES 8.6MG 1 TABLET: 8.6; 5 TABLET, FILM COATED ORAL at 08:12

## 2020-12-04 RX ADMIN — METOPROLOL TARTRATE 12.5 MG: 25 TABLET, FILM COATED ORAL at 11:12

## 2020-12-04 RX ADMIN — DOCUSATE SODIUM 50MG AND SENNOSIDES 8.6MG 1 TABLET: 8.6; 5 TABLET, FILM COATED ORAL at 11:12

## 2020-12-04 RX ADMIN — POLYETHYLENE GLYCOL 3350 17 G: 17 POWDER, FOR SOLUTION ORAL at 11:12

## 2020-12-04 RX ADMIN — ATORVASTATIN CALCIUM 80 MG: 20 TABLET, FILM COATED ORAL at 08:12

## 2020-12-04 RX ADMIN — CIPROFLOXACIN 500 MG: 500 TABLET, FILM COATED ORAL at 11:12

## 2020-12-04 RX ADMIN — CIPROFLOXACIN 500 MG: 500 TABLET, FILM COATED ORAL at 08:12

## 2020-12-04 RX ADMIN — FERROUS SULFATE TAB EC 325 MG (65 MG FE EQUIVALENT) 325 MG: 325 (65 FE) TABLET DELAYED RESPONSE at 11:12

## 2020-12-04 RX ADMIN — SODIUM BICARBONATE 650 MG TABLET 650 MG: at 04:12

## 2020-12-04 NOTE — ASSESSMENT & PLAN NOTE
Other hydronephrosis  Patient with JOHNNY on admit and US showed hydronephrosis and Morelos placed and patient hydrated and JOHNNY resolved so likely obstructive uropathy related to urinary retention of unknown cause lead to JOHNNY. Plan to continue Morelos and will continue Morelos on discharge and patient to follow-up with Urology as outpatient.

## 2020-12-04 NOTE — ASSESSMENT & PLAN NOTE
Bacteremia due to Klebsiella pneumoniae and Proteus Mirabilis  Klebsiella and Providencia urinary tract infection  · Sepsis resolved. Infection under control. WBC normal.   · Present on admit. Patient with positive blood culture with both Klebsiella pneumoniae and Proteus mirabilis. Urine culture also positive for Klebsiella and Providencia. Bacteria felt related to urinary source. Patient had hydronephrosis and urinary retention on ultrasound on admit and likely reason patient developed infection subsequently.  · Repeat blood culture son 11/28 were final no growth.  · Patient treated initially with broad spectrum antibiotics but when sensitivities returned on all organisms antibiotic switched to Cipro alone as all organisms sensitive and plan is to treat for a total of 14 days from last negative blood culture on 11/28 with end date of 12/12/2020.

## 2020-12-04 NOTE — ASSESSMENT & PLAN NOTE
BP meds held on admit due to hypotension and JOHNNY and concern for sepsis and volume depletion. Patient restarted on low dose Metoprolol and BP stable. Plan to resume Lisinopril on discharge if BP tolerates. Patient normotensive at this time on Metoprolol alone. Target BP < 140/90.

## 2020-12-04 NOTE — ASSESSMENT & PLAN NOTE
Present on admit. PT/OT consulted and recommending SNF placement on discharge and CM/SW working with family on SNF placement for hospital discharge.

## 2020-12-04 NOTE — SUBJECTIVE & OBJECTIVE
Interval History: Patient very awake and alert this am and conversant. Patient getting repositioned in bed by nursing. Patient denies any pain and was asking about what caused her to get hospitalized and I explained she had a urine infection that got in her blood stream and that she was very sick but getting much better. Patient seemed very relieved that she was getting better.Odell is medically ready for hospital discharge but [facility] that patient accepted to - [facility] does not have a bed until Monday, 12/7.     Review of Systems   Unable to perform ROS: Dementia     Objective:     Vital Signs (Most Recent):  Temp: 96.9 °F (36.1 °C) (12/04/20 1519)  Pulse: 86 (12/04/20 1519)  Resp: 16 (12/04/20 1519)  BP: 127/62 (12/04/20 1519)  SpO2: 99 % (12/04/20 1519) on room air Vital Signs (24h Range):  Temp:  [96.6 °F (35.9 °C)-98.2 °F (36.8 °C)] 96.9 °F (36.1 °C)  Pulse:  [69-91] 86  Resp:  [16-28] 16  SpO2:  [95 %-99 %] 99 %  BP: (107-135)/(59-83) 127/62     Weight: 75.2 kg (165 lb 12.6 oz)  Body mass index is 30.32 kg/m².    Intake/Output Summary (Last 24 hours) at 12/4/2020 1648  Last data filed at 12/4/2020 1619  Gross per 24 hour   Intake 560 ml   Output 1175 ml   Net -615 ml      Physical Exam  Vitals signs and nursing note reviewed.   Constitutional:       General: She is not in acute distress.     Appearance: Normal appearance. She is obese. She is not ill-appearing.   Eyes:      Conjunctiva/sclera: Conjunctivae normal.   Neck:      Vascular: No JVD.   Cardiovascular:      Rate and Rhythm: Normal rate and regular rhythm.      Heart sounds: Normal heart sounds. No murmur. No friction rub. No gallop.    Pulmonary:      Effort: Pulmonary effort is normal. No respiratory distress.      Breath sounds: Normal breath sounds. No wheezing.   Abdominal:      General: Abdomen is flat. Bowel sounds are normal. There is no distension.      Palpations: Abdomen is soft.      Tenderness: There is no abdominal tenderness.    Skin:     General: Skin is warm.      Findings: No erythema.   Neurological:      Mental Status: She is alert.      Comments: Oriented to person but not to place or time.    Psychiatric:         Mood and Affect: Mood normal.         Behavior: Behavior is cooperative.         Significant Labs:   CBC:   Recent Labs   Lab 12/03/20 0241 12/04/20  0552   WBC 9.28 9.32   HGB 7.5* 8.6*   HCT 25.1* 29.5*    185     CMP:   Recent Labs   Lab 12/03/20 0241 12/04/20  0552    134*   K 3.9 4.2    108   CO2 21* 20*   GLU 89 108   BUN 35* 26*   CREATININE 1.0 0.8   CALCIUM 8.1* 8.2*   PROT 5.0* 5.9*   ALBUMIN 1.6* 1.8*   BILITOT 0.6 0.7   ALKPHOS 80 87   AST 25 24   ALT 16 17   ANIONGAP 7* 6*   EGFRNONAA 51.9* >60.0     Magnesium:   Recent Labs   Lab 12/03/20 0241 12/04/20  0552   MG 1.8 1.8       Significant Imaging: I have reviewed all pertinent imaging results/findings within the past 24 hours.

## 2020-12-04 NOTE — PT/OT/SLP PROGRESS
Physical Therapy      Patient Name:  Ciara Lozano   MRN:  [id number]    Patient not seen today secondary to pt in care of nursing first attempt. Was unable to return for second attempt. Will follow up at next scheduled visit per PT POC.    Lauren Barrios PTA

## 2020-12-04 NOTE — ASSESSMENT & PLAN NOTE
· Present on admit. Encephalopathy felt related to sepsis and infection in conjunction with memory deficits. Encephalopathy improved from admit.   · Patient still have some waxing and waning mental state and at times sleeps during day and will not respond but appears more voluntary and purposeful and family confirmed.   · Continue delirium precautions.

## 2020-12-04 NOTE — PLAN OF CARE
BALDO briefly spoke with pts yoana Church-informed her that [facility] and [facility] declined and additional choices are needed. Lynnette will call baldo back to discuss further.    Edwige Conde, [facility] [phone]

## 2020-12-04 NOTE — PLAN OF CARE
GLADYS informed pt is medically ready for dc. [facility] is following but cannot admit today. The earliest a bed might be available is Monday. GLADYS updated MD.    Edwige Conde, [facility] [phone]

## 2020-12-04 NOTE — SUBJECTIVE & OBJECTIVE
Interval History: Patient's mental status continues to wax and wane and at times less responsive but appears more voluntary as patient doing on purpose than a true medical cause. Morelos remains in place and plan to continue on discharge due to urinary retention and hydronephrosis issues and Urology consult placed for outpatient referral ad follow-up. BP is stable. Patient medically stable and awaiting SNF placement.     Review of Systems   Unable to perform ROS: Dementia     Objective:     Vital Signs (Most Recent):  Temp: 97 °F (36.1 °C) (12/03/20 1957)  Pulse: 90 (12/03/20 1957)  Resp: 20 (12/03/20 1957)  BP: 134/61 (12/03/20 1957)  SpO2: 98 % (12/03/20 1957) on room air Vital Signs (24h Range):  Temp:  [97 °F (36.1 °C)-98.5 °F (36.9 °C)] 97 °F (36.1 °C)  Pulse:  [81-91] 90  Resp:  [14-28] 28  SpO2:  [98 %] 98 %  BP: (112-155)/(54-86) 134/61     Weight: 75.6 kg (166 lb 10.7 oz)  Body mass index is 30.48 kg/m².    Intake/Output Summary (Last 24 hours) at 12/3/2020 2255  Last data filed at 12/3/2020 1800  Gross per 24 hour   Intake 440 ml   Output 600 ml   Net -160 ml      Physical Exam  Vitals signs and nursing note reviewed.   Constitutional:       General: She is not in acute distress.     Appearance: Normal appearance. She is obese. She is not ill-appearing.   Eyes:      Conjunctiva/sclera: Conjunctivae normal.   Neck:      Vascular: No JVD.   Cardiovascular:      Rate and Rhythm: Normal rate and regular rhythm.      Heart sounds: Normal heart sounds. No murmur. No friction rub. No gallop.    Pulmonary:      Effort: Pulmonary effort is normal. No respiratory distress.      Breath sounds: Normal breath sounds. No wheezing.   Abdominal:      General: Abdomen is flat. Bowel sounds are normal. There is no distension.      Palpations: Abdomen is soft.      Tenderness: There is no abdominal tenderness.   Skin:     General: Skin is warm.      Capillary Refill: Capillary refill takes less than 2 seconds.      Findings:  No erythema.   Neurological:      Mental Status: She is alert. She is disoriented.   Psychiatric:         Mood and Affect: Mood normal.         Significant Labs:   CBC:   Recent Labs   Lab 12/02/20  0456 12/03/20  0241   WBC 8.46 9.28   HGB 7.8* 7.5*   HCT 26.2* 25.1*    161     CMP:   Recent Labs   Lab 12/02/20 0456 12/03/20  0241    138   K 4.2 3.9   * 110   CO2 18* 21*   GLU 79 89   BUN 42* 35*   CREATININE 1.1 1.0   CALCIUM 8.2* 8.1*   PROT 4.8* 5.0*   ALBUMIN 1.5* 1.6*   BILITOT 0.6 0.6   ALKPHOS 80 80   AST 23 25   ALT 17 16   ANIONGAP 9 7*   EGFRNONAA 46.2* 51.9*     Magnesium:   Recent Labs   Lab 12/02/20 0456 12/03/20  0241   MG 1.9 1.8     Blood Culture, Routine   Date Value Ref Range Status   11/28/2020 No growth after 5 days.  Final   11/28/2020 No growth after 5 days.  Final     Urine Culture, Routine   Date Value Ref Range Status   11/27/2020 KLEBSIELLA PNEUMONIAE  >100,000 cfu/ml   (A)  Final   11/27/2020 PROVIDENCIA STUARTII  > 100,000 cfu/ml   (A)  Final       Significant Imaging: I have reviewed all pertinent imaging results/findings within the past 24 hours.

## 2020-12-04 NOTE — ASSESSMENT & PLAN NOTE
Chronic and controlled. Lisinopril held on admit due to JOHNNY and JOHNNY now revolved so plan to restart Lisinopril on discharge. Patient has not need for diuretics at this time as euvolemic and not on diuretics at home. Patient on Metoprolol for chronic heart failure and will continue.

## 2020-12-04 NOTE — PROGRESS NOTES
"[facility] Hospital Medicine  Progress Note    Patient Name: Ciara Lozano  MRN: [id number]  Patient Class: IP- Inpatient   Admission Date: 11/27/2020  Length of Stay: 7 days  Attending Physician: Mami Gaitan MD  Primary Care Provider: Andreina Gonzales MD    [facility] Medicine Team: [facility] HOSP MED K Mami Gaitan MD    Subjective:     Principal Problem:Sepsis without acute organ dysfunction        HPI:  Ms. Ciara Lozano is a 85 y.o. female with CKD 3, chronic combined systolic and diastolic heart failure, HTN, CAD, memory issues, anemia of chronic disease, who was in her usual state of health which is unclear what that is but seems deconditioned. She cannot give much hx due to encephalopathy from sepsis/uti and likely mild uremia also from her JOHNNY on CKD3. She cant say what brought her here, doesn't answer a lot of questions. Says "you ask the same ones that the other guys did". Says its 2020, [place] [facility], and [facility]. Reports pain to her legs but no falls. Does not answer when asked about hydration or urine symptoms the last few days.  In ER retaining urine requiring straight cath and UA consistent with UTI. Johnny on admit, given small bolus in the ER.  TSH low found in the ER, CXR stable. FeNa compatible with post obstructive JOHNNY, low threshold cervantes if retains further, U/S kidneys ordered.  Trop mildly up, chronic issue with this (similar in earlier 2020), will trend.  Denies chest pain.       Overview/Hospital Course:  Patient admitted with sepsis and encephalopathy felt related to UTI. Ultrasound done of kidneys and showed mild bilateral hydronephrosis and felt to be a chronic process. Cervantes placed and patient noted to be retaining urine and Cervantes relieved hydronephrosis and urinary retention. Patient had JOHNNY also on admit with Cr 3.0 and felt related to urinary retention/sepsis and improved with Cervantes and IVF's and Cr normalized back to baseline levels in hospital. U/A " positive for infection and blood and urine cultures done and sent ot lab. Patient started on IV Rocephin to treat. Blood cultures returned positive for Proteus mirabilis and Klebsiella pneumoniae. Urine culture positive for > 100,000 organisms with Klebsiella and Providencia. Sepsis improved and resolved with antibiotics. Sensitivities reviewed and patient de-escalated to Cipro alone on 12/1 to treat bacteremia and UTI. Bacteremia related to UTI as source and UTI felt related to chronic hydronephrosis and urinary retention. Patient to be discharged with Morelos in place and outpatient Urology follow-up. PT/OT consulted in hospital and recommending SNF on discharge and referrals sent and patient accepted to [facility] but cannot take patient until Monday, 12/7 as no bed available. Encephalopathy much improved from admit but mental state does wax and wane throughout day time and family reports patient has been doing this for months and most of day she likes to sleep and also observed in hospital.    Interval History: Patient very awake and alert this am and conversant. Patient getting repositioned in bed by nursing. Patient denies any pain and was asking about what caused her to get hospitalized and I explained she had a urine infection that got in her blood stream and that she was very sick but getting much better. Patient seemed very relieved that she was getting better.[place] is medically ready for hospital discharge but SNF that patient accepted to - [facility] does not have a bed until Monday, 12/7.     Review of Systems   Unable to perform ROS: Dementia     Objective:     Vital Signs (Most Recent):  Temp: 96.9 °F (36.1 °C) (12/04/20 1519)  Pulse: 86 (12/04/20 1519)  Resp: 16 (12/04/20 1519)  BP: 127/62 (12/04/20 1519)  SpO2: 99 % (12/04/20 1519) on room air Vital Signs (24h Range):  Temp:  [96.6 °F (35.9 °C)-98.2 °F (36.8 °C)] 96.9 °F (36.1 °C)  Pulse:  [69-91] 86  Resp:  [16-28] 16  SpO2:  [95 %-99  %] 99 %  BP: (107-135)/(59-83) 127/62     Weight: 75.2 kg (165 lb 12.6 oz)  Body mass index is 30.32 kg/m².    Intake/Output Summary (Last 24 hours) at 12/4/2020 1648  Last data filed at 12/4/2020 1619  Gross per 24 hour   Intake 560 ml   Output 1175 ml   Net -615 ml      Physical Exam  Vitals signs and nursing note reviewed.   Constitutional:       General: She is not in acute distress.     Appearance: Normal appearance. She is obese. She is not ill-appearing.   Eyes:      Conjunctiva/sclera: Conjunctivae normal.   Neck:      Vascular: No JVD.   Cardiovascular:      Rate and Rhythm: Normal rate and regular rhythm.      Heart sounds: Normal heart sounds. No murmur. No friction rub. No gallop.    Pulmonary:      Effort: Pulmonary effort is normal. No respiratory distress.      Breath sounds: Normal breath sounds. No wheezing.   Abdominal:      General: Abdomen is flat. Bowel sounds are normal. There is no distension.      Palpations: Abdomen is soft.      Tenderness: There is no abdominal tenderness.   Skin:     General: Skin is warm.      Findings: No erythema.   Neurological:      Mental Status: She is alert.      Comments: Oriented to person but not to place or time.    Psychiatric:         Mood and Affect: Mood normal.         Behavior: Behavior is cooperative.         Significant Labs:   CBC:   Recent Labs   Lab 12/03/20 0241 12/04/20  0552   WBC 9.28 9.32   HGB 7.5* 8.6*   HCT 25.1* 29.5*    185     CMP:   Recent Labs   Lab 12/03/20 0241 12/04/20  0552    134*   K 3.9 4.2    108   CO2 21* 20*   GLU 89 108   BUN 35* 26*   CREATININE 1.0 0.8   CALCIUM 8.1* 8.2*   PROT 5.0* 5.9*   ALBUMIN 1.6* 1.8*   BILITOT 0.6 0.7   ALKPHOS 80 87   AST 25 24   ALT 16 17   ANIONGAP 7* 6*   EGFRNONAA 51.9* >60.0     Magnesium:   Recent Labs   Lab 12/03/20  0241 12/04/20  0552   MG 1.8 1.8       Significant Imaging: I have reviewed all pertinent imaging results/findings within the past 24  hours.      Assessment/Plan:      * Sepsis without acute organ dysfunction  Bacteremia due to Klebsiella pneumoniae and Proteus Mirabilis  Klebsiella and Providencia urinary tract infection  · Sepsis resolved. Infection under control. WBC normal.   · Present on admit. Patient with positive blood culture with both Klebsiella pneumoniae and Proteus mirabilis. Urine culture also positive for Klebsiella and Providencia. Bacteria felt related to urinary source. Patient had hydronephrosis and urinary retention on ultrasound on admit and likely reason patient developed infection subsequently.  · Repeat blood culture son 11/28 were final no growth.  · Patient treated initially with broad spectrum antibiotics but when sensitivities returned on all organisms antibiotic switched to Cipro alone as all organisms sensitive and plan is to treat for a total of 14 days from last negative blood culture on 11/28 with end date of 12/12/2020.     Septic encephalopathy  · Present on admit. Encephalopathy felt related to sepsis and infection in conjunction with memory deficits. Encephalopathy much improved from admit.   · Patient still have some waxing and waning mental state and at times sleeps during day and will not respond but appears more voluntary and purposeful and family confirmed.   · Continue delirium precautions.       Urinary retention  Other hydronephrosis  Patient with JOHNNY on admit and US showed hydronephrosis and Morelos placed and patient hydrated and JOHNNY resolved so likely obstructive uropathy related to urinary retention of unknown cause lead to JOHNNY. Plan to continue Morelos and will continue Morelos on discharge and patient to follow-up with Urology as outpatient.     Chronic combined systolic and diastolic heart failure  Chronic and controlled. Lisinopril held on admit due to JOHNNY and JOHNNY now revolved so plan to restart Lisinopril on discharge. Patient has not need for diuretics at this time as euvolemic and not on diuretics  at home. Patient on Metoprolol for chronic heart failure and will continue.       Essential hypertension  BP meds held on admit due to hypotension and JOHNNY and concern for sepsis and volume depletion. Patient restarted on low dose Metoprolol and BP stable. Plan to resume Lisinopril on discharge if BP tolerates. Patient normotensive at this time on Metoprolol alone. Target BP < 140/90.       Coronary artery disease involving native coronary artery of native heart without angina pectoris  Chronic and controlled. Patient asymptomatic.Continue Plavix and Metoprolol and Lipitor to treat.       Memory deficits  Patient with waxing and waning mental state in hospital and known memory deficits. Patient appears to be at cognitive baseline according to family,. Continue delirium precautions.       Physical deconditioning  Present on admit. PT/OT consulted and recommending SNF placement on discharge and CM/SW working with family on SNF placement for hospital discharge and patient accepted and bed will be available at [facility] for 12/7. Patient medically ready for hospital discharge. .       Acquired hypothyroidism  Chronic and controlled. Continue Levothyroxine 50 mcg po daily to treat.         VTE Risk Mitigation (From admission, onward)         Ordered     IP VTE HIGH RISK PATIENT  Once      11/27/20 1952     Place sequential compression device  Until discontinued      11/27/20 1952     Place ZORAIDA hose  Until discontinued      11/27/20 1535     Place sequential compression device  Until discontinued      11/27/20 1535                Discharge Planning   ADRYAN: 12/8/2020     Code Status: Full Code   Is the patient medically ready for discharge?: Yes    Reason for patient still in hospital (select all that apply): Pending disposition  Discharge Plan A: Skilled Nursing Facility   Discharge Delays: (!) Post-Acute Set-up(Working on SNF placement.)              Mami Gaitan MD  Department of Hospital Medicine    [facility]

## 2020-12-04 NOTE — ASSESSMENT & PLAN NOTE
Present on admit. PT/OT consulted and recommending SNF placement on discharge and CM/SW working with family on SNF placement for hospital discharge and patient accepted and bed will be available at [facility] for 12/7. Patient medically ready for hospital discharge. .

## 2020-12-04 NOTE — HPI
"Ms. Ciara Lozano is a 85 y.o. female with CKD 3, chronic combined systolic and diastolic heart failure, HTN, CAD, memory issues, anemia of chronic disease, who was in her usual state of health which is unclear what that is but seems deconditioned. She cannot give much hx due to encephalopathy from sepsis/uti and likely mild uremia also from her TERESA on CKD3. She cant say what brought her here, doesn't answer a lot of questions. Says "you ask the same ones that the other guys did". Says its 2020, [place], [facility], and a hospital. Reports pain to her legs but no falls. Does not answer when asked about hydration or urine symptoms the last few days.  In ER retaining urine requiring straight cath and UA consistent with UTI. Teresa on admit, given small bolus in the ER.  TSH low found in the ER, CXR stable. FeNa compatible with post obstructive TERESA, low threshold cervantes if retains further, U/S kidneys ordered.  Trop mildly up, chronic issue with this (similar in earlier 2020), will trend.  Denies chest pain.     "

## 2020-12-04 NOTE — PROGRESS NOTES
"[facility] Hospital Medicine  Progress Note    Patient Name: Ciara Lozano  MRN: [id number]  Patient Class: IP- Inpatient   Admission Date: 11/27/2020  Length of Stay: 6 days  Attending Physician: Mami Gaitan MD  Primary Care Provider: Andreina Gonzales MD    [facility] Medicine Team: [facility] HOSP MED K Mami Gaitan MD    Subjective:     Principal Problem:Sepsis without acute organ dysfunction        HPI:  Ms. Ciara Lozano is a 85 y.o. female with CKD 3, chronic combined systolic and diastolic heart failure, HTN, CAD, memory issues, anemia of chronic disease, who was in her usual state of health which is unclear what that is but seems deconditioned. She cannot give much hx due to encephalopathy from sepsis/uti and likely mild uremia also from her JOHNNY on CKD3. She cant say what brought her here, doesn't answer a lot of questions. Says "you ask the same ones that the other guys did". Says its 2020, [place] [facility], and [facility]. Reports pain to her legs but no falls. Does not answer when asked about hydration or urine symptoms the last few days.  In ER retaining urine requiring straight cath and UA consistent with UTI. Johnny on admit, given small bolus in the ER.  TSH low found in the ER, CXR stable. FeNa compatible with post obstructive JOHNNY, low threshold cervantes if retains further, U/S kidneys ordered.  Trop mildly up, chronic issue with this (similar in earlier 2020), will trend.  Denies chest pain.       Overview/Hospital Course:  Patient admitted with sepsis and encephalopathy felt related to UTI. Ultrasound done of kidneys and showed mild bilateral hydronephrosis and felt to be a chronic process. Cervantes placed and patient noted to be retaining urine and Cervantes relieved hydronephrosis and urinary retention. Patient had JOHNNY also on admit with Cr 3.0 and felt related to urinary retention/sepsis and improved with Cervantes and IVF's and Cr normalized back to baseline levels in hospital. U/A " positive for infection and blood and urine cultures done and sent ot lab. Patient started on IV Rocephin to treat. Blood cultures returned positive for Proteus mirabilis and Klebsiella pneumoniae. Urine culture positive for > 100,000 organisms with Klebsiella and Providencia. Sepsis improved and resolved with antibiotics. Sensitivities reviewed and patient de-escalated to Cipro alone on 12/1 to treat bacteremia and UTI. Bacteremia related to UTI as source and UTI felt related to chronic hydronephrosis and urinary retention. Patient to be discharged with Morelos in place and outpatient Urology follow-up. PT/OT consulted in hospital and recommending SNF on discharge and referrals sent and awaiting accepting facility and insurance authorization for discharge. Encephalopathy improving but mental state does wax and wane throughout day time and family reports patient has been doing this for months and most of day she likes to sleep and also observed in hospital.      Interval History: Patient's mental status continues to wax and wane and at times less responsive but appears more voluntary as patient doing on purpose than a true medical cause. Morelos remains in place and plan to continue on discharge due to urinary retention and hydronephrosis issues and Urology consult placed for outpatient referral ad follow-up. BP is stable. Patient medically stable and awaiting SNF placement.     Review of Systems   Unable to perform ROS: Dementia     Objective:     Vital Signs (Most Recent):  Temp: 97 °F (36.1 °C) (12/03/20 1957)  Pulse: 90 (12/03/20 1957)  Resp: 20 (12/03/20 1957)  BP: 134/61 (12/03/20 1957)  SpO2: 98 % (12/03/20 1957) on room air Vital Signs (24h Range):  Temp:  [97 °F (36.1 °C)-98.5 °F (36.9 °C)] 97 °F (36.1 °C)  Pulse:  [81-91] 90  Resp:  [14-28] 28  SpO2:  [98 %] 98 %  BP: (112-155)/(54-86) 134/61     Weight: 75.6 kg (166 lb 10.7 oz)  Body mass index is 30.48 kg/m².    Intake/Output Summary (Last 24 hours) at  12/3/2020 2255  Last data filed at 12/3/2020 1800  Gross per 24 hour   Intake 440 ml   Output 600 ml   Net -160 ml      Physical Exam  Vitals signs and nursing note reviewed.   Constitutional:       General: She is not in acute distress.     Appearance: Normal appearance. She is obese. She is not ill-appearing.   Eyes:      Conjunctiva/sclera: Conjunctivae normal.   Neck:      Vascular: No JVD.   Cardiovascular:      Rate and Rhythm: Normal rate and regular rhythm.      Heart sounds: Normal heart sounds. No murmur. No friction rub. No gallop.    Pulmonary:      Effort: Pulmonary effort is normal. No respiratory distress.      Breath sounds: Normal breath sounds. No wheezing.   Abdominal:      General: Abdomen is flat. Bowel sounds are normal. There is no distension.      Palpations: Abdomen is soft.      Tenderness: There is no abdominal tenderness.   Skin:     General: Skin is warm.      Capillary Refill: Capillary refill takes less than 2 seconds.      Findings: No erythema.   Neurological:      Mental Status: She is alert. She is disoriented.   Psychiatric:         Mood and Affect: Mood normal.         Significant Labs:   CBC:   Recent Labs   Lab 12/02/20 0456 12/03/20  0241   WBC 8.46 9.28   HGB 7.8* 7.5*   HCT 26.2* 25.1*    161     CMP:   Recent Labs   Lab 12/02/20 0456 12/03/20  0241    138   K 4.2 3.9   * 110   CO2 18* 21*   GLU 79 89   BUN 42* 35*   CREATININE 1.1 1.0   CALCIUM 8.2* 8.1*   PROT 4.8* 5.0*   ALBUMIN 1.5* 1.6*   BILITOT 0.6 0.6   ALKPHOS 80 80   AST 23 25   ALT 17 16   ANIONGAP 9 7*   EGFRNONAA 46.2* 51.9*     Magnesium:   Recent Labs   Lab 12/02/20 0456 12/03/20  0241   MG 1.9 1.8     Blood Culture, Routine   Date Value Ref Range Status   11/28/2020 No growth after 5 days.  Final   11/28/2020 No growth after 5 days.  Final     Urine Culture, Routine   Date Value Ref Range Status   11/27/2020 KLEBSIELLA PNEUMONIAE  >100,000 cfu/ml   (A)  Final   11/27/2020 PROVIDENCIA  STUARTII  > 100,000 cfu/ml   (A)  Final       Significant Imaging: I have reviewed all pertinent imaging results/findings within the past 24 hours.      Assessment/Plan:      * Sepsis without acute organ dysfunction  Bacteremia due to Klebsiella pneumoniae and Proteus Mirabilis  Klebsiella and Providencia urinary tract infection  · Sepsis resolved. Infection under control. WBC normal.   · Present on admit. Patient with positive blood culture with both Klebsiella pneumoniae and Proteus mirabilis. Urine culture also positive for Klebsiella and Providencia. Bacteria felt related to urinary source. Patient had hydronephrosis and urinary retention on ultrasound on admit and likely reason patient developed infection subsequently.  · Repeat blood culture son 11/28 were final no growth.  · Patient treated initially with broad spectrum antibiotics but when sensitivities returned on all organisms antibiotic switched to Cipro alone as all organisms sensitive and plan is to treat for a total of 14 days from last negative blood culture on 11/28 with end date of 12/12/2020.     Septic encephalopathy  · Present on admit. Encephalopathy felt related to sepsis and infection in conjunction with memory deficits. Encephalopathy improved from admit.   · Patient still have some waxing and waning mental state and at times sleeps during day and will not respond but appears more voluntary and purposeful and family confirmed.   · Continue delirium precautions.       Urinary retention  Other hydronephrosis  Patient with JOHNNY on admit and US showed hydronephrosis and Morelos placed and patient hydrated and JOHNNY resolved so likely obstructive uropathy related to urinary retention of unknown cause lead to JOHNNY. Plan to continue Morelos and will continue Morelos on discharge and patient to follow-up with Urology as outpatient.     Chronic combined systolic and diastolic heart failure  Chronic and controlled. Lisinopril held on admit due to JOHNNY and JOHNNY now  revolved so plan to restart Lisinopril on discharge. Patient has not need for diuretics at this time as euvolemic and not on diuretics at home. Patient on Metoprolol for chronic heart failure and will continue.       Essential hypertension  BP meds held on admit due to hypotension and JOHNNY and concern for sepsis and volume depletion. Patient restarted on low dose metoprolol and BP stable. Plan to resume Lisinopril on discharge if BP tolerates. Patient normotensive at this time on Metoprolol alone. Target BP < 140/90.       Coronary artery disease involving native coronary artery of native heart without angina pectoris  Chronic and controlled. Patient asymptomatic.Continue Plavix and Metoprolol and Lipitor to treat.       Memory deficits  Patient with waxing and waning mental state in hospital and known memory deficits. Patient appears to be at cognitive baseline according to family,. Continue delirium precautions.       Physical deconditioning  Present on admit. PT/OT consulted and recommending SNF placement on discharge and CM/SW working with family on SNF placement for hospital discharge.       Acquired hypothyroidism  Chronic and controlled. Continue Levothyroxine 50 mcg po daily to treat.         VTE Risk Mitigation (From admission, onward)         Ordered     IP VTE HIGH RISK PATIENT  Once      11/27/20 1952     Place sequential compression device  Until discontinued      11/27/20 1952     Place ZORAIDA hose  Until discontinued      11/27/20 1535     Place sequential compression device  Until discontinued      11/27/20 1535                Discharge Planning   ADRYAN: 12/4/2020     Code Status: Full Code   Is the patient medically ready for discharge?: No    Reason for patient still in hospital (select all that apply): Patient trending condition  Discharge Plan A: Skilled Nursing Facility                  Mami Gaitan MD  Department of Hospital Medicine   [facility]

## 2020-12-04 NOTE — ASSESSMENT & PLAN NOTE
· Present on admit. Encephalopathy felt related to sepsis and infection in conjunction with memory deficits. Encephalopathy much improved from admit.   · Patient still have some waxing and waning mental state and at times sleeps during day and will not respond but appears more voluntary and purposeful and family confirmed.   · Continue delirium precautions.

## 2020-12-04 NOTE — HOSPITAL COURSE
Patient admitted with sepsis and encephalopathy felt related to UTI. Ultrasound done of kidneys and showed mild bilateral hydronephrosis and felt to be a chronic process. Morelos placed and patient noted to be retaining urine and Morelos relieved hydronephrosis and urinary retention. Patient had JOHNNY also on admit with Cr 3.0 and felt related to urinary retention/sepsis and improved with Morelos and IVF's and Cr normalized back to baseline levels in hospital. U/A positive for infection and blood and urine cultures done and sent ot lab. Patient started on IV Rocephin to treat. Blood cultures returned positive for Proteus mirabilis and Klebsiella pneumoniae. Urine culture positive for > 100,000 organisms with Klebsiella and Providencia. Sepsis improved and resolved with antibiotics. Sensitivities reviewed and patient de-escalated to Cipro alone on 12/1 to treat bacteremia and UTI. Bacteremia related to UTI as source and UTI felt related to chronic hydronephrosis and urinary retention. Patient to be discharged with Morelos in place and outpatient Urology follow-up. PT/OT consulted in hospital and recommending SNF on discharge and referrals sent and [facility] so more referrals sent on 12/7. Encephalopathy much improved from admit but mental state does wax and wane throughout day time and family reports patient has been doing this for months and most of day she likes to sleep and also observed in hospital. Patient's cognition improved. Patient with slow decrease in Hgb in hospital and Hgb trended down to 6.5 on 12/9 and necessitating blood transfusion so patient to be transfused 1 unit of PRBCs on 12/9. SW/CM able to find an accepting SNF facility ([facility]) so once medically ready patient has a SNF facility to discharge to.

## 2020-12-04 NOTE — PLAN OF CARE
12/04/20 1445   Discharge Reassessment   Assessment Type Discharge Planning Reassessment   Discharge Plan A Skilled Nursing Facility   Discharge Plan B Home Health   DME Needed Upon Discharge  other (see comments)  (TBD)   Anticipated Discharge Disposition SNF   Post-Acute Status   Post-Acute Authorization Placement   Post-Acute Placement Status Referrals Sent   Discharge Delays (!) Post-Acute Set-up  (Working on SNF placement.)

## 2020-12-04 NOTE — ASSESSMENT & PLAN NOTE
Patient with waxing and waning mental state in hospital and known memory deficits. Patient appears to be at cognitive baseline according to family,. Continue delirium precautions.

## 2020-12-04 NOTE — ASSESSMENT & PLAN NOTE
Chronic and controlled. Patient asymptomatic.Continue Plavix and Metoprolol and Lipitor to treat.

## 2020-12-04 NOTE — PLAN OF CARE
Problem: Adult Inpatient Plan of Care  Goal: Plan of Care Review  Outcome: Ongoing, Progressing  Goal: Optimal Comfort and Wellbeing  Outcome: Ongoing, Progressing     Problem: Infection  Goal: Infection Symptom Resolution  Outcome: Ongoing, Progressing  Intervention: Prevent or Manage Infection  Flowsheets (Taken 12/4/2020 3220)  Fever Reduction/Comfort Measures:   lightweight bedding   lightweight clothing     Problem: Nutrition Impaired (Sepsis/Septic Shock)  Goal: Optimal Nutrition Intake  Outcome: Ongoing, Progressing     Problem: Skin Injury Risk Increased  Goal: Skin Health and Integrity  Outcome: Ongoing, Progressing     Problem: Fall Injury Risk  Goal: Absence of Fall and Fall-Related Injury  Outcome: Ongoing, Progressing     POC review with patient. Address questions and concerns. AAOX4. VVS. Neuro checks q4. Delay response to questions. Jethro patent to  bG. Reposition with pillow/wedge. Encourage eat during meal x's. No acute changes. Safety maintain. Call light in reach. [facility]

## 2020-12-05 PROBLEM — D63.8 ANEMIA OF CHRONIC DISEASE: Status: ACTIVE | Noted: 2020-11-27

## 2020-12-05 LAB
ALBUMIN SERPL BCP-MCNC: 1.7 G/DL (ref 3.5–5.2)
ALP SERPL-CCNC: 78 U/L (ref 55–135)
ALT SERPL W/O P-5'-P-CCNC: 14 U/L (ref 10–44)
ANION GAP SERPL CALC-SCNC: 6 MMOL/L (ref 8–16)
AST SERPL-CCNC: 24 U/L (ref 10–40)
BASOPHILS # BLD AUTO: 0.03 K/UL (ref 0–0.2)
BASOPHILS NFR BLD: 0.3 % (ref 0–1.9)
BILIRUB SERPL-MCNC: 0.7 MG/DL (ref 0.1–1)
BUN SERPL-MCNC: 21 MG/DL (ref 8–23)
CALCIUM SERPL-MCNC: 7.9 MG/DL (ref 8.7–10.5)
CHLORIDE SERPL-SCNC: 108 MMOL/L (ref 95–110)
CO2 SERPL-SCNC: 21 MMOL/L (ref 23–29)
CREAT SERPL-MCNC: 0.8 MG/DL (ref 0.5–1.4)
DIFFERENTIAL METHOD: ABNORMAL
EOSINOPHIL # BLD AUTO: 0.1 K/UL (ref 0–0.5)
EOSINOPHIL NFR BLD: 1 % (ref 0–8)
ERYTHROCYTE [DISTWIDTH] IN BLOOD BY AUTOMATED COUNT: 20.8 % (ref 11.5–14.5)
EST. GFR  (AFRICAN AMERICAN): >60 ML/MIN/1.73 M^2
EST. GFR  (NON AFRICAN AMERICAN): >60 ML/MIN/1.73 M^2
GLUCOSE SERPL-MCNC: 97 MG/DL (ref 70–110)
HCT VFR BLD AUTO: 24.9 % (ref 37–48.5)
HGB BLD-MCNC: 7.4 G/DL (ref 12–16)
IMM GRANULOCYTES # BLD AUTO: 0.38 K/UL (ref 0–0.04)
IMM GRANULOCYTES NFR BLD AUTO: 4.3 % (ref 0–0.5)
LYMPHOCYTES # BLD AUTO: 1.5 K/UL (ref 1–4.8)
LYMPHOCYTES NFR BLD: 17.2 % (ref 18–48)
MCH RBC QN AUTO: 25.7 PG (ref 27–31)
MCHC RBC AUTO-ENTMCNC: 29.7 G/DL (ref 32–36)
MCV RBC AUTO: 87 FL (ref 82–98)
MONOCYTES # BLD AUTO: 0.5 K/UL (ref 0.3–1)
MONOCYTES NFR BLD: 5.6 % (ref 4–15)
NEUTROPHILS # BLD AUTO: 6.3 K/UL (ref 1.8–7.7)
NEUTROPHILS NFR BLD: 71.6 % (ref 38–73)
NRBC BLD-RTO: 0 /100 WBC
PLATELET # BLD AUTO: 166 K/UL (ref 150–350)
PMV BLD AUTO: 11.8 FL (ref 9.2–12.9)
POTASSIUM SERPL-SCNC: 4 MMOL/L (ref 3.5–5.1)
PROT SERPL-MCNC: 5.3 G/DL (ref 6–8.4)
RBC # BLD AUTO: 2.88 M/UL (ref 4–5.4)
SODIUM SERPL-SCNC: 135 MMOL/L (ref 136–145)
TROPONIN I SERPL DL<=0.01 NG/ML-MCNC: 0.07 NG/ML (ref 0–0.03)
WBC # BLD AUTO: 8.77 K/UL (ref 3.9–12.7)

## 2020-12-05 PROCEDURE — 93010 EKG 12-LEAD: ICD-10-PCS | Mod: ,,, | Performed by: INTERNAL MEDICINE

## 2020-12-05 PROCEDURE — 25000003 PHARM REV CODE 250: Performed by: HOSPITALIST

## 2020-12-05 PROCEDURE — 93010 ELECTROCARDIOGRAM REPORT: CPT | Mod: ,,, | Performed by: INTERNAL MEDICINE

## 2020-12-05 PROCEDURE — 20600001 HC STEP DOWN PRIVATE ROOM

## 2020-12-05 PROCEDURE — 25000242 PHARM REV CODE 250 ALT 637 W/ HCPCS: Performed by: HOSPITALIST

## 2020-12-05 PROCEDURE — 80053 COMPREHEN METABOLIC PANEL: CPT

## 2020-12-05 PROCEDURE — 36415 COLL VENOUS BLD VENIPUNCTURE: CPT

## 2020-12-05 PROCEDURE — 99232 PR SUBSEQUENT HOSPITAL CARE,LEVL II: ICD-10-PCS | Mod: ,,, | Performed by: INTERNAL MEDICINE

## 2020-12-05 PROCEDURE — 99232 SBSQ HOSP IP/OBS MODERATE 35: CPT | Mod: ,,, | Performed by: INTERNAL MEDICINE

## 2020-12-05 PROCEDURE — 93005 ELECTROCARDIOGRAM TRACING: CPT

## 2020-12-05 PROCEDURE — 85025 COMPLETE CBC W/AUTO DIFF WBC: CPT

## 2020-12-05 PROCEDURE — 84484 ASSAY OF TROPONIN QUANT: CPT

## 2020-12-05 RX ADMIN — SODIUM BICARBONATE 650 MG TABLET 650 MG: at 03:12

## 2020-12-05 RX ADMIN — SODIUM BICARBONATE 650 MG TABLET 650 MG: at 09:12

## 2020-12-05 RX ADMIN — METOPROLOL TARTRATE 12.5 MG: 25 TABLET, FILM COATED ORAL at 09:12

## 2020-12-05 RX ADMIN — CLOPIDOGREL 75 MG: 75 TABLET, FILM COATED ORAL at 09:12

## 2020-12-05 RX ADMIN — ATORVASTATIN CALCIUM 80 MG: 20 TABLET, FILM COATED ORAL at 09:12

## 2020-12-05 RX ADMIN — NITROGLYCERIN 0.4 MG: 0.4 TABLET SUBLINGUAL at 09:12

## 2020-12-05 RX ADMIN — DOCUSATE SODIUM 50MG AND SENNOSIDES 8.6MG 1 TABLET: 8.6; 5 TABLET, FILM COATED ORAL at 09:12

## 2020-12-05 RX ADMIN — FERROUS SULFATE TAB EC 325 MG (65 MG FE EQUIVALENT) 325 MG: 325 (65 FE) TABLET DELAYED RESPONSE at 09:12

## 2020-12-05 RX ADMIN — CIPROFLOXACIN 500 MG: 500 TABLET, FILM COATED ORAL at 09:12

## 2020-12-05 RX ADMIN — LEVOTHYROXINE SODIUM 50 MCG: 50 TABLET ORAL at 06:12

## 2020-12-05 RX ADMIN — ACETAMINOPHEN 650 MG: 325 TABLET ORAL at 09:12

## 2020-12-05 NOTE — PLAN OF CARE
Problem: Adult Inpatient Plan of Care  Goal: Plan of Care Review  Outcome: Ongoing, Progressing  Goal: Optimal Comfort and Wellbeing  Outcome: Ongoing, Progressing     Problem: Skin Injury Risk Increased  Goal: Skin Health and Integrity  Outcome: Ongoing, Progressing  Intervention: Optimize Skin Protection  Flowsheets (Taken 12/5/2020 1636)  Pressure Reduction Techniques:   frequent weight shift encouraged   weight shift assistance provided  Pressure Reduction Devices:   positioning supports utilized   foam padding utilized  Skin Protection:   incontinence pads utilized   protective footwear used   tubing/devices free from skin contact     Problem: Fall Injury Risk  Goal: Absence of Fall and Fall-Related Injury  Outcome: Ongoing, Progressing    POC review with patient. AAOX4. VVS. Remain po Abx. Encourage fluids. Morelos patent to  bag. Safety maintain. No acute changes. Call light in reach. [facility]

## 2020-12-05 NOTE — NURSING
POC discussed with patient. AAOx4 No acute changes overnight. Bed in lowest position. Call light within reach. TM

## 2020-12-05 NOTE — SUBJECTIVE & OBJECTIVE
Interval History: Patient back to cognitive baseline. Patient complaining of right wrist pain this am but on exam there was no pain on movement of wrist and wrist not swollen or inflamed. Patient medically ready and awaiting SNF placement. Patient accepted at [facility] and awaiting bed availability and hopeful discharge on Monday, 12/7.     Review of Systems   Unable to perform ROS: Dementia     Objective:     Vital Signs (Most Recent):  Temp: 96 °F (35.6 °C) (12/05/20 1528)  Pulse: 83 (12/05/20 1528)  Resp: 18 (12/05/20 1528)  BP: 130/58 (12/05/20 1528)  SpO2: 100 % (12/05/20 1528) on room air Vital Signs (24h Range):  Temp:  [96 °F (35.6 °C)-98 °F (36.7 °C)] 96 °F (35.6 °C)  Pulse:  [72-98] 83  Resp:  [17-21] 18  SpO2:  [96 %-100 %] 100 %  BP: (112-141)/(56-65) 130/58     Weight: 75.2 kg (165 lb 12.6 oz)  Body mass index is 30.32 kg/m².    Intake/Output Summary (Last 24 hours) at 12/5/2020 1635  Last data filed at 12/5/2020 1531  Gross per 24 hour   Intake 580 ml   Output 750 ml   Net -170 ml      Physical Exam  Vitals signs and nursing note reviewed.   Constitutional:       General: She is not in acute distress.     Appearance: Normal appearance. She is obese. She is not ill-appearing.   Eyes:      Conjunctiva/sclera: Conjunctivae normal.   Neck:      Vascular: No JVD.   Cardiovascular:      Rate and Rhythm: Normal rate and regular rhythm.      Heart sounds: Normal heart sounds. No murmur. No friction rub. No gallop.    Pulmonary:      Effort: Pulmonary effort is normal. No respiratory distress.      Breath sounds: Normal breath sounds. No wheezing.   Abdominal:      General: Abdomen is flat. Bowel sounds are normal. There is no distension.      Palpations: Abdomen is soft.      Tenderness: There is no abdominal tenderness.   Skin:     General: Skin is warm.      Findings: No erythema.   Neurological:      Mental Status: She is alert.      Comments: Oriented to person but not to place or time.     Psychiatric:         Mood and Affect: Mood normal.         Behavior: Behavior is cooperative.         Significant Labs:   CBC:   Recent Labs   Lab 12/04/20  0552 12/05/20  0531   WBC 9.32 8.77   HGB 8.6* 7.4*   HCT 29.5* 24.9*    166     CMP:   Recent Labs   Lab 12/04/20  0552 12/05/20  0531   * 135*   K 4.2 4.0    108   CO2 20* 21*    97   BUN 26* 21   CREATININE 0.8 0.8   CALCIUM 8.2* 7.9*   PROT 5.9* 5.3*   ALBUMIN 1.8* 1.7*   BILITOT 0.7 0.7   ALKPHOS 87 78   AST 24 24   ALT 17 14   ANIONGAP 6* 6*   EGFRNONAA >60.0 >60.0       Significant Imaging: I have reviewed all pertinent imaging results/findings within the past 24 hours.

## 2020-12-05 NOTE — ASSESSMENT & PLAN NOTE
Present on admission. Patient with known chronic anemia related to anemia of chronic disease. Hgb 9.9 on admit on 11/27. Hgb since admit has been fluctuating between 7.4-8.5 after she was hydrated so initial Hgb likely was hemoconcentrated. Continue to monitor CBC in hospital and consider transfusion if Hgb < 7.

## 2020-12-05 NOTE — PROGRESS NOTES
"[facility] Hospital Medicine  Progress Note    Patient Name: Ciara Lozano  MRN: [id number]  Patient Class: IP- Inpatient   Admission Date: 11/27/2020  Length of Stay: 8 days  Attending Physician: Mami Gaitan MD  Primary Care Provider: Andreina Gonzales MD    [facility] Medicine Team: [facility] MED K Mami Gaitan MD    Subjective:     Principal Problem:Sepsis without acute organ dysfunction        HPI:  Ms. Ciara Lozano is a 85 y.o. female with CKD 3, chronic combined systolic and diastolic heart failure, HTN, CAD, memory issues, anemia of chronic disease, who was in her usual state of health which is unclear what that is but seems deconditioned. She cannot give much hx due to encephalopathy from sepsis/uti and likely mild uremia also from her JOHNNY on CKD3. She cant say what brought her here, doesn't answer a lot of questions. Says "you ask the same ones that the other guys did". Says its 2020, [facility], and [facility]. Reports pain to her legs but no falls. Does not answer when asked about hydration or urine symptoms the last few days.  In ER retaining urine requiring straight cath and UA consistent with UTI. Johnny on admit, given small bolus in the ER.  TSH low found in the ER, CXR stable. FeNa compatible with post obstructive JOHNNY, low threshold cervantes if retains further, U/S kidneys ordered.  Trop mildly up, chronic issue with this (similar in earlier 2020), will trend.  Denies chest pain.       Overview/Hospital Course:  Patient admitted with sepsis and encephalopathy felt related to UTI. Ultrasound done of kidneys and showed mild bilateral hydronephrosis and felt to be a chronic process. Cervantes placed and patient noted to be retaining urine and Cervantes relieved hydronephrosis and urinary retention. Patient had JOHNNY also on admit with Cr 3.0 and felt related to urinary retention/sepsis and improved with Cervantes and IVF's and Cr normalized back to baseline levels in hospital. U/A " positive for infection and blood and urine cultures done and sent ot lab. Patient started on IV Rocephin to treat. Blood cultures returned positive for Proteus mirabilis and Klebsiella pneumoniae. Urine culture positive for > 100,000 organisms with Klebsiella and Providencia. Sepsis improved and resolved with antibiotics. Sensitivities reviewed and patient de-escalated to Cipro alone on 12/1 to treat bacteremia and UTI. Bacteremia related to UTI as source and UTI felt related to chronic hydronephrosis and urinary retention. Patient to be discharged with Morelos in place and outpatient Urology follow-up. PT/OT consulted in hospital and recommending SNF on discharge and referrals sent and patient accepted to [facility] but cannot take patient until Monday, 12/7 as no bed available. Encephalopathy much improved from admit but mental state does wax and wane throughout day time and family reports patient has been doing this for months and most of day she likes to sleep and also observed in hospital.    Interval History: Patient back to cognitive baseline. Patient complaining of right wrist pain this am but on exam there was no pain on movement of wrist and wrist not swollen or inflamed. Patient medically ready and awaiting SNF placement. Patient accepted at [facility] and awaiting bed availability and hopeful discharge on Monday, 12/7.     Review of Systems   Unable to perform ROS: Dementia     Objective:     Vital Signs (Most Recent):  Temp: 96 °F (35.6 °C) (12/05/20 1528)  Pulse: 83 (12/05/20 1528)  Resp: 18 (12/05/20 1528)  BP: 130/58 (12/05/20 1528)  SpO2: 100 % (12/05/20 1528) on room air Vital Signs (24h Range):  Temp:  [96 °F (35.6 °C)-98 °F (36.7 °C)] 96 °F (35.6 °C)  Pulse:  [72-98] 83  Resp:  [17-21] 18  SpO2:  [96 %-100 %] 100 %  BP: (112-141)/(56-65) 130/58     Weight: 75.2 kg (165 lb 12.6 oz)  Body mass index is 30.32 kg/m².    Intake/Output Summary (Last 24 hours) at 12/5/2020 2625  Last data  filed at 12/5/2020 1531  Gross per 24 hour   Intake 580 ml   Output 750 ml   Net -170 ml      Physical Exam  Vitals signs and nursing note reviewed.   Constitutional:       General: She is not in acute distress.     Appearance: Normal appearance. She is obese. She is not ill-appearing.   Eyes:      Conjunctiva/sclera: Conjunctivae normal.   Neck:      Vascular: No JVD.   Cardiovascular:      Rate and Rhythm: Normal rate and regular rhythm.      Heart sounds: Normal heart sounds. No murmur. No friction rub. No gallop.    Pulmonary:      Effort: Pulmonary effort is normal. No respiratory distress.      Breath sounds: Normal breath sounds. No wheezing.   Abdominal:      General: Abdomen is flat. Bowel sounds are normal. There is no distension.      Palpations: Abdomen is soft.      Tenderness: There is no abdominal tenderness.   Skin:     General: Skin is warm.      Findings: No erythema.   Neurological:      Mental Status: She is alert.      Comments: Oriented to person but not to place or time.    Psychiatric:         Mood and Affect: Mood normal.         Behavior: Behavior is cooperative.         Significant Labs:   CBC:   Recent Labs   Lab 12/04/20  0552 12/05/20  0531   WBC 9.32 8.77   HGB 8.6* 7.4*   HCT 29.5* 24.9*    166     CMP:   Recent Labs   Lab 12/04/20  0552 12/05/20  0531   * 135*   K 4.2 4.0    108   CO2 20* 21*    97   BUN 26* 21   CREATININE 0.8 0.8   CALCIUM 8.2* 7.9*   PROT 5.9* 5.3*   ALBUMIN 1.8* 1.7*   BILITOT 0.7 0.7   ALKPHOS 87 78   AST 24 24   ALT 17 14   ANIONGAP 6* 6*   EGFRNONAA >60.0 >60.0       Significant Imaging: I have reviewed all pertinent imaging results/findings within the past 24 hours.      Assessment/Plan:      * Sepsis without acute organ dysfunction  Bacteremia due to Klebsiella pneumoniae and Proteus Mirabilis  Klebsiella and Providencia urinary tract infection  · Sepsis resolved. Infection under control. WBC normal.   · Present on admit. Patient  with positive blood culture with both Klebsiella pneumoniae and Proteus mirabilis. Urine culture also positive for Klebsiella and Providencia. Bacteria felt related to urinary source. Patient had hydronephrosis and urinary retention on ultrasound on admit and likely reason patient developed infection subsequently.  · Repeat blood culture son 11/28 were final no growth.  · Patient treated initially with broad spectrum antibiotics but when sensitivities returned on all organisms antibiotic switched to Cipro alone as all organisms sensitive and plan is to treat for a total of 14 days from last negative blood culture on 11/28 with end date of 12/12/2020.     Septic encephalopathy  · Present on admit. Encephalopathy felt related to sepsis and infection in conjunction with memory deficits. Encephalopathy much improved from admit.   · Patient still have some waxing and waning mental state and at times sleeps during day and will not respond but appears more voluntary and purposeful and family confirmed.   · Continue delirium precautions.       Urinary retention  Other hydronephrosis  Patient with JOHNNY on admit and US showed hydronephrosis and Morelos placed and patient hydrated and JOHNNY resolved so likely obstructive uropathy related to urinary retention of unknown cause lead to JOHNNY. Plan to continue Morelos and will continue Morelos on discharge and patient to follow-up with Urology as outpatient.     Chronic combined systolic and diastolic heart failure  Chronic and controlled. Lisinopril held on admit due to JOHNNY and JOHNNY now revolved so plan to restart Lisinopril on discharge. Patient has not need for diuretics at this time as euvolemic and not on diuretics at home. Patient on Metoprolol for chronic heart failure and will continue.       Anemia of chronic disease  Present on admission. Patient with known chronic anemia related to anemia of chronic disease. Hgb 9.9 on admit on 11/27. Hgb since admit has been fluctuating between  7.4-8.5 after she was hydrated so initial Hgb likely was hemoconcentrated. Continue to monitor CBC in hospital and consider transfusion if Hgb < 7.         Essential hypertension  BP meds held on admit due to hypotension and JOHNNY and concern for sepsis and volume depletion. Patient restarted on low dose Metoprolol and BP stable. Plan to resume Lisinopril on discharge if BP tolerates. Patient normotensive at this time on Metoprolol alone. Target BP < 140/90.       Coronary artery disease involving native coronary artery of native heart without angina pectoris  Chronic and controlled. Patient asymptomatic.Continue Plavix and Metoprolol and Lipitor to treat.       Memory deficits  Patient with waxing and waning mental state in hospital and known memory deficits. Patient appears to be at cognitive baseline according to family,. Continue delirium precautions.       Physical deconditioning  Present on admit. PT/OT consulted and recommending SNF placement on discharge and CM/SW working with family on SNF placement for hospital discharge and patient accepted and bed will be available at [facility] for 12/7. Patient medically ready for hospital discharge. .       Acquired hypothyroidism  Chronic and controlled. Continue Levothyroxine 50 mcg po daily to treat.         VTE Risk Mitigation (From admission, onward)         Ordered     IP VTE HIGH RISK PATIENT  Once      11/27/20 1952     Place sequential compression device  Until discontinued      11/27/20 1952     Place ZORAIDA hose  Until discontinued      11/27/20 1535     Place sequential compression device  Until discontinued      11/27/20 1535                Discharge Planning   ADRYAN: 12/7/2020     Code Status: Full Code   Is the patient medically ready for discharge?: Yes    Reason for patient still in hospital (select all that apply): Pending disposition  Discharge Plan A: Skilled Nursing Facility   Discharge Delays: (!) Post-Acute Set-up(Working on SNF  placement.)        Mami Gaitan MD  Department of Hospital Medicine   [facility]

## 2020-12-06 PROBLEM — G93.41 SEPTIC ENCEPHALOPATHY: Status: RESOLVED | Noted: 2020-11-27 | Resolved: 2020-12-06

## 2020-12-06 LAB
ALBUMIN SERPL BCP-MCNC: 1.7 G/DL (ref 3.5–5.2)
ALP SERPL-CCNC: 77 U/L (ref 55–135)
ALT SERPL W/O P-5'-P-CCNC: 16 U/L (ref 10–44)
ANION GAP SERPL CALC-SCNC: 4 MMOL/L (ref 8–16)
AST SERPL-CCNC: 22 U/L (ref 10–40)
BASOPHILS # BLD AUTO: 0.02 K/UL (ref 0–0.2)
BASOPHILS NFR BLD: 0.3 % (ref 0–1.9)
BILIRUB SERPL-MCNC: 0.7 MG/DL (ref 0.1–1)
BUN SERPL-MCNC: 21 MG/DL (ref 8–23)
CALCIUM SERPL-MCNC: 8.1 MG/DL (ref 8.7–10.5)
CHLORIDE SERPL-SCNC: 110 MMOL/L (ref 95–110)
CO2 SERPL-SCNC: 23 MMOL/L (ref 23–29)
CREAT SERPL-MCNC: 0.7 MG/DL (ref 0.5–1.4)
DIFFERENTIAL METHOD: ABNORMAL
EOSINOPHIL # BLD AUTO: 0.1 K/UL (ref 0–0.5)
EOSINOPHIL NFR BLD: 0.9 % (ref 0–8)
ERYTHROCYTE [DISTWIDTH] IN BLOOD BY AUTOMATED COUNT: 21.1 % (ref 11.5–14.5)
EST. GFR  (AFRICAN AMERICAN): >60 ML/MIN/1.73 M^2
EST. GFR  (NON AFRICAN AMERICAN): >60 ML/MIN/1.73 M^2
GLUCOSE SERPL-MCNC: 87 MG/DL (ref 70–110)
HCT VFR BLD AUTO: 24 % (ref 37–48.5)
HGB BLD-MCNC: 7.1 G/DL (ref 12–16)
IMM GRANULOCYTES # BLD AUTO: 0.32 K/UL (ref 0–0.04)
IMM GRANULOCYTES NFR BLD AUTO: 4.3 % (ref 0–0.5)
LYMPHOCYTES # BLD AUTO: 1.4 K/UL (ref 1–4.8)
LYMPHOCYTES NFR BLD: 19 % (ref 18–48)
MCH RBC QN AUTO: 25.1 PG (ref 27–31)
MCHC RBC AUTO-ENTMCNC: 29.6 G/DL (ref 32–36)
MCV RBC AUTO: 85 FL (ref 82–98)
MONOCYTES # BLD AUTO: 0.5 K/UL (ref 0.3–1)
MONOCYTES NFR BLD: 6.2 % (ref 4–15)
NEUTROPHILS # BLD AUTO: 5.1 K/UL (ref 1.8–7.7)
NEUTROPHILS NFR BLD: 69.3 % (ref 38–73)
NRBC BLD-RTO: 0 /100 WBC
PLATELET # BLD AUTO: 155 K/UL (ref 150–350)
PMV BLD AUTO: 10.7 FL (ref 9.2–12.9)
POTASSIUM SERPL-SCNC: 4.1 MMOL/L (ref 3.5–5.1)
PROT SERPL-MCNC: 5.3 G/DL (ref 6–8.4)
RBC # BLD AUTO: 2.83 M/UL (ref 4–5.4)
SODIUM SERPL-SCNC: 137 MMOL/L (ref 136–145)
TROPONIN I SERPL DL<=0.01 NG/ML-MCNC: 0.07 NG/ML (ref 0–0.03)
WBC # BLD AUTO: 7.42 K/UL (ref 3.9–12.7)

## 2020-12-06 PROCEDURE — 99232 SBSQ HOSP IP/OBS MODERATE 35: CPT | Mod: ,,, | Performed by: INTERNAL MEDICINE

## 2020-12-06 PROCEDURE — 80053 COMPREHEN METABOLIC PANEL: CPT

## 2020-12-06 PROCEDURE — 85025 COMPLETE CBC W/AUTO DIFF WBC: CPT

## 2020-12-06 PROCEDURE — 84484 ASSAY OF TROPONIN QUANT: CPT

## 2020-12-06 PROCEDURE — 99232 PR SUBSEQUENT HOSPITAL CARE,LEVL II: ICD-10-PCS | Mod: ,,, | Performed by: INTERNAL MEDICINE

## 2020-12-06 PROCEDURE — 20600001 HC STEP DOWN PRIVATE ROOM

## 2020-12-06 PROCEDURE — 36415 COLL VENOUS BLD VENIPUNCTURE: CPT

## 2020-12-06 PROCEDURE — 25000003 PHARM REV CODE 250: Performed by: HOSPITALIST

## 2020-12-06 RX ADMIN — SODIUM BICARBONATE 650 MG TABLET 650 MG: at 04:12

## 2020-12-06 RX ADMIN — CIPROFLOXACIN 500 MG: 500 TABLET, FILM COATED ORAL at 08:12

## 2020-12-06 RX ADMIN — METOPROLOL TARTRATE 12.5 MG: 25 TABLET, FILM COATED ORAL at 08:12

## 2020-12-06 RX ADMIN — LEVOTHYROXINE SODIUM 50 MCG: 50 TABLET ORAL at 08:12

## 2020-12-06 RX ADMIN — ATORVASTATIN CALCIUM 80 MG: 20 TABLET, FILM COATED ORAL at 09:12

## 2020-12-06 RX ADMIN — SODIUM BICARBONATE 650 MG TABLET 650 MG: at 09:12

## 2020-12-06 RX ADMIN — METOPROLOL TARTRATE 12.5 MG: 25 TABLET, FILM COATED ORAL at 09:12

## 2020-12-06 RX ADMIN — FERROUS SULFATE TAB EC 325 MG (65 MG FE EQUIVALENT) 325 MG: 325 (65 FE) TABLET DELAYED RESPONSE at 08:12

## 2020-12-06 RX ADMIN — CIPROFLOXACIN 500 MG: 500 TABLET, FILM COATED ORAL at 09:12

## 2020-12-06 RX ADMIN — DOCUSATE SODIUM 50MG AND SENNOSIDES 8.6MG 1 TABLET: 8.6; 5 TABLET, FILM COATED ORAL at 08:12

## 2020-12-06 RX ADMIN — DOCUSATE SODIUM 50MG AND SENNOSIDES 8.6MG 1 TABLET: 8.6; 5 TABLET, FILM COATED ORAL at 09:12

## 2020-12-06 RX ADMIN — SODIUM BICARBONATE 650 MG TABLET 650 MG: at 08:12

## 2020-12-06 RX ADMIN — CLOPIDOGREL 75 MG: 75 TABLET, FILM COATED ORAL at 08:12

## 2020-12-06 NOTE — PLAN OF CARE
Problem: Adult Inpatient Plan of Care  Goal: Plan of Care Review  Outcome: Ongoing, Progressing  Goal: Optimal Comfort and Wellbeing  Outcome: Ongoing, Progressing     Problem: Skin Injury Risk Increased  Goal: Skin Health and Integrity  Outcome: Ongoing, Progressing  Intervention: Optimize Skin Protection  Flowsheets (Taken 12/6/2020 8134)  Pressure Reduction Techniques:   frequent weight shift encouraged   weight shift assistance provided  Pressure Reduction Devices:   foam padding utilized   positioning supports utilized  Skin Protection:   adhesive use limited   incontinence pads utilized   tubing/devices free from skin contact  Head of Bed (HOB): HOB elevated     Problem: Infection  Goal: Infection Symptom Resolution  Outcome: Ongoing, Progressing     Problem: Fall Injury Risk  Goal: Absence of Fall and Fall-Related Injury  Outcome: Ongoing, Progressing     POC review with patient. AAOX4. VVS. No acute changes. Patient does not want to be bothered and does not want answer questions at times. Encourage repositioning. Safety maintain. Call light in reach. [facility]

## 2020-12-06 NOTE — ASSESSMENT & PLAN NOTE
Bacteremia due to Klebsiella pneumoniae and Proteus Mirabilis  Klebsiella and Providencia urinary tract infection  · Sepsis resolved. Infection under control. WBC normal.   · Present on admit. Patient with positive blood culture with both Klebsiella pneumoniae and Proteus mirabilis. Urine culture also positive for Klebsiella and Providencia. Bacteria felt related to urinary source. Patient had hydronephrosis and urinary retention on ultrasound on admit and likely reason patient developed infection subsequently.  · Repeat blood cultures on 11/28 were final no growth.  · Patient treated initially with broad spectrum antibiotics but when sensitivities returned on all organisms antibiotic switched to Cipro alone as all organisms sensitive and plan is to treat for a total of 14 days from last negative blood culture on 11/28 with end date of 12/12/2020.

## 2020-12-06 NOTE — PROGRESS NOTES
"[facility] Hospital Medicine  Progress Note    Patient Name: Ciara Lozano  MRN: [id number]  Patient Class: IP- Inpatient   Admission Date: 11/27/2020  Length of Stay: 9 days  Attending Physician: Mami Gaitan MD  Primary Care Provider: Andreina Gonzales MD    [facility] Medicine Team: [facility] HOSP MED K Mami Gaitan MD    Subjective:     Principal Problem:Sepsis without acute organ dysfunction        HPI:  Ms. Ciara Lozano is a 85 y.o. female with CKD 3, chronic combined systolic and diastolic heart failure, HTN, CAD, memory issues, anemia of chronic disease, who was in her usual state of health which is unclear what that is but seems deconditioned. She cannot give much hx due to encephalopathy from sepsis/uti and likely mild uremia also from her JOHNNY on CKD3. She cant say what brought her here, doesn't answer a lot of questions. Says "you ask the same ones that the other guys did". Says its 2020, [facility], and [facility]. Reports pain to her legs but no falls. Does not answer when asked about hydration or urine symptoms the last few days.  In ER retaining urine requiring straight cath and UA consistent with UTI. Johnny on admit, given small bolus in the ER.  TSH low found in the ER, CXR stable. FeNa compatible with post obstructive JOHNNY, low threshold cervantes if retains further, U/S kidneys ordered.  Trop mildly up, chronic issue with this (similar in earlier 2020), will trend.  Denies chest pain.       Overview/Hospital Course:  Patient admitted with sepsis and encephalopathy felt related to UTI. Ultrasound done of kidneys and showed mild bilateral hydronephrosis and felt to be a chronic process. Cervantes placed and patient noted to be retaining urine and Cervantes relieved hydronephrosis and urinary retention. Patient had JOHNNY also on admit with Cr 3.0 and felt related to urinary retention/sepsis and improved with Cervantes and IVF's and Cr normalized back to baseline levels in hospital. U/A " positive for infection and blood and urine cultures done and sent ot lab. Patient started on IV Rocephin to treat. Blood cultures returned positive for Proteus mirabilis and Klebsiella pneumoniae. Urine culture positive for > 100,000 organisms with Klebsiella and Providencia. Sepsis improved and resolved with antibiotics. Sensitivities reviewed and patient de-escalated to Cipro alone on 12/1 to treat bacteremia and UTI. Bacteremia related to UTI as source and UTI felt related to chronic hydronephrosis and urinary retention. Patient to be discharged with Morelos in place and outpatient Urology follow-up. PT/OT consulted in hospital and recommending SNF on discharge and referrals sent and patient accepted to [facility] SNF but cannot take patient until Monday, 12/7 as no bed available. Encephalopathy much improved from admit but mental state does wax and wane throughout day time and family reports patient has been doing this for months and most of day she likes to sleep and also observed in hospital.    Interval History: Patient awake and responsive. Patient with no complaints. Patient is awaiting NH SNF placement. Patient with no new issues overnight and remains medically stable.     Review of Systems   Unable to perform ROS: Dementia     Objective:     Vital Signs (Most Recent):  Temp: 96.4 °F (35.8 °C) (12/06/20 1540)  Pulse: 87 (12/06/20 1540)  Resp: 20 (12/06/20 1540)  BP: 149/65 (12/06/20 1540)  SpO2: 95 % (12/06/20 1540) on room air Vital Signs (24h Range):  Temp:  [96.4 °F (35.8 °C)-98.2 °F (36.8 °C)] 96.4 °F (35.8 °C)  Pulse:  [] 87  Resp:  [17-30] 20  SpO2:  [95 %-100 %] 95 %  BP: (110-168)/(58-88) 149/65     Weight: 75.2 kg (165 lb 12.6 oz)  Body mass index is 30.32 kg/m².    Intake/Output Summary (Last 24 hours) at 12/6/2020 1658  Last data filed at 12/6/2020 1500  Gross per 24 hour   Intake 540 ml   Output 600 ml   Net -60 ml      Physical Exam  Vitals signs and nursing note reviewed.    Constitutional:       General: She is not in acute distress.     Appearance: Normal appearance. She is obese. She is not ill-appearing.   Eyes:      Conjunctiva/sclera: Conjunctivae normal.   Neck:      Vascular: No JVD.   Cardiovascular:      Rate and Rhythm: Normal rate and regular rhythm.      Heart sounds: Normal heart sounds. No murmur. No friction rub. No gallop.    Pulmonary:      Effort: Pulmonary effort is normal. No respiratory distress.      Breath sounds: Normal breath sounds. No wheezing.   Abdominal:      General: Abdomen is flat. Bowel sounds are normal. There is no distension.      Palpations: Abdomen is soft.      Tenderness: There is no abdominal tenderness.   Skin:     General: Skin is warm.      Findings: No erythema.   Neurological:      Mental Status: She is alert.      Comments: Oriented to person but not to place or time.    Psychiatric:         Mood and Affect: Mood normal.         Behavior: Behavior is cooperative.         Significant Labs:   CBC:   Recent Labs   Lab 12/05/20  0531 12/06/20  0711   WBC 8.77 7.42   HGB 7.4* 7.1*   HCT 24.9* 24.0*    155     CMP:   Recent Labs   Lab 12/05/20  0531 12/06/20  0711   * 137   K 4.0 4.1    110   CO2 21* 23   GLU 97 87   BUN 21 21   CREATININE 0.8 0.7   CALCIUM 7.9* 8.1*   PROT 5.3* 5.3*   ALBUMIN 1.7* 1.7*   BILITOT 0.7 0.7   ALKPHOS 78 77   AST 24 22   ALT 14 16   ANIONGAP 6* 4*   EGFRNONAA >60.0 >60.0       Significant Imaging: I have reviewed all pertinent imaging results/findings within the past 24 hours.      Assessment/Plan:      * Sepsis without acute organ dysfunction  Bacteremia due to Klebsiella pneumoniae and Proteus Mirabilis  Klebsiella and Providencia urinary tract infection  · Sepsis resolved. Infection under control. WBC normal.   · Present on admit. Patient with positive blood culture with both Klebsiella pneumoniae and Proteus mirabilis. Urine culture also positive for Klebsiella and Providencia. Bacteria  felt related to urinary source. Patient had hydronephrosis and urinary retention on ultrasound on admit and likely reason patient developed infection subsequently.  · Repeat blood cultures on 11/28 were final no growth.  · Patient treated initially with broad spectrum antibiotics but when sensitivities returned on all organisms antibiotic switched to Cipro alone as all organisms sensitive and plan is to treat for a total of 14 days from last negative blood culture on 11/28 with end date of 12/12/2020.     Urinary retention  Other hydronephrosis  Patient with JOHNNY on admit and US showed hydronephrosis and Morelos placed and patient hydrated and JOHNNY resolved so likely obstructive uropathy related to urinary retention of unknown cause lead to JOHNNY. Plan to continue Morelos and will continue Morelos on discharge and patient to follow-up with Urology as outpatient.     Chronic combined systolic and diastolic heart failure  Chronic and controlled. Lisinopril held on admit due to JOHNNY and JOHNNY now revolved so plan to restart Lisinopril on discharge. Patient has not need for diuretics at this time as euvolemic and not on diuretics at home. Patient on Metoprolol for chronic heart failure and will continue.       Anemia of chronic disease  Present on admission. Patient with known chronic anemia related to anemia of chronic disease. Hgb 9.9 on admit on 11/27. Hgb since admit has been fluctuating between 7.4-8.5 after she was hydrated so initial Hgb likely was hemoconcentrated. Continue to monitor CBC in hospital and consider transfusion if Hgb < 7.         Essential hypertension  BP meds held on admit due to hypotension and JOHNNY and concern for sepsis and volume depletion. Patient restarted on low dose Metoprolol and BP stable. Plan to resume Lisinopril on discharge if BP tolerates. Patient normotensive at this time on Metoprolol alone. Target BP < 140/90.       Coronary artery disease involving native coronary artery of native heart  without angina pectoris  Chronic and controlled. Patient asymptomatic.Continue Plavix and Metoprolol and Lipitor to treat.       Memory deficits  Patient with waxing and waning mental state in hospital and known memory deficits. Patient appears to be at cognitive baseline according to family,. Continue delirium precautions.       Physical deconditioning  Present on admit. PT/OT consulted and recommending SNF placement on discharge and CM/SW working with family on SNF placement for hospital discharge and patient accepted and bed will be available at [facility] for 12/7. Patient medically ready for hospital discharge. .       Acquired hypothyroidism  Chronic and controlled. Continue Levothyroxine 50 mcg po daily to treat.         VTE Risk Mitigation (From admission, onward)         Ordered     IP VTE HIGH RISK PATIENT  Once      11/27/20 1952     Place sequential compression device  Until discontinued      11/27/20 1952     Place ZORAIDA hose  Until discontinued      11/27/20 1535     Place sequential compression device  Until discontinued      11/27/20 1535                Discharge Planning   ADRYAN: 12/7/2020     Code Status: Full Code   Is the patient medically ready for discharge?: Yes    Reason for patient still in hospital (select all that apply): Pending disposition  Discharge Plan A: Skilled Nursing Facility   Discharge Delays: (!) Post-Acute Set-up(Working on SNF placement.)        Mami Gaitan MD  Department of Hospital Medicine   [facility]

## 2020-12-06 NOTE — PT/OT/SLP PROGRESS
Occupational Therapy      Patient Name:  Ciara Lozano   MRN:  [id number]    Patient unable to be seen this date. Per chart review Patient continues to benefit from acute OT services. OT to continue progressive mobility and assistance with functional ADLs as appropriate. OT will follow-up     MADAN Jackson  12/6/2020

## 2020-12-06 NOTE — SUBJECTIVE & OBJECTIVE
Interval History: Patient awake and responsive. Patient with no complaints. Patient is awaiting NH SNF placement. Patient with no new issues overnight and remains medically stable.     Review of Systems   Unable to perform ROS: Dementia     Objective:     Vital Signs (Most Recent):  Temp: 96.4 °F (35.8 °C) (12/06/20 1540)  Pulse: 87 (12/06/20 1540)  Resp: 20 (12/06/20 1540)  BP: 149/65 (12/06/20 1540)  SpO2: 95 % (12/06/20 1540) on room air Vital Signs (24h Range):  Temp:  [96.4 °F (35.8 °C)-98.2 °F (36.8 °C)] 96.4 °F (35.8 °C)  Pulse:  [] 87  Resp:  [17-30] 20  SpO2:  [95 %-100 %] 95 %  BP: (110-168)/(58-88) 149/65     Weight: 75.2 kg (165 lb 12.6 oz)  Body mass index is 30.32 kg/m².    Intake/Output Summary (Last 24 hours) at 12/6/2020 1658  Last data filed at 12/6/2020 1500  Gross per 24 hour   Intake 540 ml   Output 600 ml   Net -60 ml      Physical Exam  Vitals signs and nursing note reviewed.   Constitutional:       General: She is not in acute distress.     Appearance: Normal appearance. She is obese. She is not ill-appearing.   Eyes:      Conjunctiva/sclera: Conjunctivae normal.   Neck:      Vascular: No JVD.   Cardiovascular:      Rate and Rhythm: Normal rate and regular rhythm.      Heart sounds: Normal heart sounds. No murmur. No friction rub. No gallop.    Pulmonary:      Effort: Pulmonary effort is normal. No respiratory distress.      Breath sounds: Normal breath sounds. No wheezing.   Abdominal:      General: Abdomen is flat. Bowel sounds are normal. There is no distension.      Palpations: Abdomen is soft.      Tenderness: There is no abdominal tenderness.   Skin:     General: Skin is warm.      Findings: No erythema.   Neurological:      Mental Status: She is alert.      Comments: Oriented to person but not to place or time.    Psychiatric:         Mood and Affect: Mood normal.         Behavior: Behavior is cooperative.         Significant Labs:   CBC:   Recent Labs   Lab 12/05/20  2260  12/06/20  0711   WBC 8.77 7.42   HGB 7.4* 7.1*   HCT 24.9* 24.0*    155     CMP:   Recent Labs   Lab 12/05/20  0531 12/06/20  0711   * 137   K 4.0 4.1    110   CO2 21* 23   GLU 97 87   BUN 21 21   CREATININE 0.8 0.7   CALCIUM 7.9* 8.1*   PROT 5.3* 5.3*   ALBUMIN 1.7* 1.7*   BILITOT 0.7 0.7   ALKPHOS 78 77   AST 24 22   ALT 14 16   ANIONGAP 6* 4*   EGFRNONAA >60.0 >60.0       Significant Imaging: I have reviewed all pertinent imaging results/findings within the past 24 hours.

## 2020-12-07 LAB
ALBUMIN SERPL BCP-MCNC: 1.9 G/DL (ref 3.5–5.2)
ALP SERPL-CCNC: 83 U/L (ref 55–135)
ALT SERPL W/O P-5'-P-CCNC: 16 U/L (ref 10–44)
ANION GAP SERPL CALC-SCNC: 8 MMOL/L (ref 8–16)
AST SERPL-CCNC: 23 U/L (ref 10–40)
BILIRUB SERPL-MCNC: 0.8 MG/DL (ref 0.1–1)
BUN SERPL-MCNC: 20 MG/DL (ref 8–23)
CALCIUM SERPL-MCNC: 8.3 MG/DL (ref 8.7–10.5)
CHLORIDE SERPL-SCNC: 109 MMOL/L (ref 95–110)
CO2 SERPL-SCNC: 21 MMOL/L (ref 23–29)
CREAT SERPL-MCNC: 0.8 MG/DL (ref 0.5–1.4)
EST. GFR  (AFRICAN AMERICAN): >60 ML/MIN/1.73 M^2
EST. GFR  (NON AFRICAN AMERICAN): >60 ML/MIN/1.73 M^2
GLUCOSE SERPL-MCNC: 91 MG/DL (ref 70–110)
POTASSIUM SERPL-SCNC: 3.9 MMOL/L (ref 3.5–5.1)
PROT SERPL-MCNC: 5.8 G/DL (ref 6–8.4)
SODIUM SERPL-SCNC: 138 MMOL/L (ref 136–145)

## 2020-12-07 PROCEDURE — 99232 PR SUBSEQUENT HOSPITAL CARE,LEVL II: ICD-10-PCS | Mod: ,,, | Performed by: INTERNAL MEDICINE

## 2020-12-07 PROCEDURE — 97535 SELF CARE MNGMENT TRAINING: CPT

## 2020-12-07 PROCEDURE — 97802 MEDICAL NUTRITION INDIV IN: CPT

## 2020-12-07 PROCEDURE — 25000003 PHARM REV CODE 250: Performed by: INTERNAL MEDICINE

## 2020-12-07 PROCEDURE — 99232 SBSQ HOSP IP/OBS MODERATE 35: CPT | Mod: ,,, | Performed by: INTERNAL MEDICINE

## 2020-12-07 PROCEDURE — 20600001 HC STEP DOWN PRIVATE ROOM

## 2020-12-07 PROCEDURE — 25000003 PHARM REV CODE 250: Performed by: HOSPITALIST

## 2020-12-07 PROCEDURE — 80053 COMPREHEN METABOLIC PANEL: CPT

## 2020-12-07 PROCEDURE — 97530 THERAPEUTIC ACTIVITIES: CPT

## 2020-12-07 PROCEDURE — 36415 COLL VENOUS BLD VENIPUNCTURE: CPT

## 2020-12-07 RX ORDER — CIPROFLOXACIN 500 MG/1
500 TABLET ORAL EVERY 12 HOURS
Start: 2020-12-07 | End: 2020-12-12

## 2020-12-07 RX ORDER — AMOXICILLIN 250 MG
1 CAPSULE ORAL 2 TIMES DAILY
Start: 2020-12-07

## 2020-12-07 RX ORDER — LISINOPRIL 10 MG/1
10 TABLET ORAL DAILY
Status: DISCONTINUED | OUTPATIENT
Start: 2020-12-08 | End: 2020-12-07

## 2020-12-07 RX ORDER — ACETAMINOPHEN 325 MG/1
650 TABLET ORAL EVERY 6 HOURS PRN
Refills: 0 | COMMUNITY
Start: 2020-12-07

## 2020-12-07 RX ORDER — LISINOPRIL 10 MG/1
10 TABLET ORAL DAILY
Status: DISCONTINUED | OUTPATIENT
Start: 2020-12-07 | End: 2020-12-11 | Stop reason: HOSPADM

## 2020-12-07 RX ORDER — LEVOTHYROXINE SODIUM 50 UG/1
50 TABLET ORAL
Start: 2020-12-08

## 2020-12-07 RX ORDER — TALC
6 POWDER (GRAM) TOPICAL NIGHTLY PRN
Refills: 0 | COMMUNITY
Start: 2020-12-07

## 2020-12-07 RX ORDER — METOPROLOL TARTRATE 25 MG/1
12.5 TABLET, FILM COATED ORAL 2 TIMES DAILY
Start: 2020-12-07

## 2020-12-07 RX ADMIN — SODIUM BICARBONATE 650 MG TABLET 650 MG: at 08:12

## 2020-12-07 RX ADMIN — DOCUSATE SODIUM 50MG AND SENNOSIDES 8.6MG 1 TABLET: 8.6; 5 TABLET, FILM COATED ORAL at 09:12

## 2020-12-07 RX ADMIN — METOPROLOL TARTRATE 12.5 MG: 25 TABLET, FILM COATED ORAL at 09:12

## 2020-12-07 RX ADMIN — CLOPIDOGREL 75 MG: 75 TABLET, FILM COATED ORAL at 08:12

## 2020-12-07 RX ADMIN — POLYETHYLENE GLYCOL 3350 17 G: 17 POWDER, FOR SOLUTION ORAL at 08:12

## 2020-12-07 RX ADMIN — CIPROFLOXACIN 500 MG: 500 TABLET, FILM COATED ORAL at 09:12

## 2020-12-07 RX ADMIN — DOCUSATE SODIUM 50MG AND SENNOSIDES 8.6MG 1 TABLET: 8.6; 5 TABLET, FILM COATED ORAL at 08:12

## 2020-12-07 RX ADMIN — LISINOPRIL 10 MG: 10 TABLET ORAL at 10:12

## 2020-12-07 RX ADMIN — CIPROFLOXACIN 500 MG: 500 TABLET, FILM COATED ORAL at 08:12

## 2020-12-07 RX ADMIN — METOPROLOL TARTRATE 12.5 MG: 25 TABLET, FILM COATED ORAL at 08:12

## 2020-12-07 RX ADMIN — ATORVASTATIN CALCIUM 80 MG: 20 TABLET, FILM COATED ORAL at 09:12

## 2020-12-07 RX ADMIN — LEVOTHYROXINE SODIUM 50 MCG: 50 TABLET ORAL at 08:12

## 2020-12-07 RX ADMIN — FERROUS SULFATE TAB EC 325 MG (65 MG FE EQUIVALENT) 325 MG: 325 (65 FE) TABLET DELAYED RESPONSE at 08:12

## 2020-12-07 NOTE — PLAN OF CARE
[facility]     Department of Hospital Medicine     [address] [place] [address]     [phone] after hours  [phone] fax       NURSING HOME ORDERS    12/07/2020    Admit to Nursing Home:  Skilled Bed                                            Diagnoses:  Active Hospital Problems    Diagnosis  POA    *Sepsis without acute organ dysfunction [A41.9]  Yes     Priority: 1 - High    Urinary retention [R33.9]  Yes     Priority: 3     Chronic combined systolic and diastolic heart failure [I50.42]  Yes     Priority: 4     Anemia of chronic disease [D63.8]  Yes     Priority: 5     Essential hypertension [I10]  Yes     Priority: 5     Coronary artery disease involving native coronary artery of native heart without angina pectoris [I25.10]  Yes     Priority: 6     Memory deficits [R41.3]  Yes     Priority: 8     Physical deconditioning [R53.81]  Yes     Priority: 9     Acquired hypothyroidism [E03.9]  Yes     Priority: 10     Klebsiella pneumoniae infection [A49.8]  Yes    Other hydronephrosis [N13.39]  Yes    Bacteremia due to Klebsiella pneumoniae and Proteus Mirabilis [R78.81, B96.1]  Yes    Klebsiella urinary tract infection [N30.00]  Yes    Proteinuria [R80.9]  Yes    LBBB (left bundle branch block) [I44.7]  Yes      Resolved Hospital Problems    Diagnosis Date Resolved POA    Septic encephalopathy [G93.41] 12/06/2020 Yes     Priority: 2     Acute metabolic encephalopathy [G93.41] 12/02/2020 Yes     Priority: 3     Acute renal failure superimposed on stage 3a chronic kidney disease [N17.9, N18.31] 12/03/2020 Yes     Priority: 3     Chronic retention of urine [R33.9] 12/02/2020 Yes    JOHNNY (acute kidney injury) [N17.9] 12/02/2020 Yes       Patient is homebound due to:  Sepsis without acute organ dysfunction    Allergies:  Review of patient's allergies indicates:   Allergen Reactions    Pollen extracts        Vitals: Every shift (Skilled Nursing  patients)        Code Status: Full Code     Diet: regular diet      Supplement: Boost or equivalent supplement 1 can with meals    Activities:   - Up in a chair each morning as tolerated   - Ambulate with assistance to bathroom   - May use walker, cane, or self-propelled wheelchair      LABS:  Per facility protocol      Nursing: Out of bed BID, Up with assistance    Nursing Precautions:           - Fall precautions per nursing home protocol   - Decubitus precautions:        -  for positioning   - Pressure reducing foam mattress   - Turn patient every two hours. Use wedge pillows to anchor patient    CONSULTS:     Physical Therapy to evaluate and treat 5 times a week     Occupational Therapy to evaluate and treat 5 times a week      MISCELLANEOUS CARE:  Morelos Care: Morelos to gravity and Empty Morelos bag as needed.  Change Morelos every month.      Medications:     Current Discharge Medication List      START taking these medications    Details   acetaminophen (TYLENOL) 325 MG tablet Take 2 tablets (650 mg total) by mouth every 6 (six) hours as needed (Mild pain or temperature > 100.4 F).  Refills: 0      ciprofloxacin HCl (CIPRO) 500 MG tablet Take 1 tablet (500 mg total) by mouth every 12 (twelve) hours. End date 12/12/2020. for 5 days      levothyroxine (SYNTHROID) 50 MCG tablet Take 1 tablet (50 mcg total) by mouth before breakfast.      melatonin (MELATIN) 3 mg tablet Take 2 tablets (6 mg total) by mouth nightly as needed for Insomnia.  Qty:  , Refills: 0      metoprolol tartrate (LOPRESSOR) 25 MG tablet Take 0.5 tablets (12.5 mg total) by mouth 2 (two) times daily.    Comments: .      senna-docusate 8.6-50 mg (PERICOLACE) 8.6-50 mg per tablet Take 1 tablet by mouth 2 (two) times daily.  Qty:           CONTINUE these medications which have NOT CHANGED    Details   aspirin (ECOTRIN) 81 MG EC tablet Take 81 mg by mouth once daily.      atorvastatin (LIPITOR) 80 MG tablet Take 1 tablet (80 mg total) by mouth  every evening.  Qty: 30 tablet, Refills: 5    Associated Diagnoses: Coronary artery disease, angina presence unspecified, unspecified vessel or lesion type, unspecified whether native or transplanted heart; Carotid artery disease, unspecified laterality; Essential hypertension      clopidogreL (PLAVIX) 75 mg tablet Take 75 mg by mouth once daily.      ferrous sulfate 325 (65 FE) MG EC tablet Take 1 tablet (325 mg total) by mouth once daily.  Qty: 30 tablet, Refills: 1      lisinopriL 10 MG tablet Take 1 tablet (10 mg total) by mouth once daily.  Qty: 30 tablet, Refills: 3    Associated Diagnoses: Essential hypertension      nitroGLYCERIN (NITROSTAT) 0.4 MG SL tablet Place 1 tablet (0.4 mg total) under the tongue every 5 (five) minutes as needed for Chest pain (no more tiana 3 doses).  Qty: 60 tablet, Refills: 11    Associated Diagnoses: Coronary artery disease, angina presence unspecified, unspecified vessel or lesion type, unspecified whether native or transplanted heart; Carotid artery disease, unspecified laterality         STOP taking these medications       furosemide (LASIX) 40 MG tablet Comments:   Reason for Stopping:         metoprolol succinate (TOPROL-XL) 25 MG 24 hr tablet Comments:   Reason for Stopping:                Follow-up:   Future Appointments   Date Time Provider Department Center   1/4/2021 11:00 AM Andreina Gonzales MD General acute hospitaly PCW       _________________________________  Mami Gaitan MD  12/07/2020

## 2020-12-07 NOTE — PT/OT/SLP PROGRESS
"Physical Therapy   Progress Note    Patient Name:  Ciara Lozano  MRN: [id number]  Recent Surgery: * No surgery found *      Recommendations:     Discharge Recommendations: Skilled Nursing Facility   Equipment recommendations: hospital bed  Barriers to discharge: Decreased caregiver support available  and Fall risk     Assessment:     Ciara Lozano is a 85 y.o. female admitted to [facility] on 11/27/2020 with medical diagnosis of Sepsis without acute organ dysfunction. Pt presents with weakness, impaired balance, decreased endurance, decreased safety awareness, impaired functional mobility , gait instability and decreased trunk control. Pt requires increased encouragement to participate in therapeutic activities, as she is hesitant to mobilize.  Ciara Lozano would benefit from continued acute PT intervention to address listed functional deficits, provide patient/caregiver education, reduce fall risk, and maximize (I) and safety with functional mobility. Once medically stable, recommending pt discharge to skilled nursing facility.     Rehab Prognosis: Good; patient would benefit from acute skilled PT services to address these deficits and reach maximum level of function.      Plan:     During this hospitalization, patient to be seen 3 x/week to address the identified rehab impairments via gait training, therapeutic activities, therapeutic exercises, neuromuscular re-education and progress towards stated goals.     Plan of Care Expires:  12/29/20  Plan of Care reviewed with: patient    This plan of care has been discussed with the patient/caregiver, who was included in its development and is in agreement with the identified goals and treatment plan.     Subjective     Communicated with RN prior to session.  Patient agreeable to participate with encouragement.     Patient comments/goals: "I didn't sleep well last night"     Pain/Comfort:  · Location: pt reported pain in RUE and BLEs with movement   · Pt did not rate on numeric " scale   · RN notified, pt assisted with repositioning for comfort     Patients cultural, spiritual, Roman Catholic conflicts given the current situation: No cultural, spiritual, or educational needs identified.    Objective:     Patient found HOB elevated with: bed alarm, cervantes catheter(visi)    General Precautions: Standard, fall   Orthopedic Precautions:N/A   Braces: N/A   Body mass index is 30.32 kg/m².  Oxygen Device: room air    Cognition:   Pt is Alert, with Flat affect during session.    Functional Mobility:    Bed Mobility:  · Supine > Sit: Moderate Assistance of 2   · Sit > Supine: Minimal Assistance    Transfers:   · Sit <> Stand Transfer: Moderate Assistance of 2 from EOB with no AD                   Gait: unable to perform due to BLE weakness and instability; pt also with limited motivation to progress mobility     Balance:  · Static Sit: Min-CGA range at EOB x ~8 minutes  · Static Stand: Max Assist with Hand-held assist x 2    Outcome Measure: AM-PAC 6 CLICK MOBILITY  Total Score:10     Patient/Caregiver Education and Therapeutic Activities/Exercises     Therapeutic activities aimed to increase pt's independence, safety, and efficiency with bed mobility and functional transfers. See above for assistance levels.     Therapist educated pt/caregiver regarding:    PT POC and goals for therapy    Safety with mobility and fall risk    Instruction on use of call button and importance of calling nursing staff for assistance with mobility     Patient/caregiver able to verbalize understanding; will follow-up with pt/caregiver during current admit for additional questions/concerns within scope of practice.     White board updated.     Patient left HOB elevated with all lines intact, call button in reach, bed alarm on and RN notified.    Goals:     Multidisciplinary Problems     Physical Therapy Goals        Problem: Physical Therapy Goal    Goal Priority Disciplines Outcome Goal Variances Interventions   Physical  Therapy Goal     PT, PT/OT Ongoing, Progressing     Description: Goals to be met by: 2020     Patient will increase functional independence with mobility by performin. Supine to sit with Moderate Assistance  2. Sit to supine with Moderate Assistance -- MET   Updated: Sit to supine with stand by assistance   3. Sit to stand transfer with Moderate Assistance  4. Bed to chair transfer with Moderate Assistance using LRAD  5. Gait x 10 feet with modA and appropriate AD  6. Pt will perform BLE therex x 15 reps with supervision to improve functional strength                       Time Tracking:       PT Received On: 20  PT Start Time: 0850     PT Stop Time: 907  PT Total Time (min): 17 min     Billable Minutes: Therapeutic Activity 17 min     Avril Diaz PT, DPT   2020  Pager: [phone]

## 2020-12-07 NOTE — PLAN OF CARE
No significant changes.  Patient remains lethargic.  Patient has been cooperative and pleasant with staff this shift.  Will continue to monitor.

## 2020-12-07 NOTE — PLAN OF CARE
Problem: Physical Therapy Goal  Goal: Physical Therapy Goal  Description: Goals to be met by: 2020     Patient will increase functional independence with mobility by performin. Supine to sit with Moderate Assistance  2. Sit to supine with Moderate Assistance -- MET   Updated: Sit to supine with stand by assistance   3. Sit to stand transfer with Moderate Assistance  4. Bed to chair transfer with Moderate Assistance using LRAD  5. Gait x 10 feet with modA and appropriate AD  6. Pt will perform BLE therex x 15 reps with supervision to improve functional strength      Outcome: Ongoing, Progressing     Goals remain appropriate. Continue current POC, progressing as tolerated.     Avril Diaz PT, DPT   2020  Pager: [phone]

## 2020-12-07 NOTE — PLAN OF CARE
Recommendations     1. Consider liberalizing diet to Low sodium, add Boost Plus ONS BID to aid in caloric intake.   2. RD to monitor & follow-up.     Goals: Meet % EEN, EPN by RD f/u date  Nutrition Goal Status: new  Communication of RD Recs: reviewed with RN

## 2020-12-07 NOTE — PROGRESS NOTES
"  [facility]  Adult Nutrition  Consult Note    SUMMARY     Recommendations    1. Consider liberalizing diet to Low sodium, add Boost Plus ONS BID to aid in caloric intake.   2. RD to monitor & follow-up.    Goals: Meet % EEN, EPN by RD f/u date  Nutrition Goal Status: new  Communication of RD Recs: reviewed with RN    Reason for Assessment    Reason For Assessment: length of stay  Diagnosis: other (see comments)(Sepsis)  Relevant Medical History: CKD, HF  Interdisciplinary Rounds: did not attend    General Information Comments: Pt tolerating diet w/ 25-50% PO intake. Patient appears nourished with no physical signs of malnutrition and no weight loss PTA (UBW: 170#).  Nutrition Discharge Planning: Adequate PO intake    Nutrition/Diet History    Spiritual, Cultural Beliefs, Hindu Practices, Values that Affect Care: no  Factors Affecting Nutritional Intake: decreased appetite    Anthropometrics    Temp: 97.6 °F (36.4 °C)  Height: 5' 2" (157.5 cm)  Height (inches): 62 in  Weight Method: Bed Scale  Weight: 75.2 kg (165 lb 12.6 oz)  Weight (lb): 165.79 lb  Ideal Body Weight (IBW), Female: 110 lb  % Ideal Body Weight, Female (lb): 150.72 %  BMI (Calculated): 30.3  BMI Grade: 30 - 34.9- obesity - grade I    Lab/Procedures/Meds    Pertinent Labs Reviewed: reviewed  Pertinent Medications Reviewed: reviewed    Estimated/Assessed Needs    Weight Used For Calorie Calculations: 75.2 kg (165 lb 12.6 oz)     Energy Calorie Requirements (kcal): 1444 kcal/d  Energy Need Method: [place]-St Jeor(1.25 PAL)     Protein Requirements: 75-90 g/d (1-1.2 g/kg)  Weight Used For Protein Calculations: 75.2 kg (165 lb 12.6 oz)     Estimated Fluid Requirement Method: other (see comments)(Per MD or 1 mL/kcal)  RDA Method (mL): 1444    Nutrition Prescription Ordered    Current Diet Order: Renal    Evaluation of Received Nutrient/Fluid Intake    Comments: LBM: 12/6    Tolerance: tolerating    Nutrition Risk    Level of " Risk/Frequency of Follow-up: (1x/week)     Assessment and Plan    Nutrition Problem  Inadequate energy intake    Related to (etiology):   Decreased appetite     Signs and Symptoms (as evidenced by):   PO intake 25-50%    Interventions(treatment strategy):  Collaboration of nutrition care w/ other providers  ONS    Nutrition Diagnosis Status:   New     Monitor and Evaluation    Food and Nutrient Intake: energy intake, food and beverage intake  Food and Nutrient Adminstration: diet order  Physical Activity and Function: nutrition-related ADLs and IADLs  Anthropometric Measurements: weight, weight change  Biochemical Data, Medical Tests and Procedures: glucose/endocrine profile, inflammatory profile, lipid profile, gastrointestinal profile, electrolyte and renal panel  Nutrition-Focused Physical Findings: overall appearance     Malnutrition Assessment    Orbital Region (Subcutaneous Fat Loss): well nourished  Upper Arm Region (Subcutaneous Fat Loss): well nourished  Thoracic and Lumbar Region: well nourished   Denominational Region (Muscle Loss): well nourished  Clavicle Bone Region (Muscle Loss): well nourished  Scapular Bone Region (Muscle Loss): well nourished  Dorsal Hand (Muscle Loss): well nourished  Patellar Region (Muscle Loss): well nourished     Nutrition Follow-Up    RD Follow-up?: Yes

## 2020-12-07 NOTE — PLAN OF CARE
SW attempted to call pt's granddaughter, Lynnette ([phone]) a total of three (3) times to get other choices for SNF placement.  Each time, SW was unable to reach [place].  Message was left at 1006am with no return call.  The other two times, the call went to a vm box that stated it was full and could not receive any more messages.  SW then tried to call pt's other listed grandchildren, Norma ([phone]--phone disconnected) and Hayden ([phone]--phone rang 12 times with no answer).  Pt is medically ready for DC but family cannot be located in order to get more SNF choices.  GLADYS and MADDIE will go to pt's room to see if family is physically present and will F/U.        Tere Pereira, [facility]  Ext [phone]

## 2020-12-07 NOTE — PLAN OF CARE
12/07/20 1553   Post-Acute Status   Post-Acute Authorization Placement   Post-Acute Placement Status Referrals Sent   Discharge Plan   Discharge Plan A Skilled Nursing Facility     SW was able to reach pt's granddaughter, Lynnette, at 335pm and was asked to send referrals to [facility], [facility], [facility], [facility] and [facility].  Referral sent via [facility].      Teer Pereira, [facility]  Ext [phone]

## 2020-12-07 NOTE — PT/OT/SLP PROGRESS
"Occupational Therapy  Co- Treatment    Name: Ciara oLzano  MRN: [id number]  Admitting Diagnosis:  Sepsis without acute organ dysfunction       Recommendations:     Discharge Recommendations: nursing facility, skilled  Discharge Equipment Recommendations:  other (see comments)(TBD)  Barriers to discharge:  Decreased caregiver support  Co-treatment performed due to patient's multiple deficits requiring two skilled therapistsng to appropriately and safely assess patient's strength and endurance while facilitating functional tasks in addition to accommodating for patient's activity tolerance.     Assessment:     Ciara Lozano is a 85 y.o. female with a medical diagnosis of Sepsis without acute organ dysfunction.  She presents with deficits in self-care tasks, mobility, as well as level of endurance. Pt. With c/o of pain in RUE on this date. Performance deficits affecting function are impaired endurance, weakness, impaired self care skills, impaired functional mobilty. Pt. Would benefit from continued OT services but does require Max vc's for encouragement.    Rehab Prognosis:  Fair; patient would benefit from acute skilled OT services to address these deficits and reach maximum level of function.       Plan:     Patient to be seen 3 x/week to address the above listed problems via self-care/home management, therapeutic activities, therapeutic exercises  · Plan of Care Expires: 12/29/20  · Plan of Care Reviewed with: patient    Subjective     " I did not sleep last night"    Pain/Comfort:  · Pain Rating 1: (did not quantify)  · Location - Side 1: Right  · Location - Orientation 1: lower  · Location 1: arm  · Pain Addressed 1: Reposition, Distraction  · Pain Rating Post-Intervention 1: (appeared more comfortable)    Objective:     Communicated with: nurse prior to session.  Patient found supine with bed alarm, cervantes catheter(visi) upon OT entry to room.    General Precautions: Standard, fall   Orthopedic Precautions:N/A "   Braces: N/A     Occupational Performance:     Bed Mobility:    · Patient completed Supine to Sit with moderate assistance and 2 persons  · Patient completed Sit to Supine with minimum assistance     Functional Mobility/Transfers:  · Patient completed Sit <> Stand Transfer with moderate assistance and of 2 persons  with  no assistive device   · Functional Mobility: not tested    Activities of Daily Living:  · Grooming: minimum assistance for balance seated EOB when washing face and combing hair      AMPA 6 Click ADL: 12    Treatment & Education:  Pt. Sat EOB with Close CGA/Min A 2/2 to pt. Leaning posteriorly at times  Pt. Educated on importance of OOB/therapy to get better, prevent blood clots and improve respiratory status.     Patient left left sidelying with all lines intact, call button in reach and bed alarm onEducation:      GOALS:   Multidisciplinary Problems     Occupational Therapy Goals        Problem: Occupational Therapy Goal    Goal Priority Disciplines Outcome Interventions   Occupational Therapy Goal     OT, PT/OT Ongoing, Progressing    Description: Goals to be met by: 12/13/20     Patient will increase functional independence with ADLs by performing:    Feeding with Set-up Assistance.  UE Dressing with Stand-by Assistance.  LE Dressing with Moderate Assistance.  Grooming while standing with Minimal Assistance.  Toileting from bedside commode with Moderate Assistance for hygiene and clothing management.   Toilet transfer to bedside commode with Minimal Assistance.                     Time Tracking:     OT Date of Treatment: 12/07/20  OT Start Time: 0851  OT Stop Time: 0907  OT Total Time (min): 16 min    Billable Minutes:Self Care/Home Management 16    MADAN Miller  12/7/2020

## 2020-12-08 PROBLEM — A41.9 SEPSIS WITHOUT ACUTE ORGAN DYSFUNCTION: Status: RESOLVED | Noted: 2020-11-27 | Resolved: 2020-12-08

## 2020-12-08 LAB
BASOPHILS # BLD AUTO: 0.02 K/UL (ref 0–0.2)
BASOPHILS NFR BLD: 0.3 % (ref 0–1.9)
DIFFERENTIAL METHOD: ABNORMAL
EOSINOPHIL # BLD AUTO: 0.1 K/UL (ref 0–0.5)
EOSINOPHIL NFR BLD: 0.7 % (ref 0–8)
ERYTHROCYTE [DISTWIDTH] IN BLOOD BY AUTOMATED COUNT: 21.6 % (ref 11.5–14.5)
HCT VFR BLD AUTO: 23.4 % (ref 37–48.5)
HGB BLD-MCNC: 7 G/DL (ref 12–16)
IMM GRANULOCYTES # BLD AUTO: 0.1 K/UL (ref 0–0.04)
IMM GRANULOCYTES NFR BLD AUTO: 1.4 % (ref 0–0.5)
LYMPHOCYTES # BLD AUTO: 1.4 K/UL (ref 1–4.8)
LYMPHOCYTES NFR BLD: 19.1 % (ref 18–48)
MCH RBC QN AUTO: 25.2 PG (ref 27–31)
MCHC RBC AUTO-ENTMCNC: 29.9 G/DL (ref 32–36)
MCV RBC AUTO: 84 FL (ref 82–98)
MONOCYTES # BLD AUTO: 0.6 K/UL (ref 0.3–1)
MONOCYTES NFR BLD: 8.1 % (ref 4–15)
NEUTROPHILS # BLD AUTO: 5 K/UL (ref 1.8–7.7)
NEUTROPHILS NFR BLD: 70.4 % (ref 38–73)
NRBC BLD-RTO: 0 /100 WBC
PLATELET # BLD AUTO: 178 K/UL (ref 150–350)
PMV BLD AUTO: 11.1 FL (ref 9.2–12.9)
RBC # BLD AUTO: 2.78 M/UL (ref 4–5.4)
WBC # BLD AUTO: 7.08 K/UL (ref 3.9–12.7)

## 2020-12-08 PROCEDURE — 63600175 PHARM REV CODE 636 W HCPCS: Performed by: INTERNAL MEDICINE

## 2020-12-08 PROCEDURE — 25000003 PHARM REV CODE 250: Performed by: HOSPITALIST

## 2020-12-08 PROCEDURE — 20600001 HC STEP DOWN PRIVATE ROOM

## 2020-12-08 PROCEDURE — 99232 PR SUBSEQUENT HOSPITAL CARE,LEVL II: ICD-10-PCS | Mod: ,,, | Performed by: INTERNAL MEDICINE

## 2020-12-08 PROCEDURE — 25000003 PHARM REV CODE 250: Performed by: INTERNAL MEDICINE

## 2020-12-08 PROCEDURE — 36415 COLL VENOUS BLD VENIPUNCTURE: CPT

## 2020-12-08 PROCEDURE — 99232 SBSQ HOSP IP/OBS MODERATE 35: CPT | Mod: ,,, | Performed by: INTERNAL MEDICINE

## 2020-12-08 PROCEDURE — 85025 COMPLETE CBC W/AUTO DIFF WBC: CPT

## 2020-12-08 RX ADMIN — DOCUSATE SODIUM 50MG AND SENNOSIDES 8.6MG 1 TABLET: 8.6; 5 TABLET, FILM COATED ORAL at 08:12

## 2020-12-08 RX ADMIN — FERROUS SULFATE TAB EC 325 MG (65 MG FE EQUIVALENT) 325 MG: 325 (65 FE) TABLET DELAYED RESPONSE at 08:12

## 2020-12-08 RX ADMIN — LISINOPRIL 10 MG: 10 TABLET ORAL at 08:12

## 2020-12-08 RX ADMIN — CLOPIDOGREL 75 MG: 75 TABLET, FILM COATED ORAL at 08:12

## 2020-12-08 RX ADMIN — LEVOTHYROXINE SODIUM 50 MCG: 50 TABLET ORAL at 08:12

## 2020-12-08 RX ADMIN — METOPROLOL TARTRATE 12.5 MG: 25 TABLET, FILM COATED ORAL at 08:12

## 2020-12-08 RX ADMIN — POLYETHYLENE GLYCOL 3350 17 G: 17 POWDER, FOR SOLUTION ORAL at 08:12

## 2020-12-08 RX ADMIN — CIPROFLOXACIN 500 MG: 500 TABLET, FILM COATED ORAL at 08:12

## 2020-12-08 RX ADMIN — MELATONIN TAB 3 MG 6 MG: 3 TAB at 08:12

## 2020-12-08 RX ADMIN — ATORVASTATIN CALCIUM 80 MG: 20 TABLET, FILM COATED ORAL at 08:12

## 2020-12-08 RX ADMIN — ACETAMINOPHEN 650 MG: 325 TABLET ORAL at 08:12

## 2020-12-08 NOTE — PLAN OF CARE
12/08/20 1531   Discharge Reassessment   Assessment Type Discharge Planning Reassessment   Provided patient/caregiver education on the expected discharge date and the discharge plan Yes   Do you have any problems affording any of your prescribed medications? No   Discharge Plan A Skilled Nursing Facility   Discharge Plan B Home Health   DME Needed Upon Discharge  other (see comments)  (TBD)   Anticipated Discharge Disposition SNF   Post-Acute Status   Post-Acute Authorization Placement   Post-Acute Placement Status Referrals Sent   Discharge Delays (!) Post-Acute Set-up

## 2020-12-08 NOTE — SUBJECTIVE & OBJECTIVE
Interval History: Patient with no issues overnight and medically ready for hospital discharge. Patient was denied by several SNFs that daughter chose so CM/SW had to send more SNF referrals today. For now will stop daily labs as patient is just awaiting placement at this point and labs have been stable. Patient denies any pain.     Review of Systems   Unable to perform ROS: Dementia     Objective:     Vital Signs (Most Recent):  Temp: 97.4 °F (36.3 °C) (12/07/20 1715)  Pulse: 88 (12/07/20 1715)  Resp: 15 (12/07/20 1715)  BP: 135/63 (12/07/20 1715)  SpO2: 98 % (12/07/20 1715) Vital Signs (24h Range):  Temp:  [97.3 °F (36.3 °C)-99 °F (37.2 °C)] 97.4 °F (36.3 °C)  Pulse:  [] 88  Resp:  [10-30] 15  SpO2:  [96 %-99 %] 98 %  BP: (112-183)/(53-95) 135/63     Weight: 75.2 kg (165 lb 12.6 oz)  Body mass index is 30.32 kg/m².    Intake/Output Summary (Last 24 hours) at 12/7/2020 1827  Last data filed at 12/7/2020 1100  Gross per 24 hour   Intake --   Output 650 ml   Net -650 ml      Physical Exam  Vitals signs and nursing note reviewed.   Constitutional:       General: She is not in acute distress.     Appearance: Normal appearance. She is obese. She is not ill-appearing.   Eyes:      Conjunctiva/sclera: Conjunctivae normal.   Neck:      Vascular: No JVD.   Cardiovascular:      Rate and Rhythm: Normal rate and regular rhythm.      Heart sounds: Normal heart sounds. No murmur. No friction rub. No gallop.    Pulmonary:      Effort: Pulmonary effort is normal. No respiratory distress.      Breath sounds: Normal breath sounds. No wheezing.   Abdominal:      General: Abdomen is flat. Bowel sounds are normal. There is no distension.      Palpations: Abdomen is soft.      Tenderness: There is no abdominal tenderness.   Skin:     General: Skin is warm.      Findings: No erythema.   Neurological:      Mental Status: She is alert.      Comments: Oriented to person but not to place or time.    Psychiatric:         Mood and Affect:  Mood normal.         Behavior: Behavior is cooperative.         Significant Labs:   CMP:   Recent Labs   Lab 12/06/20  0711 12/07/20  0440    138   K 4.1 3.9    109   CO2 23 21*   GLU 87 91   BUN 21 20   CREATININE 0.7 0.8   CALCIUM 8.1* 8.3*   PROT 5.3* 5.8*   ALBUMIN 1.7* 1.9*   BILITOT 0.7 0.8   ALKPHOS 77 83   AST 22 23   ALT 16 16   ANIONGAP 4* 8   EGFRNONAA >60.0 >60.0       Significant Imaging: I have reviewed all pertinent imaging results/findings within the past 24 hours.

## 2020-12-08 NOTE — ASSESSMENT & PLAN NOTE
Bacteremia due to Klebsiella pneumoniae and Proteus Mirabilis  Klebsiella and Providencia urinary tract infection  · Sepsis resolved. Infection under control. WBC normal. Patient on oral Cipro to treat UTI/bacteremia with end date of 12/12/2020.   · Present on admit. Patient with positive blood culture with both Klebsiella pneumoniae and Proteus mirabilis. Urine culture also positive for Klebsiella and Providencia. Bacteria felt related to urinary source. Patient had hydronephrosis and urinary retention on ultrasound on admit and likely reason patient developed infection subsequently.  · Repeat blood cultures on 11/28 were final no growth.  · Patient treated initially with broad spectrum antibiotics but when sensitivities returned on all organisms antibiotic switched to Cipro alone as all organisms sensitive and plan is to treat for a total of 14 days from last negative blood culture on 11/28 with end date of 12/12/2020.

## 2020-12-08 NOTE — PROGRESS NOTES
"[facility] Hospital Medicine  Progress Note    Patient Name: Ciara Lozano  MRN: [id number]  Patient Class: IP- Inpatient   Admission Date: 11/27/2020  Length of Stay: 10 days  Attending Physician: Mami Gaitan MD  Primary Care Provider: Andreina Gonzales MD    [facility] Medicine Team: [facility] K Mami Gaitan MD    Subjective:     Principal Problem:Sepsis without acute organ dysfunction        HPI:  Ms. Ciara Lozano is a 85 y.o. female with CKD 3, chronic combined systolic and diastolic heart failure, HTN, CAD, memory issues, anemia of chronic disease, who was in her usual state of health which is unclear what that is but seems deconditioned. She cannot give much hx due to encephalopathy from sepsis/uti and likely mild uremia also from her JOHNNY on CKD3. She cant say what brought her here, doesn't answer a lot of questions. Says "you ask the same ones that the other guys did". Says its 2020, [facility], and [facility]. Reports pain to her legs but no falls. Does not answer when asked about hydration or urine symptoms the last few days.  In ER retaining urine requiring straight cath and UA consistent with UTI. Johnny on admit, given small bolus in the ER.  TSH low found in the ER, CXR stable. FeNa compatible with post obstructive JOHNNY, low threshold cervantes if retains further, U/S kidneys ordered.  Trop mildly up, chronic issue with this (similar in earlier 2020), will trend.  Denies chest pain.       Overview/Hospital Course:  Patient admitted with sepsis and encephalopathy felt related to UTI. Ultrasound done of kidneys and showed mild bilateral hydronephrosis and felt to be a chronic process. Cervantes placed and patient noted to be retaining urine and Cervantes relieved hydronephrosis and urinary retention. Patient had JOHNNY also on admit with Cr 3.0 and felt related to urinary retention/sepsis and improved with Cervantes and IVF's and Cr normalized back to baseline levels in hospital. U/A " positive for infection and blood and urine cultures done and sent ot lab. Patient started on IV Rocephin to treat. Blood cultures returned positive for Proteus mirabilis and Klebsiella pneumoniae. Urine culture positive for > 100,000 organisms with Klebsiella and Providencia. Sepsis improved and resolved with antibiotics. Sensitivities reviewed and patient de-escalated to Cipro alone on 12/1 to treat bacteremia and UTI. Bacteremia related to UTI as source and UTI felt related to chronic hydronephrosis and urinary retention. Patient to be discharged with Morelos in place and outpatient Urology follow-up. PT/OT consulted in hospital and recommending SNF on discharge and referrals sent and patient denied by daughters first choices for SNF so more referrals sent on 12/7. Encephalopathy much improved from admit but mental state does wax and wane throughout day time and family reports patient has been doing this for months and most of day she likes to sleep and also observed in hospital. Patient medically ready for hospital discharge and awaiting SNF placement.     Interval History: Patient with no issues overnight and medically ready for hospital discharge. Patient was denied by several SNFs that daughter chose so CM/SW had to send more SNF referrals today. For now will stop daily labs as patient is just awaiting placement at this point and labs have been stable. Patient denies any pain.     Review of Systems   Unable to perform ROS: Dementia     Objective:     Vital Signs (Most Recent):  Temp: 97.4 °F (36.3 °C) (12/07/20 1715)  Pulse: 88 (12/07/20 1715)  Resp: 15 (12/07/20 1715)  BP: 135/63 (12/07/20 1715)  SpO2: 98 % (12/07/20 1715) Vital Signs (24h Range):  Temp:  [97.3 °F (36.3 °C)-99 °F (37.2 °C)] 97.4 °F (36.3 °C)  Pulse:  [] 88  Resp:  [10-30] 15  SpO2:  [96 %-99 %] 98 %  BP: (112-183)/(53-95) 135/63     Weight: 75.2 kg (165 lb 12.6 oz)  Body mass index is 30.32 kg/m².    Intake/Output Summary (Last 24 hours)  at 12/7/2020 1827  Last data filed at 12/7/2020 1100  Gross per 24 hour   Intake --   Output 650 ml   Net -650 ml      Physical Exam  Vitals signs and nursing note reviewed.   Constitutional:       General: She is not in acute distress.     Appearance: Normal appearance. She is obese. She is not ill-appearing.   Eyes:      Conjunctiva/sclera: Conjunctivae normal.   Neck:      Vascular: No JVD.   Cardiovascular:      Rate and Rhythm: Normal rate and regular rhythm.      Heart sounds: Normal heart sounds. No murmur. No friction rub. No gallop.    Pulmonary:      Effort: Pulmonary effort is normal. No respiratory distress.      Breath sounds: Normal breath sounds. No wheezing.   Abdominal:      General: Abdomen is flat. Bowel sounds are normal. There is no distension.      Palpations: Abdomen is soft.      Tenderness: There is no abdominal tenderness.   Skin:     General: Skin is warm.      Findings: No erythema.   Neurological:      Mental Status: She is alert.      Comments: Oriented to person but not to place or time.    Psychiatric:         Mood and Affect: Mood normal.         Behavior: Behavior is cooperative.         Significant Labs:   CMP:   Recent Labs   Lab 12/06/20  0711 12/07/20  0440    138   K 4.1 3.9    109   CO2 23 21*   GLU 87 91   BUN 21 20   CREATININE 0.7 0.8   CALCIUM 8.1* 8.3*   PROT 5.3* 5.8*   ALBUMIN 1.7* 1.9*   BILITOT 0.7 0.8   ALKPHOS 77 83   AST 22 23   ALT 16 16   ANIONGAP 4* 8   EGFRNONAA >60.0 >60.0       Significant Imaging: I have reviewed all pertinent imaging results/findings within the past 24 hours.      Assessment/Plan:      * Sepsis without acute organ dysfunction  Bacteremia due to Klebsiella pneumoniae and Proteus Mirabilis  Klebsiella and Providencia urinary tract infection  · Sepsis resolved. Infection under control. WBC normal. Patient on oral Cipro to treat UTI/bacteremia with end date of 12/12/2020.   · Present on admit. Patient with positive blood culture  with both Klebsiella pneumoniae and Proteus mirabilis. Urine culture also positive for Klebsiella and Providencia. Bacteria felt related to urinary source. Patient had hydronephrosis and urinary retention on ultrasound on admit and likely reason patient developed infection subsequently.  · Repeat blood cultures on 11/28 were final no growth.  · Patient treated initially with broad spectrum antibiotics but when sensitivities returned on all organisms antibiotic switched to Cipro alone as all organisms sensitive and plan is to treat for a total of 14 days from last negative blood culture on 11/28 with end date of 12/12/2020.     Urinary retention  Other hydronephrosis  Patient with JOHNNY on admit and US showed hydronephrosis and Morelos placed and patient hydrated and JOHNNY resolved so likely obstructive uropathy related to urinary retention of unknown cause lead to JOHNNY. Plan to continue Morelos and will continue Morelos on discharge and patient to follow-up with Urology as outpatient.     Chronic combined systolic and diastolic heart failure  Chronic and controlled. Lisinopril held on admit due to JOHNNY and JOHNNY now revolved so plan to restart Lisinopril. Patient has not need for diuretics at this time as euvolemic and not on diuretics at home. Patient on Metoprolol for chronic heart failure and will continue.       Anemia of chronic disease  Present on admission. Patient with known chronic anemia related to anemia of chronic disease. Hgb 9.9 on admit on 11/27. Hgb since admit has been fluctuating between 7.1-8.5 after she was hydrated so initial Hgb likely was hemoconcentrated. Patient started on oral Ferrous sulfate 325 mg po daily to treat anemia and will continue. Minimize any unnecessary blood draws.        Essential hypertension  BP meds held on admit due to hypotension and JOHNNY and concern for sepsis and volume depletion. Patient restarted on low dose Metoprolol and BP stable. Plan to resume home Lisinopril 10 mg po daily  on 12/8. Target BP < 140/90.       Coronary artery disease involving native coronary artery of native heart without angina pectoris  Chronic and controlled. Patient asymptomatic.Continue Plavix and Metoprolol and Lipitor to treat.       Memory deficits  Patient with waxing and waning mental state in hospital and known memory deficits. Patient appears to be at cognitive baseline according to family,. Continue delirium precautions.       Physical deconditioning  Present on admit. PT/OT consulted and recommending SNF placement on discharge and CM/SW working with family on SNF placement for hospital discharge. Patient medically ready for hospital discharge.      Acquired hypothyroidism  Chronic and controlled. Continue Levothyroxine 50 mcg po daily to treat.         VTE Risk Mitigation (From admission, onward)         Ordered     IP VTE HIGH RISK PATIENT  Once      11/27/20 1952     Place sequential compression device  Until discontinued      11/27/20 1952     Place ZORAIDA hose  Until discontinued      11/27/20 1535     Place sequential compression device  Until discontinued      11/27/20 1535                Discharge Planning   ADRYAN: 12/8/2020     Code Status: Full Code   Is the patient medically ready for discharge?: Yes    Reason for patient still in hospital (select all that apply): Pending disposition  Discharge Plan A: Skilled Nursing Facility   Discharge Delays: (!) Post-Acute Set-up(Working on SNF placement.)        Mami Gaitan MD  Department of Hospital Medicine   [facility]

## 2020-12-08 NOTE — ASSESSMENT & PLAN NOTE
Chronic and controlled. Lisinopril held on admit due to JOHNNY and JOHNNY now revolved so plan to restart Lisinopril. Patient has not need for diuretics at this time as euvolemic and not on diuretics at home. Patient on Metoprolol for chronic heart failure and will continue.

## 2020-12-08 NOTE — ASSESSMENT & PLAN NOTE
BP meds held on admit due to hypotension and JOHNNY and concern for sepsis and volume depletion. Patient restarted on low dose Metoprolol and BP stable. Plan to resume home Lisinopril 10 mg po daily on 12/8. Target BP < 140/90.

## 2020-12-08 NOTE — ASSESSMENT & PLAN NOTE
Present on admission. Patient with known chronic anemia related to anemia of chronic disease. Hgb 9.9 on admit on 11/27. Hgb since admit has been fluctuating between 7.1-8.5 after she was hydrated so initial Hgb likely was hemoconcentrated. Patient started on oral Ferrous sulfate 325 mg po daily to treat anemia and will continue. Minimize any unnecessary blood draws.

## 2020-12-08 NOTE — ASSESSMENT & PLAN NOTE
Present on admit. PT/OT consulted and recommending SNF placement on discharge and CM/SW working with family on SNF placement for hospital discharge. Patient medically ready for hospital discharge.

## 2020-12-09 LAB
ABO + RH BLD: NORMAL
BASOPHILS # BLD AUTO: 0.02 K/UL (ref 0–0.2)
BASOPHILS NFR BLD: 0.3 % (ref 0–1.9)
BLD GP AB SCN CELLS X3 SERPL QL: NORMAL
BLD PROD TYP BPU: NORMAL
BLOOD GROUP ANTIBODIES SERPL: NORMAL
BLOOD UNIT EXPIRATION DATE: NORMAL
BLOOD UNIT TYPE CODE: 5100
BLOOD UNIT TYPE: NORMAL
CODING SYSTEM: NORMAL
DAT IGG-SP REAG RBC-IMP: NORMAL
DIFFERENTIAL METHOD: ABNORMAL
DISPENSE STATUS: NORMAL
EOSINOPHIL # BLD AUTO: 0.1 K/UL (ref 0–0.5)
EOSINOPHIL NFR BLD: 1.5 % (ref 0–8)
ERYTHROCYTE [DISTWIDTH] IN BLOOD BY AUTOMATED COUNT: 21.9 % (ref 11.5–14.5)
HCT VFR BLD AUTO: 21.7 % (ref 37–48.5)
HGB BLD-MCNC: 6.5 G/DL (ref 12–16)
IMM GRANULOCYTES # BLD AUTO: 0.1 K/UL (ref 0–0.04)
IMM GRANULOCYTES NFR BLD AUTO: 1.7 % (ref 0–0.5)
LYMPHOCYTES # BLD AUTO: 1.5 K/UL (ref 1–4.8)
LYMPHOCYTES NFR BLD: 25 % (ref 18–48)
MCH RBC QN AUTO: 25.4 PG (ref 27–31)
MCHC RBC AUTO-ENTMCNC: 30 G/DL (ref 32–36)
MCV RBC AUTO: 85 FL (ref 82–98)
MONOCYTES # BLD AUTO: 0.6 K/UL (ref 0.3–1)
MONOCYTES NFR BLD: 9.5 % (ref 4–15)
NEUTROPHILS # BLD AUTO: 3.6 K/UL (ref 1.8–7.7)
NEUTROPHILS NFR BLD: 62 % (ref 38–73)
NRBC BLD-RTO: 0 /100 WBC
PLATELET # BLD AUTO: 181 K/UL (ref 150–350)
PMV BLD AUTO: 10.8 FL (ref 9.2–12.9)
RBC # BLD AUTO: 2.56 M/UL (ref 4–5.4)
TRANS ERYTHROCYTES VOL PATIENT: NORMAL ML
WBC # BLD AUTO: 5.88 K/UL (ref 3.9–12.7)

## 2020-12-09 PROCEDURE — 25000003 PHARM REV CODE 250: Performed by: INTERNAL MEDICINE

## 2020-12-09 PROCEDURE — 86880 COOMBS TEST DIRECT: CPT

## 2020-12-09 PROCEDURE — 86870 RBC ANTIBODY IDENTIFICATION: CPT

## 2020-12-09 PROCEDURE — 99232 PR SUBSEQUENT HOSPITAL CARE,LEVL II: ICD-10-PCS | Mod: ,,, | Performed by: INTERNAL MEDICINE

## 2020-12-09 PROCEDURE — P9021 RED BLOOD CELLS UNIT: HCPCS

## 2020-12-09 PROCEDURE — 85025 COMPLETE CBC W/AUTO DIFF WBC: CPT

## 2020-12-09 PROCEDURE — 20600001 HC STEP DOWN PRIVATE ROOM

## 2020-12-09 PROCEDURE — 36415 COLL VENOUS BLD VENIPUNCTURE: CPT

## 2020-12-09 PROCEDURE — 86901 BLOOD TYPING SEROLOGIC RH(D): CPT

## 2020-12-09 PROCEDURE — 25000003 PHARM REV CODE 250: Performed by: STUDENT IN AN ORGANIZED HEALTH CARE EDUCATION/TRAINING PROGRAM

## 2020-12-09 PROCEDURE — 86860 RBC ANTIBODY ELUTION: CPT

## 2020-12-09 PROCEDURE — 25000003 PHARM REV CODE 250: Performed by: HOSPITALIST

## 2020-12-09 PROCEDURE — 36430 TRANSFUSION BLD/BLD COMPNT: CPT

## 2020-12-09 PROCEDURE — 86922 COMPATIBILITY TEST ANTIGLOB: CPT

## 2020-12-09 PROCEDURE — 99232 SBSQ HOSP IP/OBS MODERATE 35: CPT | Mod: ,,, | Performed by: INTERNAL MEDICINE

## 2020-12-09 PROCEDURE — 97535 SELF CARE MNGMENT TRAINING: CPT

## 2020-12-09 PROCEDURE — 97530 THERAPEUTIC ACTIVITIES: CPT

## 2020-12-09 PROCEDURE — 97110 THERAPEUTIC EXERCISES: CPT

## 2020-12-09 RX ORDER — HYDROCODONE BITARTRATE AND ACETAMINOPHEN 500; 5 MG/1; MG/1
TABLET ORAL
Status: DISCONTINUED | OUTPATIENT
Start: 2020-12-09 | End: 2020-12-11 | Stop reason: HOSPADM

## 2020-12-09 RX ADMIN — DOCUSATE SODIUM 50MG AND SENNOSIDES 8.6MG 1 TABLET: 8.6; 5 TABLET, FILM COATED ORAL at 08:12

## 2020-12-09 RX ADMIN — CLOPIDOGREL 75 MG: 75 TABLET, FILM COATED ORAL at 09:12

## 2020-12-09 RX ADMIN — DOCUSATE SODIUM 50MG AND SENNOSIDES 8.6MG 1 TABLET: 8.6; 5 TABLET, FILM COATED ORAL at 09:12

## 2020-12-09 RX ADMIN — ATORVASTATIN CALCIUM 80 MG: 20 TABLET, FILM COATED ORAL at 08:12

## 2020-12-09 RX ADMIN — POLYETHYLENE GLYCOL 3350 17 G: 17 POWDER, FOR SOLUTION ORAL at 09:12

## 2020-12-09 RX ADMIN — CIPROFLOXACIN 500 MG: 500 TABLET, FILM COATED ORAL at 08:12

## 2020-12-09 RX ADMIN — MELATONIN TAB 3 MG 6 MG: 3 TAB at 08:12

## 2020-12-09 RX ADMIN — METOPROLOL TARTRATE 12.5 MG: 25 TABLET, FILM COATED ORAL at 09:12

## 2020-12-09 RX ADMIN — CIPROFLOXACIN 500 MG: 500 TABLET, FILM COATED ORAL at 09:12

## 2020-12-09 RX ADMIN — LISINOPRIL 10 MG: 10 TABLET ORAL at 09:12

## 2020-12-09 RX ADMIN — FERROUS SULFATE TAB EC 325 MG (65 MG FE EQUIVALENT) 325 MG: 325 (65 FE) TABLET DELAYED RESPONSE at 09:12

## 2020-12-09 RX ADMIN — LEVOTHYROXINE SODIUM 50 MCG: 50 TABLET ORAL at 06:12

## 2020-12-09 NOTE — PLAN OF CARE
Problem: Fall Injury Risk  Goal: Absence of Fall and Fall-Related Injury  Outcome: Ongoing, Progressing  Intervention: Identify and Manage Contributors to Fall Injury Risk  Flowsheets (Taken 12/9/2020 0401)  Self-Care Promotion:   BADL personal objects within reach   safe use of adaptive equipment encouraged     Problem: Skin Injury Risk Increased  Goal: Skin Health and Integrity  Outcome: Ongoing, Progressing  Intervention: Optimize Skin Protection  Flowsheets (Taken 12/9/2020 0401)  Pressure Reduction Techniques:   weight shift assistance provided   frequent weight shift encouraged  Skin Protection:   adhesive use limited   incontinence pads utilized   tubing/devices free from skin contact  Head of Bed (HOB): HOB elevated     Plan of care reviewed with patient. Patient refused iv overnight 3x. Safety maintained, call light in reach, bed in lowest position, will continue to monitor.

## 2020-12-09 NOTE — PROGRESS NOTES
"[facility] Hospital Medicine  Progress Note    Patient Name: Ciara Lozano  MRN: [id number]  Patient Class: IP- Inpatient   Admission Date: 11/27/2020  Length of Stay: 11 days  Attending Physician: Mami Gaitan MD  Primary Care Provider: Andreina Gonzales MD    [facility] Medicine Team: [facility]OSP MED K Mami Gaitan MD    Subjective:     Principal Problem:Bacteremia due to Klebsiella pneumoniae        HPI:  Ms. Ciara Lozano is a 85 y.o. female with CKD 3, chronic combined systolic and diastolic heart failure, HTN, CAD, memory issues, anemia of chronic disease, who was in her usual state of health which is unclear what that is but seems deconditioned. She cannot give much hx due to encephalopathy from sepsis/uti and likely mild uremia also from her JOHNNY on CKD3. She cant say what brought her here, doesn't answer a lot of questions. Says "you ask the same ones that the other guys did". Says its 2020, [facility], and [facility]. Reports pain to her legs but no falls. Does not answer when asked about hydration or urine symptoms the last few days.  In ER retaining urine requiring straight cath and UA consistent with UTI. Johnny on admit, given small bolus in the ER.  TSH low found in the ER, CXR stable. FeNa compatible with post obstructive JOHNNY, low threshold cervantes if retains further, U/S kidneys ordered.  Trop mildly up, chronic issue with this (similar in earlier 2020), will trend.  Denies chest pain.       Overview/Hospital Course:  Patient admitted with sepsis and encephalopathy felt related to UTI. Ultrasound done of kidneys and showed mild bilateral hydronephrosis and felt to be a chronic process. Cervantes placed and patient noted to be retaining urine and Cervantes relieved hydronephrosis and urinary retention. Patient had JOHNNY also on admit with Cr 3.0 and felt related to urinary retention/sepsis and improved with Cervantes and IVF's and Cr normalized back to baseline levels in hospital. U/A " positive for infection and blood and urine cultures done and sent ot lab. Patient started on IV Rocephin to treat. Blood cultures returned positive for Proteus mirabilis and Klebsiella pneumoniae. Urine culture positive for > 100,000 organisms with Klebsiella and Providencia. Sepsis improved and resolved with antibiotics. Sensitivities reviewed and patient de-escalated to Cipro alone on 12/1 to treat bacteremia and UTI. Bacteremia related to UTI as source and UTI felt related to chronic hydronephrosis and urinary retention. Patient to be discharged with Morelos in place and outpatient Urology follow-up. PT/OT consulted in hospital and recommending SNF on discharge and referrals sent and patient denied by [facility] so more referrals sent on 12/7. Encephalopathy much improved from admit but mental state does wax and wane throughout day time and family reports patient has been doing this for months and most of day she likes to sleep and also observed in hospital. Patient medically ready for hospital discharge and awaiting SNF placement.     Interval History: Patient remain medically stable. Patient awaiting SNF placement. Patient with no signs of bleeding and has chronic anemia. Patient has been on oral iron with little improvement in Hgb so will give one time dose of IV Venofer for anemia. Patient has no indication to do a blood transfusion at this time.     Review of Systems   Unable to perform ROS: Dementia     Objective:     Vital Signs (Most Recent):  Temp: 98 °F (36.7 °C) (12/08/20 1910)  Pulse: 102 (12/08/20 1910)  Resp: 18 (12/08/20 1910)  BP: 124/60 (12/08/20 1910)  SpO2: 95 % (12/08/20 1910) on room air Vital Signs (24h Range):  Temp:  [96.8 °F (36 °C)-98.4 °F (36.9 °C)] 98 °F (36.7 °C)  Pulse:  [] 102  Resp:  [18-28] 18  SpO2:  [95 %-98 %] 95 %  BP: (102-141)/(57-81) 124/60     Weight: 73.1 kg (161 lb 2.5 oz)  Body mass index is 29.48 kg/m².    Intake/Output Summary (Last 24 hours)  at 12/8/2020 1939  Last data filed at 12/8/2020 1800  Gross per 24 hour   Intake 360 ml   Output 1170 ml   Net -810 ml      Physical Exam  Vitals signs and nursing note reviewed.   Constitutional:       General: She is not in acute distress.     Appearance: Normal appearance. She is obese. She is not ill-appearing.   Eyes:      Conjunctiva/sclera: Conjunctivae normal.   Neck:      Vascular: No JVD.   Cardiovascular:      Rate and Rhythm: Normal rate and regular rhythm.      Heart sounds: Normal heart sounds. No murmur. No friction rub. No gallop.    Pulmonary:      Effort: Pulmonary effort is normal. No respiratory distress.      Breath sounds: Normal breath sounds. No wheezing.   Abdominal:      General: Abdomen is flat. Bowel sounds are normal. There is no distension.      Palpations: Abdomen is soft.      Tenderness: There is no abdominal tenderness.   Skin:     General: Skin is warm.      Findings: No erythema.   Neurological:      Mental Status: She is alert.      Comments: Oriented to person but not to place or time.    Psychiatric:         Mood and Affect: Mood normal.         Behavior: Behavior is cooperative.         Significant Labs:   CBC:   Recent Labs   Lab 12/08/20  1229   WBC 7.08   HGB 7.0*   HCT 23.4*          Significant Imaging: I have reviewed all pertinent imaging results/findings within the past 24 hours.      Assessment/Plan:      * Bacteremia due to Klebsiella pneumoniae and Proteus Mirabilis  Klebsiella urinary tract infection  · Sepsis resolved. Infection under control. WBC normal. Patient on oral Cipro to treat UTI/bacteremia with end date of 12/12/2020.   · Present on admit. Patient with positive blood culture with both Klebsiella pneumoniae and Proteus mirabilis. Urine culture also positive for Klebsiella and Providencia. Bacteria felt related to urinary source. Patient had hydronephrosis and urinary retention on ultrasound on admit and likely reason patient developed infection  subsequently.  · Repeat blood cultures on 11/28 were final no growth.  · Patient treated initially with broad spectrum antibiotics but when sensitivities returned on all organisms antibiotic switched to Cipro alone as all organisms sensitive and plan is to treat for a total of 14 days from last negative blood culture on 11/28 with end date of 12/12/2020.     Urinary retention  Other hydronephrosis  Patient with JOHNNY on admit and US showed hydronephrosis and Morelos placed and patient hydrated and JOHNNY resolved so likely obstructive uropathy related to urinary retention of unknown cause lead to JOHNNY. Plan to continue Morelos and will continue Morelos on discharge and patient to follow-up with Urology as outpatient.     Chronic combined systolic and diastolic heart failure  Chronic and controlled. Lisinopril held on admit due to JOHNNY and JOHNNY now revolved so plan to restart Lisinopril. Patient has not need for diuretics at this time as euvolemic and not on diuretics at home. Patient on Metoprolol for chronic heart failure and will continue.       Anemia of chronic disease  · Present on admission. Patient with known chronic anemia related to anemia of chronic disease. Hgb 9.9 on admit on 11/27. Hgb since admit has been fluctuating between 7.1-8.5 after she was hydrated so initial Hgb likely was hemoconcentrated. Patient started on oral Ferrous sulfate 325 mg po daily to treat anemia and will continue. Minimize any unnecessary blood draws.  · Will give dose of Venofer 200 mg IV x 1 dose on 12/8 to help anemia as patient not improving on oral iron alone. Patient has no indication for blood transfusion and stable with no active bleeding.         Essential hypertension  BP meds held on admit due to hypotension and JOHNNY and concern for sepsis and volume depletion. Patient restarted on low dose Metoprolol and BP stable. Plan to resume home Lisinopril 10 mg po daily on 12/8. Target BP < 140/90.       Coronary artery disease involving  native coronary artery of native heart without angina pectoris  Chronic and controlled. Patient asymptomatic.Continue Plavix and Metoprolol and Lipitor to treat.       Memory deficits  Patient with waxing and waning mental state in hospital and known memory deficits. Patient appears to be at cognitive baseline according to family,. Continue delirium precautions.       Physical deconditioning  Present on admit. PT/OT consulted and recommending SNF placement on discharge and CM/SW working with family on SNF placement for hospital discharge. Patient medically ready for hospital discharge.      Acquired hypothyroidism  Chronic and controlled. Continue Levothyroxine 50 mcg po daily to treat.         VTE Risk Mitigation (From admission, onward)         Ordered     IP VTE HIGH RISK PATIENT  Once      11/27/20 1952     Place sequential compression device  Until discontinued      11/27/20 1952     Place ZORAIDA hose  Until discontinued      11/27/20 1535     Place sequential compression device  Until discontinued      11/27/20 1535                Discharge Planning   ADRYAN: 12/9/2020     Code Status: Full Code   Is the patient medically ready for discharge?: Yes    Reason for patient still in hospital (select all that apply): Pending disposition  Discharge Plan A: Skilled Nursing Facility   Discharge Delays: (!) Post-Acute Set-up        Mami Gaitan MD  Department of Hospital Medicine   [facility]

## 2020-12-09 NOTE — SUBJECTIVE & OBJECTIVE
Interval History: Patient remain medically stable. Patient awaiting SNF placement. Patient with no signs of bleeding and has chronic anemia. Patient has been on oral iron with little improvement in Hgb so will give one time dose of IV Venofer for anemia. Patient has no indication to do a blood transfusion at this time.     Review of Systems   Unable to perform ROS: Dementia     Objective:     Vital Signs (Most Recent):  Temp: 98 °F (36.7 °C) (12/08/20 1910)  Pulse: 102 (12/08/20 1910)  Resp: 18 (12/08/20 1910)  BP: 124/60 (12/08/20 1910)  SpO2: 95 % (12/08/20 1910) on room air Vital Signs (24h Range):  Temp:  [96.8 °F (36 °C)-98.4 °F (36.9 °C)] 98 °F (36.7 °C)  Pulse:  [] 102  Resp:  [18-28] 18  SpO2:  [95 %-98 %] 95 %  BP: (102-141)/(57-81) 124/60     Weight: 73.1 kg (161 lb 2.5 oz)  Body mass index is 29.48 kg/m².    Intake/Output Summary (Last 24 hours) at 12/8/2020 1939  Last data filed at 12/8/2020 1800  Gross per 24 hour   Intake 360 ml   Output 1170 ml   Net -810 ml      Physical Exam  Vitals signs and nursing note reviewed.   Constitutional:       General: She is not in acute distress.     Appearance: Normal appearance. She is obese. She is not ill-appearing.   Eyes:      Conjunctiva/sclera: Conjunctivae normal.   Neck:      Vascular: No JVD.   Cardiovascular:      Rate and Rhythm: Normal rate and regular rhythm.      Heart sounds: Normal heart sounds. No murmur. No friction rub. No gallop.    Pulmonary:      Effort: Pulmonary effort is normal. No respiratory distress.      Breath sounds: Normal breath sounds. No wheezing.   Abdominal:      General: Abdomen is flat. Bowel sounds are normal. There is no distension.      Palpations: Abdomen is soft.      Tenderness: There is no abdominal tenderness.   Skin:     General: Skin is warm.      Findings: No erythema.   Neurological:      Mental Status: She is alert.      Comments: Oriented to person but not to place or time.    Psychiatric:         Mood and  Affect: Mood normal.         Behavior: Behavior is cooperative.         Significant Labs:   CBC:   Recent Labs   Lab 12/08/20  1229   WBC 7.08   HGB 7.0*   HCT 23.4*          Significant Imaging: I have reviewed all pertinent imaging results/findings within the past 24 hours.

## 2020-12-09 NOTE — PLAN OF CARE
Problem: Physical Therapy Goal  Goal: Physical Therapy Goal  Description: Goals to be met by: 2020     Patient will increase functional independence with mobility by performin. Supine to sit with Moderate Assistance  2. Sit to supine with Moderate Assistance -- MET   Updated: Sit to supine with stand by assistance   3. Sit to stand transfer with Moderate Assistance  4. Bed to chair transfer with Moderate Assistance using LRAD  5. Gait x 10 feet with modA and appropriate AD  6. Pt will perform BLE therex x 15 reps with supervision to improve functional strength      Outcome: Ongoing, Progressing   Goals reviewed and remain appropriate. Will continue with PT POC  Lauren Manley PT  2020

## 2020-12-09 NOTE — PLAN OF CARE
MADDIE updated by MD that pt will require a blood transfusion today therefore not medically ready to transfer to [facility]. CM left a msg for [facility]/ [facility] and updated in [facility] - pt expected to be ready tmw.    MADDIE NEWELL [id number] - covering 8WT MADDIE Torres [id number]     12/09/20 1055   Discharge Reassessment   Assessment Type Discharge Planning Reassessment   Discharge Plan A Skilled Nursing Facility   Discharge Plan B Home with family;Home Health   Anticipated Discharge Disposition SNF   Post-Acute Status   Post-Acute Authorization Placement   Post-Acute Placement Status Awaiting Internal Medical Clearance  (accepted by [facility])   Discharge Delays (!) Change in Medical Condition  (needs blood transfusion today)

## 2020-12-09 NOTE — PT/OT/SLP PROGRESS
"Occupational Therapy   Treatment    Name: Ciara Lozano  MRN: [id number]  Admitting Diagnosis:  Bacteremia due to Klebsiella pneumoniae        Co-treat with PT due to pt's decreased activity tolerance and her requiring 2 skilled therapists to safely perform functional mobility     Recommendations:     Discharge Recommendations: nursing facility, skilled  Discharge Equipment Recommendations:  other (see comments)(TBD)  Barriers to discharge:  Other (Comment)(increased level of skilled assistance required at her current level of function)    Assessment:     Ciara Lozano is a 85 y.o. female with a medical diagnosis of Bacteremia due to Klebsiella pneumoniae.  Pt continues to require increased encouragement to participate in therapeutic activity.  She performed sit to stand transfer from EOB x 2 trials with Mod A.  Pt had a bowel movement in bed and required Max A for peroneal care while standing.  She presents with the following deficits. Performance deficits affecting function are weakness, impaired endurance, impaired self care skills, impaired functional mobilty, impaired balance, gait instability, impaired cognition, decreased lower extremity function, decreased safety awareness.     Rehab Prognosis:  Fair; patient would benefit from acute skilled OT services to address these deficits and reach maximum level of function.       Plan:     Patient to be seen 3 x/week to address the above listed problems via self-care/home management, therapeutic exercises, therapeutic activities  · Plan of Care Expires: 12/29/20  · Plan of Care Reviewed with: patient    Subjective   "Hurry up."  Pain/Comfort:  · Pain Rating 1: 0/10  · Pain Rating Post-Intervention 1: 0/10    Objective:     Communicated with: RN and PT prior to session.  Patient found HOB elevated with bed alarm, cervantes catheter, peripheral IV(Visi monitor, IV inactive) upon OT entry to room.    General Precautions: Standard, fall, aspiration   Orthopedic Precautions:N/A "   Braces: N/A     Occupational Performance:     Bed Mobility:    · Patient completed Scooting/Bridging to HOB via draw sheet while supine with maximal assistance and 2 persons  Pt completed Rolling L and R with Mod A.   · Patient completed Supine to Sit with moderate assistance for BLE and trunk management  · Patient completed Sit to Supine with minimum assistance for BLE management  · Pt required Mod A to reposition in bed.    Functional Mobility/Transfers:  · Patient completed Sit <> Stand Transfer from EOB x 2 trials with moderate assistance  with  hand-held assist.  Pt stood x ~30 sec each trial.   · Functional Mobility: Deferred    Activities of Daily Living:  · Upper Body Dressing: maximal assistance to doff soiled gown and to jesse clean gown/manage lines while supine with HOB elevated  · Toileting: maximal assistance to perform perineal care/bed pad change while pt stood from EOB with PT.  Pt had a BM.       [facility] 6 Click ADL: 12    Treatment & Education:  - Pt edu on role of OT, POC, safety when performing self care tasks, benefit of performing OOB activity, and safety when performing functional transfers and mobility.    - Self care tasks completed-- as noted above       Patient left HOB elevated with all lines intact, call button in reach and bed alarm onEducation:      GOALS:   Multidisciplinary Problems     Occupational Therapy Goals        Problem: Occupational Therapy Goal    Goal Priority Disciplines Outcome Interventions   Occupational Therapy Goal     OT, PT/OT Ongoing, Progressing    Description: Goals to be met by: 12/13/20     Patient will increase functional independence with ADLs by performing:    Feeding with Set-up Assistance.  UE Dressing with Stand-by Assistance.  LE Dressing with Moderate Assistance.  Grooming while standing with Minimal Assistance.  Toileting from bedside commode with Moderate Assistance for hygiene and clothing management.   Toilet transfer to bedside commode with  Minimal Assistance.                     Time Tracking:     OT Date of Treatment: 12/09/20  OT Start Time: 0942  OT Stop Time: 1001  OT Total Time (min): 19 min    Billable Minutes:Self Care/Home Management 19 min.    Bienvenido Albarran, OT  12/9/2020

## 2020-12-09 NOTE — PLAN OF CARE
12/09/20 1459   Post-Acute Status   Post-Acute Authorization Placement   Post-Acute Placement Status Pending Service Contract       SW received note from [facility] that daughter did not come to scheduled appt to complete facility paperwork. Facility to initiate contact with daughter to complete paperwork. Facility stated that pt can come in morning if she is medically stable.     SW will con't to follow.    Lakshmi Cleaning LMSW  Case Management Social Worker   [facility]

## 2020-12-09 NOTE — ASSESSMENT & PLAN NOTE
Klebsiella urinary tract infection  · Sepsis resolved. Infection under control. WBC normal. Patient on oral Cipro to treat UTI/bacteremia with end date of 12/12/2020.   · Present on admit. Patient with positive blood culture with both Klebsiella pneumoniae and Proteus mirabilis. Urine culture also positive for Klebsiella and Providencia. Bacteria felt related to urinary source. Patient had hydronephrosis and urinary retention on ultrasound on admit and likely reason patient developed infection subsequently.  · Repeat blood cultures on 11/28 were final no growth.  · Patient treated initially with broad spectrum antibiotics but when sensitivities returned on all organisms antibiotic switched to Cipro alone as all organisms sensitive and plan is to treat for a total of 14 days from last negative blood culture on 11/28 with end date of 12/12/2020.

## 2020-12-09 NOTE — ASSESSMENT & PLAN NOTE
· Present on admission. Patient with known chronic anemia related to anemia of chronic disease. Hgb 9.9 on admit on 11/27. Hgb since admit has been fluctuating between 7.1-8.5 after she was hydrated so initial Hgb likely was hemoconcentrated. Patient started on oral Ferrous sulfate 325 mg po daily to treat anemia and will continue. Minimize any unnecessary blood draws.  · Will give dose of Venofer 200 mg IV x 1 dose on 12/8 to help anemia as patient not improving on oral iron alone. Patient has no indication for blood transfusion and stable with no active bleeding.

## 2020-12-09 NOTE — PLAN OF CARE
Problem: Occupational Therapy Goal  Goal: Occupational Therapy Goal  Description: Goals to be met by: 12/13/20     Patient will increase functional independence with ADLs by performing:    Feeding with Set-up Assistance.  UE Dressing with Stand-by Assistance.  LE Dressing with Moderate Assistance.  Grooming while standing with Minimal Assistance.  Toileting from bedside commode with Moderate Assistance for hygiene and clothing management.   Toilet transfer to bedside commode with Minimal Assistance.    Outcome: Ongoing, Progressing     OT goals remain appropriate.    Bienvenido Albarran, OT   12/09/2020

## 2020-12-09 NOTE — PT/OT/SLP PROGRESS
"Physical Therapy Treatment    Patient Name:  Ciara Lozano   MRN:  [id number]    Recommendations:     Discharge Recommendations:  nursing facility, skilled   Discharge Equipment Recommendations: other (see comments)(TBD)   Barriers to discharge: Decreased caregiver support    Co-treat with OT due to pt's activity tolerance and her requiring 2 skilled therapists to safely perform functional mobility.    Assessment:     Ciara Lozano is a 85 y.o. female admitted with a medical diagnosis of Bacteremia due to Klebsiella pneumoniae.  She presents with the following impairments/functional limitations:  weakness, impaired functional mobilty, impaired cardiopulmonary response to activity, impaired endurance, impaired self care skills, decreased lower extremity function, impaired balance . Pt required encouragement to participate in therapy session. She performed sit<>stand transfer EOB X 2 trials with Mod A and bed mobility with Mod A.    Rehab Prognosis: Fair; patient would benefit from acute skilled PT services to address these deficits and reach maximum level of function.    Recent Surgery: * No surgery found *      Plan:     During this hospitalization, patient to be seen 3 x/week to address the identified rehab impairments via gait training, therapeutic activities, therapeutic exercises, neuromuscular re-education and progress toward the following goals:    · Plan of Care Expires:  12/29/20    Subjective     Chief Complaint: no c/o pain  Patient/Family Comments/goals: "hurry up"  Pain/Comfort:  · Pain Rating 1: 0/10  · Pain Rating Post-Intervention 1: 0/10      Objective:     Communicated with RN prior to session.  Patient found with wedge with bed alarm, peripheral IV, cervantes catheter, pulse ox (continuous), telemetry(VISI monitor; PIV inactive) upon PT entry to room.     General Precautions: Standard, fall   Orthopedic Precautions:N/A   Braces: N/A     Functional Mobility:  · Bed Mobility:     · Rolling Left:  moderate " assistance  · Rolling Right: moderate assistance  · Scooting: maximal assistance and of 2 persons to scoot to HOB in supine using draw sheet  · Supine to Sit: moderate assistance for BLEs and trunk  · Sit to Supine: minimum assistance for BLEs  · Mod assist to reposition body in supine  · Transfers:     · Sit to Stand: Mod-Max A from EOB X 2 trial with Mod a for static standing with HHA for ~30 sec each trial      AM-PAC 6 CLICK MOBILITY  Turning over in bed (including adjusting bedclothes, sheets and blankets)?: 2  Sitting down on and standing up from a chair with arms (e.g., wheelchair, bedside commode, etc.): 2  Moving from lying on back to sitting on the side of the bed?: 2  Moving to and from a bed to a chair (including a wheelchair)?: 2  Need to walk in hospital room?: 1  Climbing 3-5 steps with a railing?: 1  Basic Mobility Total Score: 10         Patient left HOB elevated with L wedge with all lines intact, call button in reach and bed alarm on..    GOALS:   Multidisciplinary Problems     Physical Therapy Goals        Problem: Physical Therapy Goal    Goal Priority Disciplines Outcome Goal Variances Interventions   Physical Therapy Goal     PT, PT/OT Ongoing, Progressing     Description: Goals to be met by: 2020     Patient will increase functional independence with mobility by performin. Supine to sit with Moderate Assistance  2. Sit to supine with Moderate Assistance -- MET   Updated: Sit to supine with stand by assistance   3. Sit to stand transfer with Moderate Assistance  4. Bed to chair transfer with Moderate Assistance using LRAD  5. Gait x 10 feet with modA and appropriate AD  6. Pt will perform BLE therex x 15 reps with supervision to improve functional strength                       Time Tracking:     PT Received On: 20  PT Start Time: 938     PT Stop Time: 1004  PT Total Time (min): 26 min     Billable Minutes: Therapeutic Activity 18 and Therapeutic Exercise 8    Treatment  Type: Treatment  PT/PTA: PT     PTA Visit Number: 0     Lauren Manley, PT  12/09/2020

## 2020-12-09 NOTE — PLAN OF CARE
Patient given one unit of PRBC today.  Patient has been cooperative with staff, and uncooperative with staff at different times during this shift.  Will continue to monitor.

## 2020-12-09 NOTE — NURSING
Pt iv infiltrated and taken out refused new IV 3x. On call for med team3 MD. Nathalie Rodriguez notified.

## 2020-12-10 LAB
BASOPHILS # BLD AUTO: 0.05 K/UL (ref 0–0.2)
BASOPHILS NFR BLD: 0.8 % (ref 0–1.9)
DIFFERENTIAL METHOD: ABNORMAL
EOSINOPHIL # BLD AUTO: 0.1 K/UL (ref 0–0.5)
EOSINOPHIL NFR BLD: 1 % (ref 0–8)
ERYTHROCYTE [DISTWIDTH] IN BLOOD BY AUTOMATED COUNT: 20.8 % (ref 11.5–14.5)
HCT VFR BLD AUTO: 27.3 % (ref 37–48.5)
HGB BLD-MCNC: 8.3 G/DL (ref 12–16)
IMM GRANULOCYTES # BLD AUTO: 0.06 K/UL (ref 0–0.04)
IMM GRANULOCYTES NFR BLD AUTO: 1 % (ref 0–0.5)
LYMPHOCYTES # BLD AUTO: 1.5 K/UL (ref 1–4.8)
LYMPHOCYTES NFR BLD: 23.3 % (ref 18–48)
MCH RBC QN AUTO: 26.4 PG (ref 27–31)
MCHC RBC AUTO-ENTMCNC: 30.4 G/DL (ref 32–36)
MCV RBC AUTO: 87 FL (ref 82–98)
MONOCYTES # BLD AUTO: 0.6 K/UL (ref 0.3–1)
MONOCYTES NFR BLD: 9.9 % (ref 4–15)
NEUTROPHILS # BLD AUTO: 4 K/UL (ref 1.8–7.7)
NEUTROPHILS NFR BLD: 64 % (ref 38–73)
NRBC BLD-RTO: 0 /100 WBC
PLATELET # BLD AUTO: 181 K/UL (ref 150–350)
PMV BLD AUTO: 11.4 FL (ref 9.2–12.9)
RBC # BLD AUTO: 3.14 M/UL (ref 4–5.4)
WBC # BLD AUTO: 6.27 K/UL (ref 3.9–12.7)

## 2020-12-10 PROCEDURE — 99231 SBSQ HOSP IP/OBS SF/LOW 25: CPT | Mod: ,,, | Performed by: HOSPITALIST

## 2020-12-10 PROCEDURE — U0003 INFECTIOUS AGENT DETECTION BY NUCLEIC ACID (DNA OR RNA); SEVERE ACUTE RESPIRATORY SYNDROME CORONAVIRUS 2 (SARS-COV-2) (CORONAVIRUS DISEASE [COVID-19]), AMPLIFIED PROBE TECHNIQUE, MAKING USE OF HIGH THROUGHPUT TECHNOLOGIES AS DESCRIBED BY CMS-2020-01-R: HCPCS

## 2020-12-10 PROCEDURE — 36415 COLL VENOUS BLD VENIPUNCTURE: CPT

## 2020-12-10 PROCEDURE — 25000003 PHARM REV CODE 250: Performed by: HOSPITALIST

## 2020-12-10 PROCEDURE — 20600001 HC STEP DOWN PRIVATE ROOM

## 2020-12-10 PROCEDURE — 99231 PR SUBSEQUENT HOSPITAL CARE,LEVL I: ICD-10-PCS | Mod: ,,, | Performed by: HOSPITALIST

## 2020-12-10 PROCEDURE — 85025 COMPLETE CBC W/AUTO DIFF WBC: CPT

## 2020-12-10 PROCEDURE — 25000003 PHARM REV CODE 250: Performed by: INTERNAL MEDICINE

## 2020-12-10 RX ADMIN — ATORVASTATIN CALCIUM 80 MG: 20 TABLET, FILM COATED ORAL at 09:12

## 2020-12-10 RX ADMIN — DOCUSATE SODIUM 50MG AND SENNOSIDES 8.6MG 1 TABLET: 8.6; 5 TABLET, FILM COATED ORAL at 09:12

## 2020-12-10 RX ADMIN — CLOPIDOGREL 75 MG: 75 TABLET, FILM COATED ORAL at 09:12

## 2020-12-10 RX ADMIN — CIPROFLOXACIN 500 MG: 500 TABLET, FILM COATED ORAL at 09:12

## 2020-12-10 RX ADMIN — METOPROLOL TARTRATE 12.5 MG: 25 TABLET, FILM COATED ORAL at 09:12

## 2020-12-10 RX ADMIN — FERROUS SULFATE TAB EC 325 MG (65 MG FE EQUIVALENT) 325 MG: 325 (65 FE) TABLET DELAYED RESPONSE at 09:12

## 2020-12-10 RX ADMIN — LISINOPRIL 10 MG: 10 TABLET ORAL at 09:12

## 2020-12-10 RX ADMIN — POLYETHYLENE GLYCOL 3350 17 G: 17 POWDER, FOR SOLUTION ORAL at 09:12

## 2020-12-10 NOTE — ASSESSMENT & PLAN NOTE
BP meds held on admit due to hypotension and JOHNNY and concern for sepsis and volume depletion. Patient restarted on low dose Metoprolol and Lisinopril and BP stable. Plan to resume home Lisinopril 10 mg po daily on 12/8. Target BP < 140/90.

## 2020-12-10 NOTE — PLAN OF CARE
Problem: Fall Injury Risk  Goal: Absence of Fall and Fall-Related Injury  Outcome: Ongoing, Progressing  Intervention: Identify and Manage Contributors to Fall Injury Risk  Flowsheets (Taken 12/10/2020 0423)  Self-Care Promotion: BADL personal objects within reach  Medication Review/Management: medications reviewed  Intervention: Promote Injury-Free Environment  Flowsheets (Taken 12/10/2020 0423)  Safety Promotion/Fall Prevention:   assistive device/personal item within reach   bed alarm set   nonskid shoes/socks when out of bed   side rails raised x 3   Fall Risk reviewed with patient/family  Environmental Safety Modification:   assistive device/personal items within reach   clutter free environment maintained    Plan of care reviewed with patient. No acute changes overnight. Safety maintained, call light in reach, bed in lowest position, will continue to monitor.

## 2020-12-10 NOTE — PLAN OF CARE
12/10/20 1209   Post-Acute Status   Post-Acute Authorization Placement   Post-Acute Placement Status Pending Bed Availability         Pt daughter advised pt will be going to [facility] for SNF services. Updated clinicals provided.    GLADYS attempted contact with admissions rep.     No answer. Left .       GLADYS returned call to [facility]. Admissions rep requested change to diet and more recent COVID.       GLADYS advised MD. Lakshmi Cleaning LMSW  Case Management Social Worker   [facility]

## 2020-12-10 NOTE — ASSESSMENT & PLAN NOTE
· Present on admission. Patient with known chronic anemia related to anemia of chronic disease. Hgb 9.9 on admit on 11/27. Hgb since admit has been fluctuating between 7.1-8.5 after she was hydrated so initial Hgb likely was hemoconcentrated. Patient started on oral Ferrous sulfate 325 mg po daily to treat anemia but Hgb continue to slowly down trend and down to 6.5 on 12/9 so to be transfused 1 unit of PRBCs.

## 2020-12-10 NOTE — SUBJECTIVE & OBJECTIVE
Interval History:     Patient hg is stable. Is ready for dc. She had paperwork ready  2 days ago the CM/SW found out today who are covering previous CM as they were wokring the last 2 days on another SNF as there was not communication it seems.  She doesn't have an updated covid test. ordereed now. Do not use a rapid, use PCR I sent message about this to ensure right test is ordereed.  Patient has no complaints on exm        Review of Systems   Unable to perform ROS: Dementia     Objective:     Vital Signs (Most Recent):  Temp: 97.7 °F (36.5 °C) (12/09/20 1659)  Pulse: 93 (12/09/20 1740)  Resp: 19 (12/09/20 1740)  BP: 140/73 (12/09/20 1740)  SpO2: 99 % (12/09/20 1740) on room air Vital Signs (24h Range):  Temp:  [96.2 °F (35.7 °C)-97.7 °F (36.5 °C)] 97.6 °F (36.4 °C)  Pulse:  [77-95] 77  Resp:  [18-29] 18  SpO2:  [98 %-99 %] 98 %  BP: (123-156)/(61-79) 135/63     Weight: 72.9 kg (160 lb 11.5 oz)  Body mass index is 29.4 kg/m².    Intake/Output Summary (Last 24 hours) at 12/10/2020 1419  Last data filed at 12/10/2020 0500  Gross per 24 hour   Intake 557.92 ml   Output 1000 ml   Net -442.08 ml      Physical Exam  Vitals signs and nursing note reviewed.   Constitutional:       General: She is not in acute distress.     Appearance: Normal appearance. She is obese. She is not ill-appearing.   Eyes:      Conjunctiva/sclera: Conjunctivae normal.   Neck:      Vascular: No JVD.   Cardiovascular:      Rate and Rhythm: Normal rate and regular rhythm.      Heart sounds: Normal heart sounds. No murmur. No friction rub. No gallop.    Pulmonary:      Effort: Pulmonary effort is normal. No respiratory distress.      Breath sounds: Normal breath sounds. No wheezing.   Abdominal:      General: Abdomen is flat. Bowel sounds are normal. There is no distension.      Palpations: Abdomen is soft.      Tenderness: There is no abdominal tenderness.   Skin:     General: Skin is warm.      Findings: No erythema.   Neurological:      Mental  Status: She is alert.      Comments: Oriented to person but not to place or time.    Psychiatric:         Mood and Affect: Mood normal.         Behavior: Behavior is cooperative.         Significant Labs:   CBC:   Recent Labs   Lab 12/09/20  0309 12/10/20  0425   WBC 5.88 6.27   HGB 6.5* 8.3*   HCT 21.7* 27.3*    181       Significant Imaging: I have reviewed all pertinent imaging results/findings within the past 24 hours.

## 2020-12-10 NOTE — PROGRESS NOTES
"[facility] Hospital Medicine  Progress Note    Patient Name: Ciara Lozano  MRN: [id number]  Patient Class: IP- Inpatient   Admission Date: 11/27/2020  Length of Stay: 12 days  Attending Physician: Mami Gaitan MD  Primary Care Provider: Andreina Gonzales MD    [facility] Medicine Team: [facility] HOSP MED K Mami Gaitan MD    Subjective:     Principal Problem:Bacteremia due to Klebsiella pneumoniae        HPI:  Ms. Ciara Lozano is a 85 y.o. female with CKD 3, chronic combined systolic and diastolic heart failure, HTN, CAD, memory issues, anemia of chronic disease, who was in her usual state of health which is unclear what that is but seems deconditioned. She cannot give much hx due to encephalopathy from sepsis/uti and likely mild uremia also from her JOHNNY on CKD3. She cant say what brought her here, doesn't answer a lot of questions. Says "you ask the same ones that the other guys did". Says its 2020, [facility], and [facility]. Reports pain to her legs but no falls. Does not answer when asked about hydration or urine symptoms the last few days.  In ER retaining urine requiring straight cath and UA consistent with UTI. Jonhny on admit, given small bolus in the ER.  TSH low found in the ER, CXR stable. FeNa compatible with post obstructive JOHNNY, low threshold cervantes if retains further, U/S kidneys ordered.  Trop mildly up, chronic issue with this (similar in earlier 2020), will trend.  Denies chest pain.       Overview/Hospital Course:  Patient admitted with sepsis and encephalopathy felt related to UTI. Ultrasound done of kidneys and showed mild bilateral hydronephrosis and felt to be a chronic process. Cervantes placed and patient noted to be retaining urine and Cervantes relieved hydronephrosis and urinary retention. Patient had JOHNNY also on admit with Cr 3.0 and felt related to urinary retention/sepsis and improved with Cervantes and IVF's and Cr normalized back to baseline levels in hospital. U/A " positive for infection and blood and urine cultures done and sent ot lab. Patient started on IV Rocephin to treat. Blood cultures returned positive for Proteus mirabilis and Klebsiella pneumoniae. Urine culture positive for > 100,000 organisms with Klebsiella and Providencia. Sepsis improved and resolved with antibiotics. Sensitivities reviewed and patient de-escalated to Cipro alone on 12/1 to treat bacteremia and UTI. Bacteremia related to UTI as source and UTI felt related to chronic hydronephrosis and urinary retention. Patient to be discharged with Morelos in place and outpatient Urology follow-up. PT/OT consulted in hospital and recommending SNF on discharge and referrals sent and patient denied by [facility] for [facility] so more referrals sent on 12/7. Encephalopathy much improved from admit but mental state does wax and wane throughout day time and family reports patient has been doing this for months and most of day she likes to sleep and also observed in hospital. Patient's cognition improved. Patient with slow decrease in Hgb in hospital and Hgb trended down to 6.5 on 12/9 and necessitating blood transfusion so patient to be transfused 1 unit of PRBCs on 12/9. SW/CM able to find an accepting SNF facility ([facility]) so once medically ready patient has a SNF facility to discharge to.     Interval History: Patient Hgb down from 7.0 on 12/8 to 6.5 on 12/9. Patient has no signs if bleeding and nursing reports no melena or BRBPR. Patient has chronic anemia and Hgb has been 7-8 since admit so likely slow decline related to blood draws in hospital. I discussed blood transfusion with granddaughter, Lynnette Rm on phone and she states her grandmother was transfused 2 units of PRBCs in November. Granddaughter gave verbal consent over phone for blood transfusion so plan to transfuse patient 1 unit of PRBCs today. Patient on oral iron therapy but may not be absorbing to treat anemia.   CM/SW states  they found an accepting SNF facility for patient- [facility] so when medically ready she has facility to discharge to.      Review of Systems   Unable to perform ROS: Dementia     Objective:     Vital Signs (Most Recent):  Temp: 97.7 °F (36.5 °C) (12/09/20 1659)  Pulse: 93 (12/09/20 1740)  Resp: 19 (12/09/20 1740)  BP: 140/73 (12/09/20 1740)  SpO2: 99 % (12/09/20 1740) on room air Vital Signs (24h Range):  Temp:  [97.7 °F (36.5 °C)-98.7 °F (37.1 °C)] 97.7 °F (36.5 °C)  Pulse:  [] 93  Resp:  [16-29] 28  SpO2:  [95 %-99 %] 99 %  BP: (124-141)/(60-73) 140/73     Weight: 73.2 kg (161 lb 6 oz)  Body mass index is 29.52 kg/m².    Intake/Output Summary (Last 24 hours) at 12/9/2020 1834  Last data filed at 12/9/2020 1800  Gross per 24 hour   Intake 340 ml   Output 701 ml   Net -361 ml      Physical Exam  Vitals signs and nursing note reviewed.   Constitutional:       General: She is not in acute distress.     Appearance: Normal appearance. She is obese. She is not ill-appearing.   Eyes:      Conjunctiva/sclera: Conjunctivae normal.   Neck:      Vascular: No JVD.   Cardiovascular:      Rate and Rhythm: Normal rate and regular rhythm.      Heart sounds: Normal heart sounds. No murmur. No friction rub. No gallop.    Pulmonary:      Effort: Pulmonary effort is normal. No respiratory distress.      Breath sounds: Normal breath sounds. No wheezing.   Abdominal:      General: Abdomen is flat. Bowel sounds are normal. There is no distension.      Palpations: Abdomen is soft.      Tenderness: There is no abdominal tenderness.   Skin:     General: Skin is warm.      Findings: No erythema.   Neurological:      Mental Status: She is alert.      Comments: Oriented to person but not to place or time.    Psychiatric:         Mood and Affect: Mood normal.         Behavior: Behavior is cooperative.         Significant Labs:   CBC:   Recent Labs   Lab 12/08/20  1229 12/09/20  0309   WBC 7.08 5.88   HGB 7.0* 6.5*   HCT 23.4* 21.7*     181       Significant Imaging: I have reviewed all pertinent imaging results/findings within the past 24 hours.      Assessment/Plan:      * Bacteremia due to Klebsiella pneumoniae and Proteus Mirabilis  Klebsiella urinary tract infection  · Sepsis resolved. Infection under control. WBC normal. Patient on oral Cipro to treat UTI/bacteremia with end date of 12/12/2020.   · Present on admit. Patient with positive blood culture with both Klebsiella pneumoniae and Proteus mirabilis. Urine culture also positive for Klebsiella and Providencia. Bacteria felt related to urinary source. Patient had hydronephrosis and urinary retention on ultrasound on admit and likely reason patient developed infection subsequently.  · Repeat blood cultures on 11/28 were final no growth.  · Patient treated initially with broad spectrum antibiotics but when sensitivities returned on all organisms antibiotic switched to Cipro alone as all organisms sensitive and plan is to treat for a total of 14 days from last negative blood culture on 11/28 with end date of 12/12/2020.     Urinary retention  Other hydronephrosis  Patient with JOHNNY on admit and US showed hydronephrosis and Morelos placed and patient hydrated and JOHNNY resolved so likely obstructive uropathy related to urinary retention of unknown cause lead to JOHNNY. Plan to continue Morelos and will continue Morelos on discharge and patient to follow-up with Urology as outpatient.     Chronic combined systolic and diastolic heart failure  Chronic and controlled. Lisinopril held on admit due to JOHNNY and JOHNNY now revolved so plan to restart Lisinopril. Patient has not need for diuretics at this time as euvolemic and not on diuretics at home. Patient on Metoprolol for chronic heart failure and will continue.       Anemia of chronic disease  · Present on admission. Patient with known chronic anemia related to anemia of chronic disease. Hgb 9.9 on admit on 11/27. Hgb since admit has been fluctuating  between 7.1-8.5 after she was hydrated so initial Hgb likely was hemoconcentrated. Patient started on oral Ferrous sulfate 325 mg po daily to treat anemia but Hgb continue to slowly down trend and down to 6.5 on 12/9 so to be transfused 1 unit of PRBCs.     Essential hypertension  BP meds held on admit due to hypotension and JOHNNY and concern for sepsis and volume depletion. Patient restarted on low dose Metoprolol and Lisinopril and BP stable. Plan to resume home Lisinopril 10 mg po daily on 12/8. Target BP < 140/90.       Coronary artery disease involving native coronary artery of native heart without angina pectoris  Chronic and controlled. Patient asymptomatic.Continue Plavix and Metoprolol and Lipitor to treat.       Memory deficits  Patient with waxing and waning mental state in hospital and known memory deficits. Patient appears to be at cognitive baseline according to family,. Continue delirium precautions.       Physical deconditioning  Present on admit. PT/OT consulted and recommending SNF placement on discharge and CM/SW working with family and patient accepted to [facility] and when medically ready can discharge to that facility.       Acquired hypothyroidism  Chronic and controlled. Continue Levothyroxine 50 mcg po daily to treat.         VTE Risk Mitigation (From admission, onward)         Ordered     IP VTE HIGH RISK PATIENT  Once      11/27/20 1952     Place sequential compression device  Until discontinued      11/27/20 1952     Place ZORAIDA hose  Until discontinued      11/27/20 1535     Place sequential compression device  Until discontinued      11/27/20 1535                Discharge Planning   ADRYAN: 12/10/2020     Code Status: Full Code   Is the patient medically ready for discharge?: No    Reason for patient still in hospital (select all that apply): Patient trending condition  Discharge Plan A: Skilled Nursing Facility ([facility])  Discharge Delays: (!) Change in Medical Condition(needs blood  transfusion today)          Mami Gaitan MD  Department of Hospital Medicine   [facility]

## 2020-12-10 NOTE — PLAN OF CARE
[facility]     Department of Hospital Medicine     [address] [place] [address]     [phone] after hours  [phone] fax       NURSING HOME ORDERS    12/11/2020    Admit to Nursing Home:     Skilled Bed                                                  Diagnoses:  Active Hospital Problems    Diagnosis  POA    *Bacteremia due to Klebsiella pneumoniae and Proteus Mirabilis [R78.81, B96.1]  Yes    Klebsiella pneumoniae infection [A49.8]  Yes    Other hydronephrosis [N13.39]  Yes    Klebsiella urinary tract infection [N30.00]  Yes    Physical deconditioning [R53.81]  Yes    Acquired hypothyroidism [E03.9]  Yes    Proteinuria [R80.9]  Yes    Anemia of chronic disease [D63.8]  Yes    Urinary retention [R33.9]  Yes    Chronic combined systolic and diastolic heart failure [I50.42]  Yes    Memory deficits [R41.3]  Yes    Coronary artery disease involving native coronary artery of native heart without angina pectoris [I25.10]  Yes    LBBB (left bundle branch block) [I44.7]  Yes    Essential hypertension [I10]  Yes      Resolved Hospital Problems    Diagnosis Date Resolved POA    Chronic retention of urine [R33.9] 12/02/2020 Yes    Acute metabolic encephalopathy [G93.41] 12/02/2020 Yes    JOHNNY (acute kidney injury) [N17.9] 12/02/2020 Yes    Acute renal failure superimposed on stage 3a chronic kidney disease [N17.9, N18.31] 12/03/2020 Yes    Septic encephalopathy [G93.41] 12/06/2020 Yes    Sepsis [A41.9] 12/08/2020 Unknown       Patient is homebound due to:  Bacteremia due to Klebsiella pneumoniae    Allergies:  Review of patient's allergies indicates:   Allergen Reactions    Pollen extracts        Vitals:       Every shift (Skilled Nursing patients)    Diet: NO ADDED SALT DIET            Acitivities:      - Up in a chair each morning as tolerated   - Ambulate with assistance to bathroom   - Scheduled walks once each shift (every 8 hours)   - May  ambulate independently   - May use walker, cane, or self-propelled wheelchair   - Weight bearing: as tolerated     LABS:  Per facility protocol     CMP, CBC weekly    Nursing Precautions:     - Aspiration precautions:             - Total assistance with meals            -  Upright 90 degrees befor during and after meals             -  Suction at bedside          - Fall precautions per nursing home protocol   - Seizure precaution per retirement protocol   - Decubitus precautions:        -  for positioning   - Pressure reducing foam mattress   - Turn patient every two hours. Use wedge pillows to anchor patient    CONSULTS:      Physical Therapy to evaluate and treat     Occupational Therapy to evaluate and treat     Speech Therapy  to evaluate and treat     Nutrition to evaluate and recommend diet     Psychiatry to evaluate and follow patients for delirium    MISCELLANEOUS CARE:                  Morelos Care: Empty Morelos bag every shift.  Change Morelos every month     Routine Skin for Bedridden Patients:  Apply moisture barrier cream to all    skin folds and wet areas in perineal area daily and after baths and                           all bowel movements.                              Medication Information                      acetaminophen (TYLENOL) 325 MG tablet  Take 2 tablets (650 mg total) by mouth every 6 (six) hours as needed (Mild pain or temperature > 100.4 F).             aspirin (ECOTRIN) 81 MG EC tablet  Take 81 mg by mouth once daily.             atorvastatin (LIPITOR) 80 MG tablet  Take 1 tablet (80 mg total) by mouth every evening.             ciprofloxacin HCl (CIPRO) 500 MG tablet  Take 1 tablet (500 mg total) by mouth every 12 (twelve) hours. End date 12/12/2020.              clopidogreL (PLAVIX) 75 mg tablet  Take 75 mg by mouth once daily.             ferrous sulfate 325 (65 FE) MG EC tablet  Take 1 tablet (325 mg total) by mouth once daily.             levothyroxine (SYNTHROID) 50 MCG  tablet  Take 1 tablet (50 mcg total) by mouth before breakfast.             lisinopriL 10 MG tablet  Take 1 tablet (10 mg total) by mouth once daily.             melatonin (MELATIN) 3 mg tablet  Take 2 tablets (6 mg total) by mouth nightly as needed for Insomnia.             metoprolol tartrate (LOPRESSOR) 25 MG tablet  Take 0.5 tablets (12.5 mg total) by mouth 2 (two) times daily.             nitroGLYCERIN (NITROSTAT) 0.4 MG SL tablet  Place 1 tablet (0.4 mg total) under the tongue every 5 (five) minutes as needed for Chest pain (no more tiana 3 doses).             senna-docusate 8.6-50 mg (PERICOLACE) 8.6-50 mg per tablet  Take 1 tablet by mouth 2 (two) times daily.                       _________________________________  Annel Sanchez MD  12/11/2020

## 2020-12-10 NOTE — SUBJECTIVE & OBJECTIVE
Interval History: Patient Hgb down from 7.0 on 12/8 to 6.5 on 12/9. Patient has no signs if bleeding and nursing reports no melena or BRBPR. Patient has chronic anemia and Hgb has been 7-8 since admit so likely slow decline related to blood draws in hospital. I discussed blood transfusion with granddaughter, Lynnette Rm on phone and she states her grandmother was transfused 2 units of PRBCs in November. Granddaughter gave verbal consent over phone for blood transfusion so plan to transfuse patient 1 unit of PRBCs today. Patient on oral iron therapy but may not be absorbing to treat anemia.   CM/SW states they found an accepting SNF facility for patient- [facility] so when medically ready she has facility to discharge to.      Review of Systems   Unable to perform ROS: Dementia     Objective:     Vital Signs (Most Recent):  Temp: 97.7 °F (36.5 °C) (12/09/20 1659)  Pulse: 93 (12/09/20 1740)  Resp: 19 (12/09/20 1740)  BP: 140/73 (12/09/20 1740)  SpO2: 99 % (12/09/20 1740) on room air Vital Signs (24h Range):  Temp:  [97.7 °F (36.5 °C)-98.7 °F (37.1 °C)] 97.7 °F (36.5 °C)  Pulse:  [] 93  Resp:  [16-29] 28  SpO2:  [95 %-99 %] 99 %  BP: (124-141)/(60-73) 140/73     Weight: 73.2 kg (161 lb 6 oz)  Body mass index is 29.52 kg/m².    Intake/Output Summary (Last 24 hours) at 12/9/2020 1834  Last data filed at 12/9/2020 1800  Gross per 24 hour   Intake 340 ml   Output 701 ml   Net -361 ml      Physical Exam  Vitals signs and nursing note reviewed.   Constitutional:       General: She is not in acute distress.     Appearance: Normal appearance. She is obese. She is not ill-appearing.   Eyes:      Conjunctiva/sclera: Conjunctivae normal.   Neck:      Vascular: No JVD.   Cardiovascular:      Rate and Rhythm: Normal rate and regular rhythm.      Heart sounds: Normal heart sounds. No murmur. No friction rub. No gallop.    Pulmonary:      Effort: Pulmonary effort is normal. No respiratory distress.      Breath sounds:  Normal breath sounds. No wheezing.   Abdominal:      General: Abdomen is flat. Bowel sounds are normal. There is no distension.      Palpations: Abdomen is soft.      Tenderness: There is no abdominal tenderness.   Skin:     General: Skin is warm.      Findings: No erythema.   Neurological:      Mental Status: She is alert.      Comments: Oriented to person but not to place or time.    Psychiatric:         Mood and Affect: Mood normal.         Behavior: Behavior is cooperative.         Significant Labs:   CBC:   Recent Labs   Lab 12/08/20  1229 12/09/20  0309   WBC 7.08 5.88   HGB 7.0* 6.5*   HCT 23.4* 21.7*    181       Significant Imaging: I have reviewed all pertinent imaging results/findings within the past 24 hours.

## 2020-12-10 NOTE — ASSESSMENT & PLAN NOTE
· Present on admission. Patient with known chronic anemia related to anemia of chronic disease. Hgb 9.9 on admit on 11/27. Hgb since admit has been fluctuating between 7.1-8.5 after she was hydrated so initial Hgb likely was hemoconcentrated. Patient started on oral Ferrous sulfate 325 mg po daily to treat anemia but Hgb continue to slowly down trend and down to 6.5 on 12/9 so to be transfused 1 unit of PRBCs.   · 12/10 stable and improved

## 2020-12-11 VITALS
OXYGEN SATURATION: 95 % | SYSTOLIC BLOOD PRESSURE: 146 MMHG | WEIGHT: 164.25 LBS | BODY MASS INDEX: 30.22 KG/M2 | TEMPERATURE: 98 F | RESPIRATION RATE: 18 BRPM | DIASTOLIC BLOOD PRESSURE: 65 MMHG | HEART RATE: 91 BPM | HEIGHT: 62 IN

## 2020-12-11 LAB — SARS-COV-2 RNA RESP QL NAA+PROBE: NOT DETECTED

## 2020-12-11 PROCEDURE — 25000003 PHARM REV CODE 250: Performed by: HOSPITALIST

## 2020-12-11 PROCEDURE — 97530 THERAPEUTIC ACTIVITIES: CPT

## 2020-12-11 PROCEDURE — 97535 SELF CARE MNGMENT TRAINING: CPT

## 2020-12-11 PROCEDURE — 99239 PR HOSPITAL DISCHARGE DAY,>30 MIN: ICD-10-PCS | Mod: ,,, | Performed by: HOSPITALIST

## 2020-12-11 PROCEDURE — 25000003 PHARM REV CODE 250: Performed by: INTERNAL MEDICINE

## 2020-12-11 PROCEDURE — 99239 HOSP IP/OBS DSCHRG MGMT >30: CPT | Mod: ,,, | Performed by: HOSPITALIST

## 2020-12-11 RX ORDER — LORAZEPAM 1 MG/1
1 TABLET ORAL ONCE AS NEEDED
Status: COMPLETED | OUTPATIENT
Start: 2020-12-11 | End: 2020-12-11

## 2020-12-11 RX ADMIN — POLYETHYLENE GLYCOL 3350 17 G: 17 POWDER, FOR SOLUTION ORAL at 10:12

## 2020-12-11 RX ADMIN — LORAZEPAM 1 MG: 1 TABLET ORAL at 06:12

## 2020-12-11 RX ADMIN — DOCUSATE SODIUM 50MG AND SENNOSIDES 8.6MG 1 TABLET: 8.6; 5 TABLET, FILM COATED ORAL at 10:12

## 2020-12-11 RX ADMIN — CLOPIDOGREL 75 MG: 75 TABLET, FILM COATED ORAL at 10:12

## 2020-12-11 RX ADMIN — CIPROFLOXACIN 500 MG: 500 TABLET, FILM COATED ORAL at 10:12

## 2020-12-11 RX ADMIN — LISINOPRIL 10 MG: 10 TABLET ORAL at 10:12

## 2020-12-11 RX ADMIN — FERROUS SULFATE TAB EC 325 MG (65 MG FE EQUIVALENT) 325 MG: 325 (65 FE) TABLET DELAYED RESPONSE at 10:12

## 2020-12-11 RX ADMIN — METOPROLOL TARTRATE 12.5 MG: 25 TABLET, FILM COATED ORAL at 10:12

## 2020-12-11 RX ADMIN — LEVOTHYROXINE SODIUM 50 MCG: 50 TABLET ORAL at 10:12

## 2020-12-11 NOTE — PLAN OF CARE
Future Appointments   Date Time Provider Department Center   1/4/2021 11:00 AM Andreina Gonzales MD [facility]             12/11/20 160   Final Note   Assessment Type Final Discharge Note   Anticipated Discharge Disposition SNF   What phone number can be called within the next 1-3 days to see how you are doing after discharge? [phone]   Hospital Follow Up  Appt(s) scheduled?   (Ambulatory referral sent to Cardiology and Urology.)   Right Care Referral Info   Post Acute Recommendation SNF / Sub-Acute Rehab   Facility Name [facility]   Post-Acute Status   Post-Acute Authorization Placement   Post-Acute Placement Status Set-up Complete

## 2020-12-11 NOTE — PLAN OF CARE
Problem: Physical Therapy Goal  Goal: Physical Therapy Goal  Description: Goals to be met by: 2020     Patient will increase functional independence with mobility by performin. Supine to sit with Moderate Assistance  2. Sit to supine with Moderate Assistance -- MET   Updated: Sit to supine with stand by assistance   3. Sit to stand transfer with Moderate Assistance  4. Bed to chair transfer with Moderate Assistance using LRAD  5. Gait x 10 feet with modA and appropriate AD  6. Pt will perform BLE therex x 15 reps with supervision to improve functional strength      Outcome: Ongoing, Progressing

## 2020-12-11 NOTE — PLAN OF CARE
12/11/20 1501   Post-Acute Status   Post-Acute Authorization Placement   Post-Acute Placement Status Set-up Complete       SW set transport through [facility] via stretcher for 3 PM. *This is an estimated time.     Pt to d/c to [address] [address] Rm 402.     Pt family notified via telephone. Nurse call report to .    No further needs.       Lakshmi Cleaning LMSW  Case Management Social Worker   [facility] [address]

## 2020-12-11 NOTE — PT/OT/SLP PROGRESS
"Physical Therapy Treatment    Patient Name:  Ciara Lozano   MRN:  [id number]    Co-treat with OT performed for this visit due to pt's decresed activity tolerance and her requiring 2 skilled therapists to safely perform functional mobility.  Recommendations:     Discharge Recommendations:  nursing facility, skilled   Discharge Equipment Recommendations: bedside commode, walker, rolling   Barriers to discharge: Decreased caregiver support    Assessment:     Ciara oLzano is a 85 y.o. female admitted with a medical diagnosis of Bacteremia due to Klebsiella pneumoniae.  She presents with the following impairments/functional limitations:  weakness, impaired functional mobilty, impaired cognition, impaired endurance, gait instability, impaired balance, decreased upper extremity function, impaired self care skills, decreased lower extremity function. Pt was more alert and participation in therapy session improved today. She Required Mod A of 2 for sit<>stand with RW 2 trials and HHA for 3rd trial; mod-max A  To maintain standing 20 sec, 30 sec and 5 sec with RW for cleaning after BM. RN notified and of possible blood in stool but she determined it was food.    Rehab Prognosis: Fair; patient would benefit from acute skilled PT services to address these deficits and reach maximum level of function.    Recent Surgery: * No surgery found *      Plan:     During this hospitalization, patient to be seen 3 x/week to address the identified rehab impairments via gait training, therapeutic activities, therapeutic exercises, neuromuscular re-education and progress toward the following goals:    · Plan of Care Expires:  12/29/20    Subjective     Chief Complaint: headache  Patient/Family Comments/goals: "why are y'all waking me up?"  Pain/Comfort:  · Pain Rating 1: 0/10  · Pain Rating Post-Intervention 1: 0/10      Objective:     Communicated with RN and OT prior to session.  Patient found HOB elevated with bed alarm, cervantes catheter, " telemetry, pulse ox (continuous)(VISI monitor) upon PT entry to room.     General Precautions: Standard, fall, aspiration   Orthopedic Precautions:N/A   Braces: N/A     Functional Mobility:  · Bed Mobility:   · Rolling L: Moderate assistance     · Scooting: maximal assistance and of 2 persons to scoot up to HOB in supine using drawsheet  · Supine to Sit: maximal assistance and of 2 persons  · Sit to Supine: maximal assistance and of 2 persons  · Transfers:     · Sit to Stand:  moderate assistance and of 2 persons with rolling walker X 2 trials and 3rd trial with HHA  · Balance: mod-max A  to maintain standing 20 sec, 30 sec with RW  And 5 sec with HHA for cleaning after BM.      AM-PAC 6 CLICK MOBILITY  Turning over in bed (including adjusting bedclothes, sheets and blankets)?: 2  Sitting down on and standing up from a chair with arms (e.g., wheelchair, bedside commode, etc.): 2  Moving from lying on back to sitting on the side of the bed?: 2  Moving to and from a bed to a chair (including a wheelchair)?: 2  Need to walk in hospital room?: 1  Climbing 3-5 steps with a railing?: 1  Basic Mobility Total Score: 10       Therapeutic Activities and Exercises:   - bed mobility training  -transfer training  -pre-gait standing activity    Patient left HOB elevated with all lines intact, call button in reach and RN notified..    GOALS:   Multidisciplinary Problems     Physical Therapy Goals        Problem: Physical Therapy Goal    Goal Priority Disciplines Outcome Goal Variances Interventions   Physical Therapy Goal     PT, PT/OT Ongoing, Progressing     Description: Goals to be met by: 2020     Patient will increase functional independence with mobility by performin. Supine to sit with Moderate Assistance  2. Sit to supine with Moderate Assistance -- MET   Updated: Sit to supine with stand by assistance   3. Sit to stand transfer with Moderate Assistance  4. Bed to chair transfer with Moderate Assistance using  LRAD  5. Gait x 10 feet with modA and appropriate AD  6. Pt will perform BLE therex x 15 reps with supervision to improve functional strength                       Time Tracking:     PT Received On: 12/11/20  PT Start Time: 1408     PT Stop Time: 1429  PT Total Time (min): 21 min     Billable Minutes: Therapeutic Activity 21    Treatment Type: Treatment  PT/PTA: PT     PTA Visit Number: 0     Lauren Manley, PT  12/11/2020

## 2020-12-11 NOTE — NURSING
Report given to LATOSHA Salinas at [facility]. Pt ready to for DC at this time. PIV removed. Morelos in place. All belongings gathered. Awaiting transport. Will continue to monitor.

## 2020-12-11 NOTE — PLAN OF CARE
Problem: Fall Injury Risk  Goal: Absence of Fall and Fall-Related Injury  12/10/2020 1955 by Melina Salas RN  Outcome: Ongoing, Progressing  12/10/2020 1916 by Melina Salas RN  Outcome: Ongoing, Progressing  Intervention: Promote Injury-Free Environment  12/10/2020 1955 by Melina Salas RN  Flowsheets (Taken 12/10/2020 1955)  Safety Promotion/Fall Prevention:   assistive device/personal item within reach   bed alarm set   side rails raised x 2   Fall Risk reviewed with patient/family   Fall Risk signage in place  Environmental Safety Modification:   assistive device/personal items within reach   clutter free environment maintained   lighting adjusted   room organization consistent  12/10/2020 1916 by Melina Salas RN  Flowsheets (Taken 12/10/2020 1916)  Safety Promotion/Fall Prevention: bed alarm set  Environmental Safety Modification:   assistive device/personal items within reach   clutter free environment maintained   lighting adjusted   room organization consistent     Problem: Fluid Imbalance (Acute Kidney Injury/Impairment)  Goal: Optimal Fluid Balance  Outcome: Ongoing, Progressing  Intervention: Monitor and Manage Fluid Balance  Flowsheets (Taken 12/10/2020 1955)  Fluid/Electrolyte Management: fluids provided     Problem: Skin Injury Risk Increased  Goal: Skin Health and Integrity  Outcome: Ongoing, Progressing  Intervention: Promote and Optimize Oral Intake  Flowsheets (Taken 12/10/2020 1955)  Oral Nutrition Promotion:   calorie dense foods provided   safe use of adaptive equipment encouraged     Pt AO with memory issues. LARRY Morelos in place. No changes today. Covid screen completed today for placement. Will continue to monitor.

## 2020-12-12 NOTE — PT/OT/SLP PROGRESS
"Occupational Therapy   Treatment    Name: Ciara Lozano  MRN: [id number]  Admitting Diagnosis:  Bacteremia due to Klebsiella pneumoniae        Co-treat with PT due to pt's limited activity tolerance and requiring 2 skilled therapists to safely perform functional mobility    Recommendations:     Discharge Recommendations: nursing facility, skilled  Discharge Equipment Recommendations:  other (see comments)(TBD)  Barriers to discharge:  Other (Comment)(increased level of skilled assistance required at her current level of function)    Assessment:     Ciara Lozano is a 85 y.o. female with a medical diagnosis of Bacteremia due to Klebsiella pneumoniae.   Pt with improved participation this date.  While standing EOB, therapist noted stool on the pt's bed pad with possibility of blood.  RN notified and came in to check, stating the color appeared to be food-related and not blood.  She presents with the following deficits. Performance deficits affecting function are weakness, impaired endurance, impaired self care skills, impaired functional mobilty, gait instability, impaired cognition, impaired balance, decreased lower extremity function, decreased upper extremity function.     Rehab Prognosis:  Fair; patient would benefit from acute skilled OT services to address these deficits and reach maximum level of function.       Plan:     Patient to be seen 3 x/week to address the above listed problems via self-care/home management, therapeutic activities, therapeutic exercises  · Plan of Care Expires: 12/29/20  · Plan of Care Reviewed with: patient    Subjective   "Why did you wake me up?"  Pain/Comfort:  Pain Rating 1: other (see comments)(Pt mentioned she had a headache but did not rate (wasn't significant).)    Objective:     Communicated with: RN and PT prior to session.  Patient found asleep with HOB elevated with bed alarm, cervantes catheter(Visi monitor) upon OT entry to room.    General Precautions: Standard, aspiration, " fall   Orthopedic Precautions:N/A   Braces: N/A     Occupational Performance:     Bed Mobility:    · Patient completed Rolling/Turning to Left with  moderate assistance  · Patient completed Scooting/Bridging to HOB while supine via draw sheet with maximal assistance and 2 persons  · Patient completed Supine to Sit with maximal assistance and 2 persons  · Patient completed Sit to Supine with maximal assistance and 2 persons     Functional Mobility/Transfers:  · Patient completed Sit <> Stand Transfer from EOB x 3 trials with moderate assistance and of 2 persons  with  rolling walker for 1st two trials and with HHA for 3rd trial.  Pt stood x ~20 sec, ~30 sec, and ~5 sec, respectively.  Pt was fatigued.  · Functional Mobility: Deferred due to pt having BM and needing to be cleaned.    · Pt's sitting balance was CGA-SBA.    Activities of Daily Living:  · Toileting: maximal assistance to perform perineal care and change bed pads while standing EOB      [facility] 6 Click ADL: 12    Treatment & Education:  - Pt edu on role of OT, POC, benefit of performing EOB/OOB activity, and safety when performing functional transfers and bed mobility.    - Self care tasks completed-- as noted above       Patient left HOB elevated with all lines intact, call button in reach and bed alarm onEducation:      GOALS:   Multidisciplinary Problems     Occupational Therapy Goals        Problem: Occupational Therapy Goal    Goal Priority Disciplines Outcome Interventions   Occupational Therapy Goal     OT, PT/OT Ongoing, Progressing    Description: Goals to be met by: 12/13/20     Patient will increase functional independence with ADLs by performing:    Feeding with Set-up Assistance.  UE Dressing with Stand-by Assistance.  LE Dressing with Moderate Assistance.  Grooming while standing with Minimal Assistance.  Toileting from bedside commode with Moderate Assistance for hygiene and clothing management.   Toilet transfer to bedside commode with  Minimal Assistance.                     Time Tracking:     OT Date of Treatment: 12/11/20  OT Start Time: 1410  OT Stop Time: 1426  OT Total Time (min): 16 min    Billable Minutes:Self Care/Home Management 16 min.    Bienvenido Albarran, OT  12/11/2020

## 2020-12-12 NOTE — DISCHARGE SUMMARY
DISCHARGE SUMMARY  Hospital Medicine    Team: [facility] HOSP MED K    Patient Name: Ciara Lozano  YOB: 1935    Admit Date: 11/27/2020    Discharge Date: 12/11/20    Discharge Attending Physician: Annel Sanchez MD    Principal Diagnoses:  Active Hospital Problems    Diagnosis  POA    *Bacteremia due to Klebsiella pneumoniae and Proteus Mirabilis [R78.81, B96.1]  Yes    Klebsiella pneumoniae infection [A49.8]  Yes    Other hydronephrosis [N13.39]  Yes    Klebsiella urinary tract infection [N30.00]  Yes    Physical deconditioning [R53.81]  Yes    Acquired hypothyroidism [E03.9]  Yes    Proteinuria [R80.9]  Yes    Anemia of chronic disease [D63.8]  Yes    Urinary retention [R33.9]  Yes    Chronic combined systolic and diastolic heart failure [I50.42]  Yes    Memory deficits [R41.3]  Yes    Coronary artery disease involving native coronary artery of native heart without angina pectoris [I25.10]  Yes    LBBB (left bundle branch block) [I44.7]  Yes    Essential hypertension [I10]  Yes      Resolved Hospital Problems    Diagnosis Date Resolved POA    Chronic retention of urine [R33.9] 12/02/2020 Yes    Acute metabolic encephalopathy [G93.41] 12/02/2020 Yes    JOHNNY (acute kidney injury) [N17.9] 12/02/2020 Yes    Acute renal failure superimposed on stage 3a chronic kidney disease [N17.9, N18.31] 12/03/2020 Yes    Septic encephalopathy [G93.41] 12/06/2020 Yes    Sepsis [A41.9] 12/08/2020 Unknown       Discharged Condition: stable     Interval history: patient at baseline now, voluntarily choses not to talk a lot of times. No issues for awhile, waited on placement. To [facility] today. Has uro and cards referrals on dc, chronic urine retention and cervantes on dc. Complete pyelo and bacteremia treatment tomorrow.    Temp:  [96.2 °F (35.7 °C)-98.2 °F (36.8 °C)]   Pulse:  [75-95]   Resp:  [16-31]   BP: (123-166)/(61-79)   SpO2:  [95 %-99 %]      Physical Exam  Vitals signs and nursing note reviewed.  "  Constitutional:       General: She is not in acute distress.     Appearance: Normal appearance. She is obese. She is not ill-appearing.   Eyes:      Conjunctiva/sclera: Conjunctivae normal.   Neck:      Vascular: No JVD.   Cardiovascular:      Rate and Rhythm: Normal rate and regular rhythm.      Heart sounds: Normal heart sounds. No murmur. No friction rub. No gallop.    Pulmonary:      Effort: Pulmonary effort is normal. No respiratory distress.      Breath sounds: Normal breath sounds. No wheezing.   Abdominal:      General: Abdomen is flat. Bowel sounds are normal. There is no distension.      Palpations: Abdomen is soft.      Tenderness: There is no abdominal tenderness.   Skin:     General: Skin is warm.      Findings: No erythema.   Neurological:      Mental Status: She is alert.      Comments: Oriented to person but not to place or time.    Psychiatric:         Mood and Affect: Mood normal.         Behavior: Behavior is cooperative.     HOSPITAL COURSE:      Initial Presentation:    Ms. Ciara Lozano is a 85 y.o. female with past medical history of CKD 3, chronic combined HF, HTN, CAD, memory issues, anemia of chronic dx, who was in her usual state of health which is unclear what that is but seems deconditioned. She cannot give much hx due to encephalopathy from sepsis/uti and likely mild uremia also from her TERESA on CKD3. She cant say what brought her here, doesn't answer a lot of questions. Says "you ask the same ones that the other guys did". Says its 2020, [place], [facility], and a hospital. Reports pain to her legs but no falls. Does not answer when asked about hydration or urine symptoms the last few days.  In ER retaining urine requiring straight cath and UA consistent with UTI. Teresa on admit, given small bolus in the ER.  TSH low found in the ER, CXR stable. FeNa compatible with post obstructive TERESA, low threshold cervantes if retains further, U/S kidneys ordered.  Trop mildly up, chronic issue with " this (similar in earlier 2020), will trend.  Denies chest pain.   Other histoyr unable to obtain    Course of Principle Problem for Admission:    Bacteremia due to Klebsiella pneumoniae and Proteus Mirabilis  Klebsiella urinary tract infection  · Sepsis resolved. Infection under control. WBC normal. Patient on oral Cipro to treat UTI/bacteremia with end date of 12/12/2020.   · Present on admit. Patient with positive blood culture with both Klebsiella pneumoniae and Proteus mirabilis. Urine culture also positive for Klebsiella and Providencia. Bacteria felt related to urinary source. Patient had hydronephrosis and urinary retention on ultrasound on admit and likely reason patient developed infection subsequently.  · Repeat blood cultures on 11/28 were final no growth.  · Patient treated initially with broad spectrum antibiotics but when sensitivities returned on all organisms antibiotic switched to Cipro alone as all organisms sensitive and plan is to treat for a total of 14 days from last negative blood culture on 11/28 with end date of 12/12/2020.     Other Medical Problems Addressed in the Hospital:    Urinary retention  Other hydronephrosis  Patient with JOHNNY on admit and US showed hydronephrosis and Morelos placed and patient hydrated and JOHNNY resolved so likely obstructive uropathy related to urinary retention of unknown cause lead to JOHNNY. Plan to continue Morelos and will continue Morelos on discharge and patient to follow-up with Urology as outpatient.      Anemia of chronic disease  · Present on admission. Patient with known chronic anemia related to anemia of chronic disease. Hgb 9.9 on admit on 11/27. Hgb since admit has been fluctuating between 7.1-8.5 after she was hydrated so initial Hgb likely was hemoconcentrated. Patient started on oral Ferrous sulfate 325 mg po daily to treat anemia but Hgb continue to slowly down trend and down to 6.5 on 12/9 so to be transfused 1 unit of PRBCs.   · 12/10 stable and  improved     Acquired hypothyroidism  Chronic and controlled. Continue Levothyroxine 50 mcg po daily to treat.         Physical deconditioning  Present on admit. PT/OT consulted and recommending SNF placement on discharge and CM/SW working with family and patient accepted to SNF on dc for restoration of ADLS        Memory deficits  Patient with waxing and waning mental state in hospital and known memory deficits. Patient appears to be at cognitive baseline according to family,. Voluntarily choses not to talk a lot of times, this is baseline.        Chronic combined systolic and diastolic heart failure  Chronic and controlled. Lisinopril held on admit due to JOHNNY and JOHNNY now revolved so plan to restart Lisinopril. Patient has not need for diuretics at this time as euvolemic and not on diuretics at home. Patient on Metoprolol for chronic heart failure and will continue.         Coronary artery disease involving native coronary artery of native heart without angina pectoris  Chronic and controlled. Patient asymptomatic.Continue Plavix and Metoprolol and Lipitor to treat.         Essential hypertension  BP meds held on admit due to hypotension and JOHNNY and concern for sepsis and volume depletion. Patient restarted on low dose Metoprolol and Lisinopril and BP stable. Plan to resume home Lisinopril 10 mg po daily on 12/8. Target BP < 140/90.           Last CBC/BMP:    CBC/Anemia Labs: Coags:    Recent Labs   Lab 12/08/20  1229 12/09/20  0309 12/10/20  0425   WBC 7.08 5.88 6.27   HGB 7.0* 6.5* 8.3*   HCT 23.4* 21.7* 27.3*    181 181   MCV 84 85 87   RDW 21.6* 21.9* 20.8*    No results for input(s): PT, INR, APTT in the last 168 hours.     Chemistries:   Recent Labs   Lab 12/06/20  0711 12/07/20  0440    138   K 4.1 3.9    109   CO2 23 21*   BUN 21 20   CREATININE 0.7 0.8   CALCIUM 8.1* 8.3*   PROT 5.3* 5.8*   BILITOT 0.7 0.8   ALKPHOS 77 83   ALT 16 16   AST 22 23              Special  Treatments/Procedures:   * No surgery found *     Disposition: Skilled Nursing Facility      Future Scheduled Appointments:  Future Appointments   Date Time Provider Department Center   1/4/2021 11:00 AM Andreina Gonzales MD [facility]         Discharge Medication List:       Ciara Lozano   Home Medication Instructions TONEY:[id number]    Printed on:12/12/20 6990   Medication Information                      acetaminophen (TYLENOL) 325 MG tablet  Take 2 tablets (650 mg total) by mouth every 6 (six) hours as needed (Mild pain or temperature > 100.4 F).             aspirin (ECOTRIN) 81 MG EC tablet  Take 81 mg by mouth once daily.             atorvastatin (LIPITOR) 80 MG tablet  Take 1 tablet (80 mg total) by mouth every evening.             ciprofloxacin HCl (CIPRO) 500 MG tablet  Take 1 tablet (500 mg total) by mouth every 12 (twelve) hours. End date 12/12/2020. for 5 days             clopidogreL (PLAVIX) 75 mg tablet  Take 75 mg by mouth once daily.             ferrous sulfate 325 (65 FE) MG EC tablet  Take 1 tablet (325 mg total) by mouth once daily.             levothyroxine (SYNTHROID) 50 MCG tablet  Take 1 tablet (50 mcg total) by mouth before breakfast.             lisinopriL 10 MG tablet  Take 1 tablet (10 mg total) by mouth once daily.             melatonin (MELATIN) 3 mg tablet  Take 2 tablets (6 mg total) by mouth nightly as needed for Insomnia.             metoprolol tartrate (LOPRESSOR) 25 MG tablet  Take 0.5 tablets (12.5 mg total) by mouth 2 (two) times daily.             nitroGLYCERIN (NITROSTAT) 0.4 MG SL tablet  Place 1 tablet (0.4 mg total) under the tongue every 5 (five) minutes as needed for Chest pain (no more tiana 3 doses).             senna-docusate 8.6-50 mg (PERICOLACE) 8.6-50 mg per tablet  Take 1 tablet by mouth 2 (two) times daily.                 Patient Instructions:  Discharge Procedure Orders   Ambulatory referral/consult to Urology   Standing Status: Future   Referral  Priority: Routine Referral Type: Consultation   Referral Reason: Specialty Services Required   Requested Specialty: Urology   Number of Visits Requested: 1     Ambulatory referral/consult to Cardiology   Standing Status: Future   Referral Priority: Routine Referral Type: Consultation   Referral Reason: Specialty Services Required   Requested Specialty: Cardiology   Number of Visits Requested: 1     Activity as tolerated       At the time of discharge patient was told to take all medications as prescribed, to keep all followup appointments, and to call their primary care physician or return to the emergency room if they have any worsening or concerning symptoms.    Signing Physician:  Annel Sanchez MD

## 2020-12-12 NOTE — PLAN OF CARE
Problem: Occupational Therapy Goal  Goal: Occupational Therapy Goal  Description: Goals to be met by: 12/13/20     Patient will increase functional independence with ADLs by performing:    Feeding with Set-up Assistance.  UE Dressing with Stand-by Assistance.  LE Dressing with Moderate Assistance.  Grooming while standing with Minimal Assistance.  Toileting from bedside commode with Moderate Assistance for hygiene and clothing management.   Toilet transfer to bedside commode with Minimal Assistance.    Outcome: Ongoing, Progressing    OT goals remain appropriate.    Bienvenido Albarran, OT   12/11/2020

## 2021-01-21 ENCOUNTER — HOSPITAL ENCOUNTER (INPATIENT)
Facility: HOSPITAL | Age: 86
LOS: 11 days | Discharge: HOME HEALTH | DRG: 871 | End: 2021-02-03
Attending: EMERGENCY MEDICINE | Admitting: INTERNAL MEDICINE
Payer: MEDICARE

## 2021-01-21 DIAGNOSIS — A04.72 C. DIFFICILE COLITIS: ICD-10-CM

## 2021-01-21 DIAGNOSIS — R10.9 ABDOMINAL PAIN: ICD-10-CM

## 2021-01-21 DIAGNOSIS — R00.0 TACHYCARDIA: ICD-10-CM

## 2021-01-21 DIAGNOSIS — E16.2 HYPOGLYCEMIA: ICD-10-CM

## 2021-01-21 DIAGNOSIS — I47.20 V TACH: ICD-10-CM

## 2021-01-21 DIAGNOSIS — K51.00 PANCOLITIS: Primary | ICD-10-CM

## 2021-01-21 PROBLEM — G93.40 ACUTE ENCEPHALOPATHY: Status: ACTIVE | Noted: 2021-01-21

## 2021-01-21 PROBLEM — R78.81 BACTEREMIA DUE TO KLEBSIELLA PNEUMONIAE: Status: RESOLVED | Noted: 2020-12-01 | Resolved: 2021-01-21

## 2021-01-21 PROBLEM — B96.1 BACTEREMIA DUE TO KLEBSIELLA PNEUMONIAE: Status: RESOLVED | Noted: 2020-12-01 | Resolved: 2021-01-21

## 2021-01-21 PROBLEM — N30.00 ACUTE CYSTITIS WITHOUT HEMATURIA: Status: RESOLVED | Noted: 2020-11-27 | Resolved: 2021-01-21

## 2021-01-21 PROBLEM — R53.81 PHYSICAL DECONDITIONING: Status: RESOLVED | Noted: 2020-11-27 | Resolved: 2021-01-21

## 2021-01-21 LAB
ALBUMIN SERPL BCP-MCNC: 2.4 G/DL (ref 3.5–5.2)
ALP SERPL-CCNC: 92 U/L (ref 55–135)
ALT SERPL W/O P-5'-P-CCNC: 11 U/L (ref 10–44)
AMORPH CRY UR QL COMP ASSIST: ABNORMAL
ANION GAP SERPL CALC-SCNC: 8 MMOL/L (ref 8–16)
AST SERPL-CCNC: 21 U/L (ref 10–40)
BACTERIA #/AREA URNS AUTO: ABNORMAL /HPF
BASOPHILS # BLD AUTO: 0.02 K/UL (ref 0–0.2)
BASOPHILS NFR BLD: 0.2 % (ref 0–1.9)
BILIRUB SERPL-MCNC: 0.8 MG/DL (ref 0.1–1)
BILIRUB UR QL STRIP: NEGATIVE
BUN SERPL-MCNC: 13 MG/DL (ref 6–30)
BUN SERPL-MCNC: 22 MG/DL (ref 8–23)
C DIFF GDH STL QL: POSITIVE
C DIFF TOX A+B STL QL IA: POSITIVE
CALCIUM SERPL-MCNC: 8.3 MG/DL (ref 8.7–10.5)
CHLORIDE SERPL-SCNC: 117 MMOL/L (ref 95–110)
CHLORIDE SERPL-SCNC: 124 MMOL/L (ref 95–110)
CLARITY UR REFRACT.AUTO: ABNORMAL
CO2 SERPL-SCNC: 18 MMOL/L (ref 23–29)
COLOR UR AUTO: YELLOW
CREAT SERPL-MCNC: 0.4 MG/DL (ref 0.5–1.4)
CREAT SERPL-MCNC: 0.9 MG/DL (ref 0.5–1.4)
CTP QC/QA: YES
DIFFERENTIAL METHOD: ABNORMAL
EOSINOPHIL # BLD AUTO: 0.1 K/UL (ref 0–0.5)
EOSINOPHIL NFR BLD: 0.4 % (ref 0–8)
ERYTHROCYTE [DISTWIDTH] IN BLOOD BY AUTOMATED COUNT: 20.6 % (ref 11.5–14.5)
EST. GFR  (AFRICAN AMERICAN): >60 ML/MIN/1.73 M^2
EST. GFR  (NON AFRICAN AMERICAN): 58.9 ML/MIN/1.73 M^2
GLUCOSE SERPL-MCNC: 45 MG/DL (ref 70–110)
GLUCOSE SERPL-MCNC: 67 MG/DL (ref 70–110)
GLUCOSE UR QL STRIP: NEGATIVE
HCT VFR BLD AUTO: 39.2 % (ref 37–48.5)
HCT VFR BLD CALC: 20 %PCV (ref 36–54)
HGB BLD-MCNC: 11.2 G/DL (ref 12–16)
HGB UR QL STRIP: ABNORMAL
IMM GRANULOCYTES # BLD AUTO: 0.05 K/UL (ref 0–0.04)
IMM GRANULOCYTES NFR BLD AUTO: 0.4 % (ref 0–0.5)
KETONES UR QL STRIP: NEGATIVE
LACTATE SERPL-SCNC: 1.2 MMOL/L (ref 0.5–2.2)
LEUKOCYTE ESTERASE UR QL STRIP: ABNORMAL
LIPASE SERPL-CCNC: 5 U/L (ref 4–60)
LYMPHOCYTES # BLD AUTO: 1 K/UL (ref 1–4.8)
LYMPHOCYTES NFR BLD: 8 % (ref 18–48)
MCH RBC QN AUTO: 28.5 PG (ref 27–31)
MCHC RBC AUTO-ENTMCNC: 28.6 G/DL (ref 32–36)
MCV RBC AUTO: 100 FL (ref 82–98)
MICROSCOPIC COMMENT: ABNORMAL
MONOCYTES # BLD AUTO: 0.8 K/UL (ref 0.3–1)
MONOCYTES NFR BLD: 5.9 % (ref 4–15)
NEUTROPHILS # BLD AUTO: 10.9 K/UL (ref 1.8–7.7)
NEUTROPHILS NFR BLD: 85.1 % (ref 38–73)
NITRITE UR QL STRIP: NEGATIVE
NRBC BLD-RTO: 0 /100 WBC
PH UR STRIP: 5 [PH] (ref 5–8)
PLATELET # BLD AUTO: 202 K/UL (ref 150–350)
PMV BLD AUTO: 11.6 FL (ref 9.2–12.9)
POC IONIZED CALCIUM: 0.88 MMOL/L (ref 1.06–1.42)
POC TCO2 (MEASURED): 15 MMOL/L (ref 23–29)
POCT GLUCOSE: 71 MG/DL (ref 70–110)
POCT GLUCOSE: 88 MG/DL (ref 70–110)
POCT GLUCOSE: 91 MG/DL (ref 70–110)
POTASSIUM BLD-SCNC: 2.4 MMOL/L (ref 3.5–5.1)
POTASSIUM SERPL-SCNC: 4.9 MMOL/L (ref 3.5–5.1)
PROT SERPL-MCNC: 6 G/DL (ref 6–8.4)
PROT UR QL STRIP: NEGATIVE
RBC # BLD AUTO: 3.93 M/UL (ref 4–5.4)
RBC #/AREA URNS AUTO: 4 /HPF (ref 0–4)
SAMPLE: ABNORMAL
SARS-COV-2 RDRP RESP QL NAA+PROBE: NEGATIVE
SODIUM BLD-SCNC: 150 MMOL/L (ref 136–145)
SODIUM SERPL-SCNC: 143 MMOL/L (ref 136–145)
SP GR UR STRIP: 1.01 (ref 1–1.03)
SQUAMOUS #/AREA URNS AUTO: 0 /HPF
URN SPEC COLLECT METH UR: ABNORMAL
WBC # BLD AUTO: 12.83 K/UL (ref 3.9–12.7)
WBC #/AREA STL HPF: ABNORMAL /[HPF]
WBC #/AREA URNS AUTO: 8 /HPF (ref 0–5)

## 2021-01-21 PROCEDURE — 99285 EMERGENCY DEPT VISIT HI MDM: CPT | Mod: ,,, | Performed by: EMERGENCY MEDICINE

## 2021-01-21 PROCEDURE — 87046 STOOL CULTR AEROBIC BACT EA: CPT

## 2021-01-21 PROCEDURE — 81001 URINALYSIS AUTO W/SCOPE: CPT

## 2021-01-21 PROCEDURE — 99220 PR INITIAL OBSERVATION CARE,LEVL III: ICD-10-PCS | Mod: ,,, | Performed by: HOSPITALIST

## 2021-01-21 PROCEDURE — 89055 LEUKOCYTE ASSESSMENT FECAL: CPT

## 2021-01-21 PROCEDURE — 96376 TX/PRO/DX INJ SAME DRUG ADON: CPT | Performed by: EMERGENCY MEDICINE

## 2021-01-21 PROCEDURE — 80053 COMPREHEN METABOLIC PANEL: CPT

## 2021-01-21 PROCEDURE — G0378 HOSPITAL OBSERVATION PER HR: HCPCS

## 2021-01-21 PROCEDURE — 85025 COMPLETE CBC W/AUTO DIFF WBC: CPT

## 2021-01-21 PROCEDURE — 82962 GLUCOSE BLOOD TEST: CPT

## 2021-01-21 PROCEDURE — 83690 ASSAY OF LIPASE: CPT

## 2021-01-21 PROCEDURE — 87427 SHIGA-LIKE TOXIN AG IA: CPT | Mod: 59

## 2021-01-21 PROCEDURE — 87086 URINE CULTURE/COLONY COUNT: CPT

## 2021-01-21 PROCEDURE — 63600175 PHARM REV CODE 636 W HCPCS: Performed by: EMERGENCY MEDICINE

## 2021-01-21 PROCEDURE — 96372 THER/PROPH/DIAG INJ SC/IM: CPT | Mod: 59 | Performed by: EMERGENCY MEDICINE

## 2021-01-21 PROCEDURE — 87040 BLOOD CULTURE FOR BACTERIA: CPT | Mod: 59

## 2021-01-21 PROCEDURE — U0002 COVID-19 LAB TEST NON-CDC: HCPCS | Performed by: EMERGENCY MEDICINE

## 2021-01-21 PROCEDURE — 96365 THER/PROPH/DIAG IV INF INIT: CPT | Performed by: EMERGENCY MEDICINE

## 2021-01-21 PROCEDURE — 87045 FECES CULTURE AEROBIC BACT: CPT

## 2021-01-21 PROCEDURE — 63600175 PHARM REV CODE 636 W HCPCS: Performed by: HOSPITALIST

## 2021-01-21 PROCEDURE — 96375 TX/PRO/DX INJ NEW DRUG ADDON: CPT

## 2021-01-21 PROCEDURE — 96376 TX/PRO/DX INJ SAME DRUG ADON: CPT

## 2021-01-21 PROCEDURE — 87449 NOS EACH ORGANISM AG IA: CPT

## 2021-01-21 PROCEDURE — 25000003 PHARM REV CODE 250: Performed by: EMERGENCY MEDICINE

## 2021-01-21 PROCEDURE — 87324 CLOSTRIDIUM AG IA: CPT

## 2021-01-21 PROCEDURE — 99220 PR INITIAL OBSERVATION CARE,LEVL III: CPT | Mod: ,,, | Performed by: HOSPITALIST

## 2021-01-21 PROCEDURE — 80047 BASIC METABLC PNL IONIZED CA: CPT

## 2021-01-21 PROCEDURE — 99285 PR EMERGENCY DEPT VISIT,LEVEL V: ICD-10-PCS | Mod: ,,, | Performed by: EMERGENCY MEDICINE

## 2021-01-21 PROCEDURE — 25000003 PHARM REV CODE 250: Performed by: HOSPITALIST

## 2021-01-21 PROCEDURE — 83605 ASSAY OF LACTIC ACID: CPT

## 2021-01-21 PROCEDURE — 99285 EMERGENCY DEPT VISIT HI MDM: CPT | Mod: 25

## 2021-01-21 PROCEDURE — 25500020 PHARM REV CODE 255: Performed by: EMERGENCY MEDICINE

## 2021-01-21 PROCEDURE — 96361 HYDRATE IV INFUSION ADD-ON: CPT

## 2021-01-21 RX ORDER — HYDROCODONE BITARTRATE AND ACETAMINOPHEN 5; 325 MG/1; MG/1
1 TABLET ORAL EVERY 4 HOURS PRN
Status: DISCONTINUED | OUTPATIENT
Start: 2021-01-21 | End: 2021-02-03 | Stop reason: HOSPADM

## 2021-01-21 RX ORDER — SODIUM CHLORIDE 0.9 % (FLUSH) 0.9 %
10 SYRINGE (ML) INJECTION
Status: CANCELLED | OUTPATIENT
Start: 2021-01-21

## 2021-01-21 RX ORDER — HYDROCODONE BITARTRATE AND ACETAMINOPHEN 10; 325 MG/1; MG/1
1 TABLET ORAL EVERY 4 HOURS PRN
Status: DISCONTINUED | OUTPATIENT
Start: 2021-01-21 | End: 2021-02-03 | Stop reason: HOSPADM

## 2021-01-21 RX ORDER — MORPHINE SULFATE 2 MG/ML
3 INJECTION, SOLUTION INTRAMUSCULAR; INTRAVENOUS
Status: COMPLETED | OUTPATIENT
Start: 2021-01-21 | End: 2021-01-21

## 2021-01-21 RX ORDER — DEXTROSE 50 % IN WATER (D50W) INTRAVENOUS SYRINGE
12.5
Status: COMPLETED | OUTPATIENT
Start: 2021-01-21 | End: 2021-01-21

## 2021-01-21 RX ORDER — AMOXICILLIN 250 MG
1 CAPSULE ORAL 2 TIMES DAILY PRN
Status: DISCONTINUED | OUTPATIENT
Start: 2021-01-21 | End: 2021-02-03 | Stop reason: HOSPADM

## 2021-01-21 RX ORDER — TALC
6 POWDER (GRAM) TOPICAL NIGHTLY PRN
Status: CANCELLED | OUTPATIENT
Start: 2021-01-21

## 2021-01-21 RX ORDER — TALC
6 POWDER (GRAM) TOPICAL NIGHTLY PRN
Status: DISCONTINUED | OUTPATIENT
Start: 2021-01-21 | End: 2021-02-03 | Stop reason: HOSPADM

## 2021-01-21 RX ORDER — SODIUM CHLORIDE 0.9 % (FLUSH) 0.9 %
5 SYRINGE (ML) INJECTION
Status: DISCONTINUED | OUTPATIENT
Start: 2021-01-21 | End: 2021-02-03 | Stop reason: HOSPADM

## 2021-01-21 RX ORDER — CEFTRIAXONE 1 G/1
1 INJECTION, POWDER, FOR SOLUTION INTRAMUSCULAR; INTRAVENOUS
Status: DISCONTINUED | OUTPATIENT
Start: 2021-01-22 | End: 2021-01-25

## 2021-01-21 RX ORDER — ENOXAPARIN SODIUM 100 MG/ML
40 INJECTION SUBCUTANEOUS EVERY 24 HOURS
Status: DISCONTINUED | OUTPATIENT
Start: 2021-01-21 | End: 2021-01-24

## 2021-01-21 RX ORDER — DEXTROSE MONOHYDRATE 50 MG/ML
INJECTION, SOLUTION INTRAVENOUS CONTINUOUS
Status: ACTIVE | OUTPATIENT
Start: 2021-01-21 | End: 2021-01-22

## 2021-01-21 RX ORDER — ONDANSETRON 2 MG/ML
8 INJECTION INTRAMUSCULAR; INTRAVENOUS EVERY 8 HOURS PRN
Status: DISCONTINUED | OUTPATIENT
Start: 2021-01-21 | End: 2021-02-03 | Stop reason: HOSPADM

## 2021-01-21 RX ORDER — ACETAMINOPHEN 325 MG/1
650 TABLET ORAL EVERY 4 HOURS PRN
Status: DISCONTINUED | OUTPATIENT
Start: 2021-01-21 | End: 2021-02-03 | Stop reason: HOSPADM

## 2021-01-21 RX ADMIN — DEXTROSE MONOHYDRATE 12.5 G: 25 INJECTION, SOLUTION INTRAVENOUS at 06:01

## 2021-01-21 RX ADMIN — DEXTROSE 100 ML/HR: 5 SOLUTION INTRAVENOUS at 10:01

## 2021-01-21 RX ADMIN — IOHEXOL 75 ML: 350 INJECTION, SOLUTION INTRAVENOUS at 06:01

## 2021-01-21 RX ADMIN — ENOXAPARIN SODIUM 40 MG: 40 INJECTION SUBCUTANEOUS at 10:01

## 2021-01-21 RX ADMIN — SODIUM CHLORIDE 1000 ML: 0.9 INJECTION, SOLUTION INTRAVENOUS at 03:01

## 2021-01-21 RX ADMIN — PIPERACILLIN AND TAZOBACTAM 4.5 G: 4; .5 INJECTION, POWDER, LYOPHILIZED, FOR SOLUTION INTRAVENOUS; PARENTERAL at 10:01

## 2021-01-21 RX ADMIN — MORPHINE SULFATE 3 MG: 2 INJECTION, SOLUTION INTRAMUSCULAR; INTRAVENOUS at 03:01

## 2021-01-21 RX ADMIN — DEXTROSE MONOHYDRATE 12.5 G: 25 INJECTION, SOLUTION INTRAVENOUS at 04:01

## 2021-01-22 LAB
ALBUMIN SERPL BCP-MCNC: 1.9 G/DL (ref 3.5–5.2)
ALP SERPL-CCNC: 78 U/L (ref 55–135)
ALT SERPL W/O P-5'-P-CCNC: 7 U/L (ref 10–44)
ANION GAP SERPL CALC-SCNC: 8 MMOL/L (ref 8–16)
ANISOCYTOSIS BLD QL SMEAR: SLIGHT
AST SERPL-CCNC: 9 U/L (ref 10–40)
BASOPHILS # BLD AUTO: 0.04 K/UL (ref 0–0.2)
BASOPHILS NFR BLD: 0.2 % (ref 0–1.9)
BILIRUB SERPL-MCNC: 0.7 MG/DL (ref 0.1–1)
BUN SERPL-MCNC: 20 MG/DL (ref 8–23)
BURR CELLS BLD QL SMEAR: ABNORMAL
CALCIUM SERPL-MCNC: 7.9 MG/DL (ref 8.7–10.5)
CHLORIDE SERPL-SCNC: 117 MMOL/L (ref 95–110)
CO2 SERPL-SCNC: 18 MMOL/L (ref 23–29)
CREAT SERPL-MCNC: 0.8 MG/DL (ref 0.5–1.4)
DACRYOCYTES BLD QL SMEAR: ABNORMAL
DIFFERENTIAL METHOD: ABNORMAL
E COLI SXT1 STL QL IA: NEGATIVE
E COLI SXT2 STL QL IA: NEGATIVE
EOSINOPHIL # BLD AUTO: 0 K/UL (ref 0–0.5)
EOSINOPHIL NFR BLD: 0.2 % (ref 0–8)
ERYTHROCYTE [DISTWIDTH] IN BLOOD BY AUTOMATED COUNT: 19.9 % (ref 11.5–14.5)
EST. GFR  (AFRICAN AMERICAN): >60 ML/MIN/1.73 M^2
EST. GFR  (NON AFRICAN AMERICAN): >60 ML/MIN/1.73 M^2
GLUCOSE SERPL-MCNC: 96 MG/DL (ref 70–110)
HCT VFR BLD AUTO: 38.9 % (ref 37–48.5)
HGB BLD-MCNC: 11 G/DL (ref 12–16)
HYPOCHROMIA BLD QL SMEAR: ABNORMAL
IMM GRANULOCYTES # BLD AUTO: 0.12 K/UL (ref 0–0.04)
IMM GRANULOCYTES NFR BLD AUTO: 0.6 % (ref 0–0.5)
LYMPHOCYTES # BLD AUTO: 0.7 K/UL (ref 1–4.8)
LYMPHOCYTES NFR BLD: 3.8 % (ref 18–48)
MAGNESIUM SERPL-MCNC: 1.5 MG/DL (ref 1.6–2.6)
MCH RBC QN AUTO: 29.3 PG (ref 27–31)
MCHC RBC AUTO-ENTMCNC: 28.3 G/DL (ref 32–36)
MCV RBC AUTO: 104 FL (ref 82–98)
MONOCYTES # BLD AUTO: 1.4 K/UL (ref 0.3–1)
MONOCYTES NFR BLD: 7 % (ref 4–15)
NEUTROPHILS # BLD AUTO: 17.1 K/UL (ref 1.8–7.7)
NEUTROPHILS NFR BLD: 88.2 % (ref 38–73)
NRBC BLD-RTO: 0 /100 WBC
OVALOCYTES BLD QL SMEAR: ABNORMAL
PHOSPHATE SERPL-MCNC: 2.7 MG/DL (ref 2.7–4.5)
PLATELET # BLD AUTO: 165 K/UL (ref 150–350)
PMV BLD AUTO: 11.1 FL (ref 9.2–12.9)
POCT GLUCOSE: 105 MG/DL (ref 70–110)
POCT GLUCOSE: 116 MG/DL (ref 70–110)
POCT GLUCOSE: 122 MG/DL (ref 70–110)
POCT GLUCOSE: 78 MG/DL (ref 70–110)
POCT GLUCOSE: 79 MG/DL (ref 70–110)
POCT GLUCOSE: 89 MG/DL (ref 70–110)
POIKILOCYTOSIS BLD QL SMEAR: SLIGHT
POLYCHROMASIA BLD QL SMEAR: ABNORMAL
POTASSIUM SERPL-SCNC: 3.6 MMOL/L (ref 3.5–5.1)
PROT SERPL-MCNC: 4.7 G/DL (ref 6–8.4)
RBC # BLD AUTO: 3.75 M/UL (ref 4–5.4)
SCHISTOCYTES BLD QL SMEAR: ABNORMAL
SODIUM SERPL-SCNC: 143 MMOL/L (ref 136–145)
WBC # BLD AUTO: 19.36 K/UL (ref 3.9–12.7)

## 2021-01-22 PROCEDURE — 96375 TX/PRO/DX INJ NEW DRUG ADDON: CPT | Performed by: EMERGENCY MEDICINE

## 2021-01-22 PROCEDURE — 99226 PR SUBSEQUENT OBSERVATION CARE,LEVEL III: CPT | Mod: ,,, | Performed by: HOSPITALIST

## 2021-01-22 PROCEDURE — G0378 HOSPITAL OBSERVATION PER HR: HCPCS

## 2021-01-22 PROCEDURE — 99226 PR SUBSEQUENT OBSERVATION CARE,LEVEL III: ICD-10-PCS | Mod: ,,, | Performed by: HOSPITALIST

## 2021-01-22 PROCEDURE — 96372 THER/PROPH/DIAG INJ SC/IM: CPT | Mod: 59 | Performed by: EMERGENCY MEDICINE

## 2021-01-22 PROCEDURE — 25000003 PHARM REV CODE 250: Performed by: HOSPITALIST

## 2021-01-22 PROCEDURE — 80053 COMPREHEN METABOLIC PANEL: CPT

## 2021-01-22 PROCEDURE — 85025 COMPLETE CBC W/AUTO DIFF WBC: CPT

## 2021-01-22 PROCEDURE — 84100 ASSAY OF PHOSPHORUS: CPT

## 2021-01-22 PROCEDURE — 83735 ASSAY OF MAGNESIUM: CPT

## 2021-01-22 PROCEDURE — 36415 COLL VENOUS BLD VENIPUNCTURE: CPT

## 2021-01-22 PROCEDURE — 92610 EVALUATE SWALLOWING FUNCTION: CPT

## 2021-01-22 PROCEDURE — 63600175 PHARM REV CODE 636 W HCPCS: Performed by: HOSPITALIST

## 2021-01-22 RX ORDER — DEXTROSE MONOHYDRATE 50 MG/ML
INJECTION, SOLUTION INTRAVENOUS CONTINUOUS
Status: ACTIVE | OUTPATIENT
Start: 2021-01-22 | End: 2021-01-22

## 2021-01-22 RX ADMIN — VANCOMYCIN HYDROCHLORIDE 125 MG: KIT at 06:01

## 2021-01-22 RX ADMIN — VANCOMYCIN HYDROCHLORIDE 125 MG: KIT at 05:01

## 2021-01-22 RX ADMIN — ENOXAPARIN SODIUM 40 MG: 40 INJECTION SUBCUTANEOUS at 04:01

## 2021-01-22 RX ADMIN — DEXTROSE 100 ML/HR: 5 SOLUTION INTRAVENOUS at 09:01

## 2021-01-22 RX ADMIN — VANCOMYCIN HYDROCHLORIDE 125 MG: KIT at 11:01

## 2021-01-22 RX ADMIN — CEFTRIAXONE SODIUM 1 G: 1 INJECTION, POWDER, FOR SOLUTION INTRAMUSCULAR; INTRAVENOUS at 09:01

## 2021-01-23 LAB
ALBUMIN SERPL BCP-MCNC: 1.7 G/DL (ref 3.5–5.2)
ALP SERPL-CCNC: 88 U/L (ref 55–135)
ALT SERPL W/O P-5'-P-CCNC: 5 U/L (ref 10–44)
ANION GAP SERPL CALC-SCNC: 6 MMOL/L (ref 8–16)
ANISOCYTOSIS BLD QL SMEAR: SLIGHT
AST SERPL-CCNC: 10 U/L (ref 10–40)
BACTERIA UR CULT: NORMAL
BACTERIA UR CULT: NORMAL
BASOPHILS # BLD AUTO: 0.06 K/UL (ref 0–0.2)
BASOPHILS NFR BLD: 0.2 % (ref 0–1.9)
BILIRUB SERPL-MCNC: 0.5 MG/DL (ref 0.1–1)
BUN SERPL-MCNC: 20 MG/DL (ref 8–23)
BURR CELLS BLD QL SMEAR: ABNORMAL
CALCIUM SERPL-MCNC: 7.8 MG/DL (ref 8.7–10.5)
CHLORIDE SERPL-SCNC: 115 MMOL/L (ref 95–110)
CO2 SERPL-SCNC: 19 MMOL/L (ref 23–29)
CREAT SERPL-MCNC: 0.8 MG/DL (ref 0.5–1.4)
DIFFERENTIAL METHOD: ABNORMAL
EOSINOPHIL # BLD AUTO: 0 K/UL (ref 0–0.5)
EOSINOPHIL NFR BLD: 0.1 % (ref 0–8)
ERYTHROCYTE [DISTWIDTH] IN BLOOD BY AUTOMATED COUNT: 19.2 % (ref 11.5–14.5)
EST. GFR  (AFRICAN AMERICAN): >60 ML/MIN/1.73 M^2
EST. GFR  (NON AFRICAN AMERICAN): >60 ML/MIN/1.73 M^2
GLUCOSE SERPL-MCNC: 70 MG/DL (ref 70–110)
HCT VFR BLD AUTO: 34.4 % (ref 37–48.5)
HGB BLD-MCNC: 10.1 G/DL (ref 12–16)
IMM GRANULOCYTES # BLD AUTO: 0.22 K/UL (ref 0–0.04)
IMM GRANULOCYTES NFR BLD AUTO: 0.9 % (ref 0–0.5)
LYMPHOCYTES # BLD AUTO: 1.1 K/UL (ref 1–4.8)
LYMPHOCYTES NFR BLD: 4.1 % (ref 18–48)
MAGNESIUM SERPL-MCNC: 1.4 MG/DL (ref 1.6–2.6)
MCH RBC QN AUTO: 29.1 PG (ref 27–31)
MCHC RBC AUTO-ENTMCNC: 29.4 G/DL (ref 32–36)
MCV RBC AUTO: 99 FL (ref 82–98)
MONOCYTES # BLD AUTO: 1.1 K/UL (ref 0.3–1)
MONOCYTES NFR BLD: 4.2 % (ref 4–15)
NEUTROPHILS # BLD AUTO: 23.3 K/UL (ref 1.8–7.7)
NEUTROPHILS NFR BLD: 90.5 % (ref 38–73)
NRBC BLD-RTO: 0 /100 WBC
OVALOCYTES BLD QL SMEAR: ABNORMAL
PHOSPHATE SERPL-MCNC: 3.2 MG/DL (ref 2.7–4.5)
PLATELET # BLD AUTO: 199 K/UL (ref 150–350)
PLATELET BLD QL SMEAR: ABNORMAL
PMV BLD AUTO: 12 FL (ref 9.2–12.9)
POCT GLUCOSE: 70 MG/DL (ref 70–110)
POCT GLUCOSE: 72 MG/DL (ref 70–110)
POCT GLUCOSE: 72 MG/DL (ref 70–110)
POCT GLUCOSE: 76 MG/DL (ref 70–110)
POCT GLUCOSE: 98 MG/DL (ref 70–110)
POIKILOCYTOSIS BLD QL SMEAR: SLIGHT
POTASSIUM SERPL-SCNC: 3.6 MMOL/L (ref 3.5–5.1)
PROT SERPL-MCNC: 4.4 G/DL (ref 6–8.4)
RBC # BLD AUTO: 3.47 M/UL (ref 4–5.4)
SODIUM SERPL-SCNC: 140 MMOL/L (ref 136–145)
WBC # BLD AUTO: 25.75 K/UL (ref 3.9–12.7)

## 2021-01-23 PROCEDURE — 99233 SBSQ HOSP IP/OBS HIGH 50: CPT | Mod: ,,, | Performed by: HOSPITALIST

## 2021-01-23 PROCEDURE — 99497 PR ADVNCD CARE PLAN 30 MIN: ICD-10-PCS | Mod: ,,, | Performed by: HOSPITALIST

## 2021-01-23 PROCEDURE — 84100 ASSAY OF PHOSPHORUS: CPT

## 2021-01-23 PROCEDURE — 99497 ADVNCD CARE PLAN 30 MIN: CPT | Mod: ,,, | Performed by: HOSPITALIST

## 2021-01-23 PROCEDURE — 11000001 HC ACUTE MED/SURG PRIVATE ROOM

## 2021-01-23 PROCEDURE — 99233 PR SUBSEQUENT HOSPITAL CARE,LEVL III: ICD-10-PCS | Mod: ,,, | Performed by: HOSPITALIST

## 2021-01-23 PROCEDURE — 80053 COMPREHEN METABOLIC PANEL: CPT

## 2021-01-23 PROCEDURE — 85025 COMPLETE CBC W/AUTO DIFF WBC: CPT

## 2021-01-23 PROCEDURE — 36415 COLL VENOUS BLD VENIPUNCTURE: CPT

## 2021-01-23 PROCEDURE — 25000003 PHARM REV CODE 250: Performed by: HOSPITALIST

## 2021-01-23 PROCEDURE — 83735 ASSAY OF MAGNESIUM: CPT

## 2021-01-23 PROCEDURE — 63600175 PHARM REV CODE 636 W HCPCS: Performed by: HOSPITALIST

## 2021-01-23 RX ADMIN — VANCOMYCIN HYDROCHLORIDE 125 MG: KIT at 11:01

## 2021-01-23 RX ADMIN — ENOXAPARIN SODIUM 40 MG: 40 INJECTION SUBCUTANEOUS at 05:01

## 2021-01-23 RX ADMIN — VANCOMYCIN HYDROCHLORIDE 125 MG: KIT at 12:01

## 2021-01-23 RX ADMIN — VANCOMYCIN HYDROCHLORIDE 125 MG: KIT at 05:01

## 2021-01-23 RX ADMIN — VANCOMYCIN HYDROCHLORIDE 125 MG: KIT at 06:01

## 2021-01-23 RX ADMIN — CEFTRIAXONE SODIUM 1 G: 1 INJECTION, POWDER, FOR SOLUTION INTRAMUSCULAR; INTRAVENOUS at 09:01

## 2021-01-24 PROBLEM — I95.9 HYPOTENSION: Status: ACTIVE | Noted: 2021-01-24

## 2021-01-24 PROBLEM — T68.XXXA HYPOTHERMIA: Status: ACTIVE | Noted: 2021-01-24

## 2021-01-24 LAB
ACANTHOCYTES BLD QL SMEAR: PRESENT
ALBUMIN SERPL BCP-MCNC: 1.9 G/DL (ref 3.5–5.2)
ALP SERPL-CCNC: 87 U/L (ref 55–135)
ALT SERPL W/O P-5'-P-CCNC: 7 U/L (ref 10–44)
ANION GAP SERPL CALC-SCNC: 7 MMOL/L (ref 8–16)
ANISOCYTOSIS BLD QL SMEAR: SLIGHT
AST SERPL-CCNC: 15 U/L (ref 10–40)
BASOPHILS # BLD AUTO: 0.05 K/UL (ref 0–0.2)
BASOPHILS NFR BLD: 0.2 % (ref 0–1.9)
BILIRUB SERPL-MCNC: 0.4 MG/DL (ref 0.1–1)
BUN SERPL-MCNC: 27 MG/DL (ref 8–23)
BURR CELLS BLD QL SMEAR: ABNORMAL
CALCIUM SERPL-MCNC: 8.1 MG/DL (ref 8.7–10.5)
CHLORIDE SERPL-SCNC: 113 MMOL/L (ref 95–110)
CO2 SERPL-SCNC: 18 MMOL/L (ref 23–29)
CREAT SERPL-MCNC: 1.2 MG/DL (ref 0.5–1.4)
DIFFERENTIAL METHOD: ABNORMAL
EOSINOPHIL # BLD AUTO: 0.1 K/UL (ref 0–0.5)
EOSINOPHIL NFR BLD: 0.5 % (ref 0–8)
ERYTHROCYTE [DISTWIDTH] IN BLOOD BY AUTOMATED COUNT: 18.8 % (ref 11.5–14.5)
EST. GFR  (AFRICAN AMERICAN): 48 ML/MIN/1.73 M^2
EST. GFR  (NON AFRICAN AMERICAN): 41.6 ML/MIN/1.73 M^2
GLUCOSE SERPL-MCNC: 121 MG/DL (ref 70–110)
HCT VFR BLD AUTO: 34.4 % (ref 37–48.5)
HGB BLD-MCNC: 10.5 G/DL (ref 12–16)
IMM GRANULOCYTES # BLD AUTO: 0.37 K/UL (ref 0–0.04)
IMM GRANULOCYTES NFR BLD AUTO: 1.4 % (ref 0–0.5)
LYMPHOCYTES # BLD AUTO: 1.6 K/UL (ref 1–4.8)
LYMPHOCYTES NFR BLD: 5.8 % (ref 18–48)
MAGNESIUM SERPL-MCNC: 2 MG/DL (ref 1.6–2.6)
MCH RBC QN AUTO: 29 PG (ref 27–31)
MCHC RBC AUTO-ENTMCNC: 30.5 G/DL (ref 32–36)
MCV RBC AUTO: 95 FL (ref 82–98)
MONOCYTES # BLD AUTO: 0.8 K/UL (ref 0.3–1)
MONOCYTES NFR BLD: 3 % (ref 4–15)
NEUTROPHILS # BLD AUTO: 24 K/UL (ref 1.8–7.7)
NEUTROPHILS NFR BLD: 89.1 % (ref 38–73)
NRBC BLD-RTO: 0 /100 WBC
OVALOCYTES BLD QL SMEAR: ABNORMAL
PHOSPHATE SERPL-MCNC: 3.7 MG/DL (ref 2.7–4.5)
PLATELET # BLD AUTO: 276 K/UL (ref 150–350)
PLATELET BLD QL SMEAR: ABNORMAL
PMV BLD AUTO: 11 FL (ref 9.2–12.9)
POCT GLUCOSE: 108 MG/DL (ref 70–110)
POCT GLUCOSE: 112 MG/DL (ref 70–110)
POCT GLUCOSE: 124 MG/DL (ref 70–110)
POCT GLUCOSE: 76 MG/DL (ref 70–110)
POCT GLUCOSE: 78 MG/DL (ref 70–110)
POCT GLUCOSE: 92 MG/DL (ref 70–110)
POIKILOCYTOSIS BLD QL SMEAR: SLIGHT
POTASSIUM SERPL-SCNC: 4.1 MMOL/L (ref 3.5–5.1)
PROT SERPL-MCNC: 4.8 G/DL (ref 6–8.4)
RBC # BLD AUTO: 3.62 M/UL (ref 4–5.4)
SCHISTOCYTES BLD QL SMEAR: ABNORMAL
SODIUM SERPL-SCNC: 138 MMOL/L (ref 136–145)
T4 FREE SERPL-MCNC: 1.01 NG/DL (ref 0.71–1.51)
TSH SERPL DL<=0.005 MIU/L-ACNC: 0.03 UIU/ML (ref 0.4–4)
WBC # BLD AUTO: 26.94 K/UL (ref 3.9–12.7)

## 2021-01-24 PROCEDURE — 93010 ELECTROCARDIOGRAM REPORT: CPT | Mod: ,,, | Performed by: INTERNAL MEDICINE

## 2021-01-24 PROCEDURE — 63600175 PHARM REV CODE 636 W HCPCS: Performed by: PHYSICIAN ASSISTANT

## 2021-01-24 PROCEDURE — 36415 COLL VENOUS BLD VENIPUNCTURE: CPT

## 2021-01-24 PROCEDURE — 83735 ASSAY OF MAGNESIUM: CPT

## 2021-01-24 PROCEDURE — 84439 ASSAY OF FREE THYROXINE: CPT

## 2021-01-24 PROCEDURE — 84443 ASSAY THYROID STIM HORMONE: CPT

## 2021-01-24 PROCEDURE — 63600175 PHARM REV CODE 636 W HCPCS: Performed by: HOSPITALIST

## 2021-01-24 PROCEDURE — 25000003 PHARM REV CODE 250: Performed by: PHYSICIAN ASSISTANT

## 2021-01-24 PROCEDURE — 93010 EKG 12-LEAD: ICD-10-PCS | Mod: ,,, | Performed by: INTERNAL MEDICINE

## 2021-01-24 PROCEDURE — 25000003 PHARM REV CODE 250: Performed by: HOSPITALIST

## 2021-01-24 PROCEDURE — 99233 SBSQ HOSP IP/OBS HIGH 50: CPT | Mod: ,,, | Performed by: HOSPITALIST

## 2021-01-24 PROCEDURE — 93005 ELECTROCARDIOGRAM TRACING: CPT

## 2021-01-24 PROCEDURE — 11000001 HC ACUTE MED/SURG PRIVATE ROOM

## 2021-01-24 PROCEDURE — 80053 COMPREHEN METABOLIC PANEL: CPT

## 2021-01-24 PROCEDURE — 99233 PR SUBSEQUENT HOSPITAL CARE,LEVL III: ICD-10-PCS | Mod: ,,, | Performed by: HOSPITALIST

## 2021-01-24 PROCEDURE — 99497 ADVNCD CARE PLAN 30 MIN: CPT | Mod: ,,, | Performed by: HOSPITALIST

## 2021-01-24 PROCEDURE — 85025 COMPLETE CBC W/AUTO DIFF WBC: CPT

## 2021-01-24 PROCEDURE — 84100 ASSAY OF PHOSPHORUS: CPT

## 2021-01-24 PROCEDURE — 99497 PR ADVNCD CARE PLAN 30 MIN: ICD-10-PCS | Mod: ,,, | Performed by: HOSPITALIST

## 2021-01-24 RX ORDER — DILTIAZEM HYDROCHLORIDE 5 MG/ML
10 INJECTION INTRAVENOUS ONCE AS NEEDED
Status: DISCONTINUED | OUTPATIENT
Start: 2021-01-24 | End: 2021-02-03 | Stop reason: HOSPADM

## 2021-01-24 RX ORDER — METOPROLOL TARTRATE 1 MG/ML
5 INJECTION, SOLUTION INTRAVENOUS EVERY 5 MIN PRN
Status: COMPLETED | OUTPATIENT
Start: 2021-01-24 | End: 2021-01-24

## 2021-01-24 RX ORDER — METOPROLOL TARTRATE 1 MG/ML
2.5 INJECTION, SOLUTION INTRAVENOUS EVERY 5 MIN PRN
Status: DISCONTINUED | OUTPATIENT
Start: 2021-01-24 | End: 2021-01-24

## 2021-01-24 RX ORDER — MAGNESIUM SULFATE HEPTAHYDRATE 40 MG/ML
2 INJECTION, SOLUTION INTRAVENOUS ONCE
Status: COMPLETED | OUTPATIENT
Start: 2021-01-24 | End: 2021-01-24

## 2021-01-24 RX ORDER — SODIUM CHLORIDE 9 MG/ML
INJECTION, SOLUTION INTRAVENOUS CONTINUOUS
Status: ACTIVE | OUTPATIENT
Start: 2021-01-24 | End: 2021-01-24

## 2021-01-24 RX ORDER — DILTIAZEM HYDROCHLORIDE 5 MG/ML
10 INJECTION INTRAVENOUS ONCE
Status: COMPLETED | OUTPATIENT
Start: 2021-01-24 | End: 2021-01-24

## 2021-01-24 RX ORDER — MUPIROCIN 20 MG/G
OINTMENT TOPICAL 2 TIMES DAILY
Status: DISPENSED | OUTPATIENT
Start: 2021-01-24 | End: 2021-01-29

## 2021-01-24 RX ORDER — ENOXAPARIN SODIUM 100 MG/ML
1 INJECTION SUBCUTANEOUS 2 TIMES DAILY
Status: DISCONTINUED | OUTPATIENT
Start: 2021-01-24 | End: 2021-01-26

## 2021-01-24 RX ADMIN — VANCOMYCIN HYDROCHLORIDE 125 MG: KIT at 12:01

## 2021-01-24 RX ADMIN — SODIUM CHLORIDE, SODIUM LACTATE, POTASSIUM CHLORIDE, AND CALCIUM CHLORIDE 500 ML: .6; .31; .03; .02 INJECTION, SOLUTION INTRAVENOUS at 02:01

## 2021-01-24 RX ADMIN — DILTIAZEM HYDROCHLORIDE 10 MG: 5 INJECTION INTRAVENOUS at 04:01

## 2021-01-24 RX ADMIN — MAGNESIUM SULFATE 2 G: 2 INJECTION INTRAVENOUS at 02:01

## 2021-01-24 RX ADMIN — ONDANSETRON 8 MG: 2 INJECTION INTRAMUSCULAR; INTRAVENOUS at 04:01

## 2021-01-24 RX ADMIN — VANCOMYCIN HYDROCHLORIDE 125 MG: KIT at 06:01

## 2021-01-24 RX ADMIN — ENOXAPARIN SODIUM 70 MG: 80 INJECTION SUBCUTANEOUS at 09:01

## 2021-01-24 RX ADMIN — METOROPROLOL TARTRATE 5 MG: 5 INJECTION, SOLUTION INTRAVENOUS at 02:01

## 2021-01-24 RX ADMIN — METOROPROLOL TARTRATE 5 MG: 5 INJECTION, SOLUTION INTRAVENOUS at 03:01

## 2021-01-24 RX ADMIN — MUPIROCIN: 20 OINTMENT TOPICAL at 09:01

## 2021-01-24 RX ADMIN — VANCOMYCIN HYDROCHLORIDE 125 MG: KIT at 05:01

## 2021-01-24 RX ADMIN — VANCOMYCIN HYDROCHLORIDE 125 MG: KIT at 11:01

## 2021-01-24 RX ADMIN — POTASSIUM BICARBONATE 50 MEQ: 25 TABLET, EFFERVESCENT ORAL at 02:01

## 2021-01-24 RX ADMIN — SODIUM CHLORIDE: 0.9 INJECTION, SOLUTION INTRAVENOUS at 11:01

## 2021-01-24 RX ADMIN — CEFTRIAXONE SODIUM 1 G: 1 INJECTION, POWDER, FOR SOLUTION INTRAMUSCULAR; INTRAVENOUS at 11:01

## 2021-01-25 LAB
ALBUMIN SERPL BCP-MCNC: 1.9 G/DL (ref 3.5–5.2)
ALP SERPL-CCNC: 76 U/L (ref 55–135)
ALT SERPL W/O P-5'-P-CCNC: 8 U/L (ref 10–44)
ANION GAP SERPL CALC-SCNC: 5 MMOL/L (ref 8–16)
ANION GAP SERPL CALC-SCNC: 8 MMOL/L (ref 8–16)
AST SERPL-CCNC: 13 U/L (ref 10–40)
BACTERIA STL CULT: NORMAL
BASOPHILS # BLD AUTO: 0.02 K/UL (ref 0–0.2)
BASOPHILS NFR BLD: 0.2 % (ref 0–1.9)
BILIRUB SERPL-MCNC: 0.3 MG/DL (ref 0.1–1)
BUN SERPL-MCNC: 30 MG/DL (ref 8–23)
BUN SERPL-MCNC: 30 MG/DL (ref 8–23)
CALCIUM SERPL-MCNC: 7.5 MG/DL (ref 8.7–10.5)
CALCIUM SERPL-MCNC: 7.8 MG/DL (ref 8.7–10.5)
CHLORIDE SERPL-SCNC: 111 MMOL/L (ref 95–110)
CHLORIDE SERPL-SCNC: 113 MMOL/L (ref 95–110)
CO2 SERPL-SCNC: 18 MMOL/L (ref 23–29)
CO2 SERPL-SCNC: 19 MMOL/L (ref 23–29)
CREAT SERPL-MCNC: 1.3 MG/DL (ref 0.5–1.4)
CREAT SERPL-MCNC: 1.3 MG/DL (ref 0.5–1.4)
CREAT UR-MCNC: 99 MG/DL (ref 15–325)
DIFFERENTIAL METHOD: ABNORMAL
EOSINOPHIL # BLD AUTO: 0.1 K/UL (ref 0–0.5)
EOSINOPHIL NFR BLD: 1.4 % (ref 0–8)
ERYTHROCYTE [DISTWIDTH] IN BLOOD BY AUTOMATED COUNT: 18.3 % (ref 11.5–14.5)
EST. GFR  (AFRICAN AMERICAN): 43.5 ML/MIN/1.73 M^2
EST. GFR  (AFRICAN AMERICAN): 43.5 ML/MIN/1.73 M^2
EST. GFR  (NON AFRICAN AMERICAN): 37.8 ML/MIN/1.73 M^2
EST. GFR  (NON AFRICAN AMERICAN): 37.8 ML/MIN/1.73 M^2
GLUCOSE SERPL-MCNC: 81 MG/DL (ref 70–110)
GLUCOSE SERPL-MCNC: 90 MG/DL (ref 70–110)
HCT VFR BLD AUTO: 31.7 % (ref 37–48.5)
HGB BLD-MCNC: 9.7 G/DL (ref 12–16)
IMM GRANULOCYTES # BLD AUTO: 0.18 K/UL (ref 0–0.04)
IMM GRANULOCYTES NFR BLD AUTO: 1.9 % (ref 0–0.5)
LYMPHOCYTES # BLD AUTO: 1.2 K/UL (ref 1–4.8)
LYMPHOCYTES NFR BLD: 12.6 % (ref 18–48)
MAGNESIUM SERPL-MCNC: 1.8 MG/DL (ref 1.6–2.6)
MAGNESIUM SERPL-MCNC: 2.1 MG/DL (ref 1.6–2.6)
MCH RBC QN AUTO: 29.2 PG (ref 27–31)
MCHC RBC AUTO-ENTMCNC: 30.6 G/DL (ref 32–36)
MCV RBC AUTO: 96 FL (ref 82–98)
MONOCYTES # BLD AUTO: 0.5 K/UL (ref 0.3–1)
MONOCYTES NFR BLD: 5 % (ref 4–15)
NEUTROPHILS # BLD AUTO: 7.3 K/UL (ref 1.8–7.7)
NEUTROPHILS NFR BLD: 78.9 % (ref 38–73)
NRBC BLD-RTO: 0 /100 WBC
PHOSPHATE SERPL-MCNC: 3.3 MG/DL (ref 2.7–4.5)
PLATELET # BLD AUTO: 226 K/UL (ref 150–350)
PMV BLD AUTO: 11.5 FL (ref 9.2–12.9)
POCT GLUCOSE: 100 MG/DL (ref 70–110)
POCT GLUCOSE: 104 MG/DL (ref 70–110)
POCT GLUCOSE: 105 MG/DL (ref 70–110)
POCT GLUCOSE: 116 MG/DL (ref 70–110)
POCT GLUCOSE: 89 MG/DL (ref 70–110)
POCT GLUCOSE: 93 MG/DL (ref 70–110)
POTASSIUM SERPL-SCNC: 3.7 MMOL/L (ref 3.5–5.1)
POTASSIUM SERPL-SCNC: 4.4 MMOL/L (ref 3.5–5.1)
PROT SERPL-MCNC: 4.5 G/DL (ref 6–8.4)
RBC # BLD AUTO: 3.32 M/UL (ref 4–5.4)
SODIUM SERPL-SCNC: 137 MMOL/L (ref 136–145)
SODIUM SERPL-SCNC: 137 MMOL/L (ref 136–145)
SODIUM UR-SCNC: <20 MMOL/L (ref 20–250)
WBC # BLD AUTO: 9.24 K/UL (ref 3.9–12.7)

## 2021-01-25 PROCEDURE — 93010 ELECTROCARDIOGRAM REPORT: CPT | Mod: ,,, | Performed by: INTERNAL MEDICINE

## 2021-01-25 PROCEDURE — 25000003 PHARM REV CODE 250: Performed by: HOSPITALIST

## 2021-01-25 PROCEDURE — 82570 ASSAY OF URINE CREATININE: CPT

## 2021-01-25 PROCEDURE — 84100 ASSAY OF PHOSPHORUS: CPT

## 2021-01-25 PROCEDURE — 80053 COMPREHEN METABOLIC PANEL: CPT

## 2021-01-25 PROCEDURE — 99233 SBSQ HOSP IP/OBS HIGH 50: CPT | Mod: ,,, | Performed by: HOSPITALIST

## 2021-01-25 PROCEDURE — 93010 EKG 12-LEAD: ICD-10-PCS | Mod: ,,, | Performed by: INTERNAL MEDICINE

## 2021-01-25 PROCEDURE — 11000001 HC ACUTE MED/SURG PRIVATE ROOM

## 2021-01-25 PROCEDURE — 83735 ASSAY OF MAGNESIUM: CPT

## 2021-01-25 PROCEDURE — 99233 PR SUBSEQUENT HOSPITAL CARE,LEVL III: ICD-10-PCS | Mod: ,,, | Performed by: HOSPITALIST

## 2021-01-25 PROCEDURE — 93005 ELECTROCARDIOGRAM TRACING: CPT

## 2021-01-25 PROCEDURE — 63600175 PHARM REV CODE 636 W HCPCS: Mod: JG | Performed by: HOSPITALIST

## 2021-01-25 PROCEDURE — 84300 ASSAY OF URINE SODIUM: CPT

## 2021-01-25 PROCEDURE — 85025 COMPLETE CBC W/AUTO DIFF WBC: CPT

## 2021-01-25 PROCEDURE — 83735 ASSAY OF MAGNESIUM: CPT | Mod: 91

## 2021-01-25 PROCEDURE — 63600175 PHARM REV CODE 636 W HCPCS: Performed by: PHYSICIAN ASSISTANT

## 2021-01-25 PROCEDURE — P9047 ALBUMIN (HUMAN), 25%, 50ML: HCPCS | Mod: JG | Performed by: HOSPITALIST

## 2021-01-25 PROCEDURE — 36415 COLL VENOUS BLD VENIPUNCTURE: CPT

## 2021-01-25 PROCEDURE — 63600175 PHARM REV CODE 636 W HCPCS: Performed by: HOSPITALIST

## 2021-01-25 PROCEDURE — 80048 BASIC METABOLIC PNL TOTAL CA: CPT

## 2021-01-25 RX ORDER — ALBUMIN HUMAN 250 G/1000ML
12.5 SOLUTION INTRAVENOUS EVERY 8 HOURS
Status: COMPLETED | OUTPATIENT
Start: 2021-01-25 | End: 2021-01-26

## 2021-01-25 RX ORDER — MAGNESIUM SULFATE HEPTAHYDRATE 40 MG/ML
2 INJECTION, SOLUTION INTRAVENOUS ONCE
Status: COMPLETED | OUTPATIENT
Start: 2021-01-25 | End: 2021-01-25

## 2021-01-25 RX ORDER — POTASSIUM CHLORIDE 20 MEQ/1
40 TABLET, EXTENDED RELEASE ORAL ONCE
Status: COMPLETED | OUTPATIENT
Start: 2021-01-25 | End: 2021-01-25

## 2021-01-25 RX ADMIN — ENOXAPARIN SODIUM 70 MG: 80 INJECTION SUBCUTANEOUS at 10:01

## 2021-01-25 RX ADMIN — ALBUMIN (HUMAN) 12.5 G: 12.5 SOLUTION INTRAVENOUS at 09:01

## 2021-01-25 RX ADMIN — VANCOMYCIN HYDROCHLORIDE 125 MG: KIT at 06:01

## 2021-01-25 RX ADMIN — MAGNESIUM SULFATE 2 G: 2 INJECTION INTRAVENOUS at 02:01

## 2021-01-25 RX ADMIN — MUPIROCIN: 20 OINTMENT TOPICAL at 09:01

## 2021-01-25 RX ADMIN — VANCOMYCIN HYDROCHLORIDE 125 MG: KIT at 11:01

## 2021-01-25 RX ADMIN — METOPROLOL SUCCINATE 12.5 MG: 25 TABLET, EXTENDED RELEASE ORAL at 10:01

## 2021-01-25 RX ADMIN — MUPIROCIN: 20 OINTMENT TOPICAL at 10:01

## 2021-01-25 RX ADMIN — VANCOMYCIN HYDROCHLORIDE 125 MG: KIT at 05:01

## 2021-01-25 RX ADMIN — ENOXAPARIN SODIUM 70 MG: 80 INJECTION SUBCUTANEOUS at 09:01

## 2021-01-25 RX ADMIN — POTASSIUM CHLORIDE 40 MEQ: 1500 TABLET, EXTENDED RELEASE ORAL at 02:01

## 2021-01-25 RX ADMIN — ALBUMIN (HUMAN) 12.5 G: 12.5 SOLUTION INTRAVENOUS at 02:01

## 2021-01-26 LAB
ALBUMIN SERPL BCP-MCNC: 2.7 G/DL (ref 3.5–5.2)
ALP SERPL-CCNC: 43 U/L (ref 55–135)
ALT SERPL W/O P-5'-P-CCNC: 8 U/L (ref 10–44)
ANION GAP SERPL CALC-SCNC: 6 MMOL/L (ref 8–16)
AST SERPL-CCNC: 13 U/L (ref 10–40)
BASOPHILS # BLD AUTO: 0.02 K/UL (ref 0–0.2)
BASOPHILS # BLD AUTO: 0.02 K/UL (ref 0–0.2)
BASOPHILS NFR BLD: 0.2 % (ref 0–1.9)
BASOPHILS NFR BLD: 0.3 % (ref 0–1.9)
BILIRUB SERPL-MCNC: 0.3 MG/DL (ref 0.1–1)
BUN SERPL-MCNC: 31 MG/DL (ref 8–23)
CALCIUM SERPL-MCNC: 8.2 MG/DL (ref 8.7–10.5)
CHLORIDE SERPL-SCNC: 111 MMOL/L (ref 95–110)
CO2 SERPL-SCNC: 20 MMOL/L (ref 23–29)
CREAT SERPL-MCNC: 0.9 MG/DL (ref 0.5–1.4)
DIFFERENTIAL METHOD: ABNORMAL
DIFFERENTIAL METHOD: ABNORMAL
EOSINOPHIL # BLD AUTO: 0.1 K/UL (ref 0–0.5)
EOSINOPHIL # BLD AUTO: 0.2 K/UL (ref 0–0.5)
EOSINOPHIL NFR BLD: 1.4 % (ref 0–8)
EOSINOPHIL NFR BLD: 1.6 % (ref 0–8)
ERYTHROCYTE [DISTWIDTH] IN BLOOD BY AUTOMATED COUNT: 18.3 % (ref 11.5–14.5)
ERYTHROCYTE [DISTWIDTH] IN BLOOD BY AUTOMATED COUNT: 18.3 % (ref 11.5–14.5)
EST. GFR  (AFRICAN AMERICAN): >60 ML/MIN/1.73 M^2
EST. GFR  (NON AFRICAN AMERICAN): 58.9 ML/MIN/1.73 M^2
GLUCOSE SERPL-MCNC: 86 MG/DL (ref 70–110)
HCT VFR BLD AUTO: 29.6 % (ref 37–48.5)
HCT VFR BLD AUTO: 30 % (ref 37–48.5)
HGB BLD-MCNC: 8.8 G/DL (ref 12–16)
HGB BLD-MCNC: 8.8 G/DL (ref 12–16)
IMM GRANULOCYTES # BLD AUTO: 0.14 K/UL (ref 0–0.04)
IMM GRANULOCYTES # BLD AUTO: 0.19 K/UL (ref 0–0.04)
IMM GRANULOCYTES NFR BLD AUTO: 1.9 % (ref 0–0.5)
IMM GRANULOCYTES NFR BLD AUTO: 2.1 % (ref 0–0.5)
LYMPHOCYTES # BLD AUTO: 1.1 K/UL (ref 1–4.8)
LYMPHOCYTES # BLD AUTO: 1.2 K/UL (ref 1–4.8)
LYMPHOCYTES NFR BLD: 13.1 % (ref 18–48)
LYMPHOCYTES NFR BLD: 15.1 % (ref 18–48)
MAGNESIUM SERPL-MCNC: 2.2 MG/DL (ref 1.6–2.6)
MCH RBC QN AUTO: 28.3 PG (ref 27–31)
MCH RBC QN AUTO: 28.9 PG (ref 27–31)
MCHC RBC AUTO-ENTMCNC: 29.3 G/DL (ref 32–36)
MCHC RBC AUTO-ENTMCNC: 29.7 G/DL (ref 32–36)
MCV RBC AUTO: 97 FL (ref 82–98)
MCV RBC AUTO: 97 FL (ref 82–98)
MONOCYTES # BLD AUTO: 0.5 K/UL (ref 0.3–1)
MONOCYTES # BLD AUTO: 0.5 K/UL (ref 0.3–1)
MONOCYTES NFR BLD: 5.3 % (ref 4–15)
MONOCYTES NFR BLD: 6.4 % (ref 4–15)
NEUTROPHILS # BLD AUTO: 5.4 K/UL (ref 1.8–7.7)
NEUTROPHILS # BLD AUTO: 7.1 K/UL (ref 1.8–7.7)
NEUTROPHILS NFR BLD: 74.9 % (ref 38–73)
NEUTROPHILS NFR BLD: 77.7 % (ref 38–73)
NRBC BLD-RTO: 0 /100 WBC
NRBC BLD-RTO: 0 /100 WBC
PHOSPHATE SERPL-MCNC: 4.4 MG/DL (ref 2.7–4.5)
PLATELET # BLD AUTO: 213 K/UL (ref 150–350)
PLATELET # BLD AUTO: 231 K/UL (ref 150–350)
PMV BLD AUTO: 11.2 FL (ref 9.2–12.9)
PMV BLD AUTO: 11.9 FL (ref 9.2–12.9)
POCT GLUCOSE: 123 MG/DL (ref 70–110)
POCT GLUCOSE: 67 MG/DL (ref 70–110)
POCT GLUCOSE: 88 MG/DL (ref 70–110)
POCT GLUCOSE: 89 MG/DL (ref 70–110)
POCT GLUCOSE: 93 MG/DL (ref 70–110)
POTASSIUM SERPL-SCNC: 4.2 MMOL/L (ref 3.5–5.1)
PROT SERPL-MCNC: 4.7 G/DL (ref 6–8.4)
RBC # BLD AUTO: 3.05 M/UL (ref 4–5.4)
RBC # BLD AUTO: 3.11 M/UL (ref 4–5.4)
SODIUM SERPL-SCNC: 137 MMOL/L (ref 136–145)
WBC # BLD AUTO: 7.2 K/UL (ref 3.9–12.7)
WBC # BLD AUTO: 9.1 K/UL (ref 3.9–12.7)

## 2021-01-26 PROCEDURE — 25000003 PHARM REV CODE 250: Performed by: HOSPITALIST

## 2021-01-26 PROCEDURE — 99233 PR SUBSEQUENT HOSPITAL CARE,LEVL III: ICD-10-PCS | Mod: ,,, | Performed by: HOSPITALIST

## 2021-01-26 PROCEDURE — 80053 COMPREHEN METABOLIC PANEL: CPT

## 2021-01-26 PROCEDURE — 63600175 PHARM REV CODE 636 W HCPCS: Performed by: PHYSICIAN ASSISTANT

## 2021-01-26 PROCEDURE — 99233 SBSQ HOSP IP/OBS HIGH 50: CPT | Mod: ,,, | Performed by: HOSPITALIST

## 2021-01-26 PROCEDURE — 85025 COMPLETE CBC W/AUTO DIFF WBC: CPT

## 2021-01-26 PROCEDURE — 84100 ASSAY OF PHOSPHORUS: CPT

## 2021-01-26 PROCEDURE — 36415 COLL VENOUS BLD VENIPUNCTURE: CPT

## 2021-01-26 PROCEDURE — 11000001 HC ACUTE MED/SURG PRIVATE ROOM

## 2021-01-26 PROCEDURE — P9047 ALBUMIN (HUMAN), 25%, 50ML: HCPCS | Mod: JG | Performed by: HOSPITALIST

## 2021-01-26 PROCEDURE — 63600175 PHARM REV CODE 636 W HCPCS: Mod: JG | Performed by: HOSPITALIST

## 2021-01-26 PROCEDURE — 83735 ASSAY OF MAGNESIUM: CPT

## 2021-01-26 RX ORDER — ENOXAPARIN SODIUM 100 MG/ML
40 INJECTION SUBCUTANEOUS DAILY
Status: DISCONTINUED | OUTPATIENT
Start: 2021-01-27 | End: 2021-02-03 | Stop reason: HOSPADM

## 2021-01-26 RX ADMIN — VANCOMYCIN HYDROCHLORIDE 125 MG: KIT at 01:01

## 2021-01-26 RX ADMIN — VANCOMYCIN HYDROCHLORIDE 125 MG: KIT at 06:01

## 2021-01-26 RX ADMIN — ALBUMIN (HUMAN) 12.5 G: 12.5 SOLUTION INTRAVENOUS at 06:01

## 2021-01-26 RX ADMIN — METOPROLOL SUCCINATE 12.5 MG: 25 TABLET, EXTENDED RELEASE ORAL at 09:01

## 2021-01-26 RX ADMIN — MUPIROCIN: 20 OINTMENT TOPICAL at 09:01

## 2021-01-26 RX ADMIN — VANCOMYCIN HYDROCHLORIDE 125 MG: KIT at 12:01

## 2021-01-26 RX ADMIN — VANCOMYCIN HYDROCHLORIDE 125 MG: KIT at 05:01

## 2021-01-26 RX ADMIN — ENOXAPARIN SODIUM 70 MG: 80 INJECTION SUBCUTANEOUS at 09:01

## 2021-01-27 LAB
ALBUMIN SERPL BCP-MCNC: 2.6 G/DL (ref 3.5–5.2)
ALP SERPL-CCNC: 56 U/L (ref 55–135)
ALT SERPL W/O P-5'-P-CCNC: 9 U/L (ref 10–44)
ANION GAP SERPL CALC-SCNC: 5 MMOL/L (ref 8–16)
AST SERPL-CCNC: 14 U/L (ref 10–40)
BACTERIA BLD CULT: NORMAL
BACTERIA BLD CULT: NORMAL
BASOPHILS # BLD AUTO: 0.04 K/UL (ref 0–0.2)
BASOPHILS NFR BLD: 0.6 % (ref 0–1.9)
BILIRUB SERPL-MCNC: 0.3 MG/DL (ref 0.1–1)
BUN SERPL-MCNC: 25 MG/DL (ref 8–23)
CALCIUM SERPL-MCNC: 8.1 MG/DL (ref 8.7–10.5)
CHLORIDE SERPL-SCNC: 111 MMOL/L (ref 95–110)
CO2 SERPL-SCNC: 17 MMOL/L (ref 23–29)
CREAT SERPL-MCNC: 0.9 MG/DL (ref 0.5–1.4)
DIFFERENTIAL METHOD: ABNORMAL
EOSINOPHIL # BLD AUTO: 0.1 K/UL (ref 0–0.5)
EOSINOPHIL NFR BLD: 2 % (ref 0–8)
ERYTHROCYTE [DISTWIDTH] IN BLOOD BY AUTOMATED COUNT: 18 % (ref 11.5–14.5)
EST. GFR  (AFRICAN AMERICAN): >60 ML/MIN/1.73 M^2
EST. GFR  (NON AFRICAN AMERICAN): 58.9 ML/MIN/1.73 M^2
GLUCOSE SERPL-MCNC: 79 MG/DL (ref 70–110)
HCT VFR BLD AUTO: 33.2 % (ref 37–48.5)
HGB BLD-MCNC: 10.2 G/DL (ref 12–16)
IMM GRANULOCYTES # BLD AUTO: 0.14 K/UL (ref 0–0.04)
IMM GRANULOCYTES NFR BLD AUTO: 2 % (ref 0–0.5)
LYMPHOCYTES # BLD AUTO: 1.6 K/UL (ref 1–4.8)
LYMPHOCYTES NFR BLD: 23.5 % (ref 18–48)
MAGNESIUM SERPL-MCNC: 2.1 MG/DL (ref 1.6–2.6)
MCH RBC QN AUTO: 28.5 PG (ref 27–31)
MCHC RBC AUTO-ENTMCNC: 30.7 G/DL (ref 32–36)
MCV RBC AUTO: 93 FL (ref 82–98)
MONOCYTES # BLD AUTO: 0.6 K/UL (ref 0.3–1)
MONOCYTES NFR BLD: 9.1 % (ref 4–15)
NEUTROPHILS # BLD AUTO: 4.3 K/UL (ref 1.8–7.7)
NEUTROPHILS NFR BLD: 62.8 % (ref 38–73)
NRBC BLD-RTO: 0 /100 WBC
PHOSPHATE SERPL-MCNC: 2.4 MG/DL (ref 2.7–4.5)
PLATELET # BLD AUTO: 160 K/UL (ref 150–350)
PMV BLD AUTO: 11.3 FL (ref 9.2–12.9)
POCT GLUCOSE: 105 MG/DL (ref 70–110)
POCT GLUCOSE: 112 MG/DL (ref 70–110)
POCT GLUCOSE: 86 MG/DL (ref 70–110)
POCT GLUCOSE: 89 MG/DL (ref 70–110)
POCT GLUCOSE: 97 MG/DL (ref 70–110)
POTASSIUM SERPL-SCNC: 4.2 MMOL/L (ref 3.5–5.1)
PROT SERPL-MCNC: 4.8 G/DL (ref 6–8.4)
RBC # BLD AUTO: 3.58 M/UL (ref 4–5.4)
SODIUM SERPL-SCNC: 133 MMOL/L (ref 136–145)
WBC # BLD AUTO: 6.9 K/UL (ref 3.9–12.7)

## 2021-01-27 PROCEDURE — 99233 SBSQ HOSP IP/OBS HIGH 50: CPT | Mod: ,,, | Performed by: HOSPITALIST

## 2021-01-27 PROCEDURE — 25000003 PHARM REV CODE 250: Performed by: HOSPITALIST

## 2021-01-27 PROCEDURE — 85025 COMPLETE CBC W/AUTO DIFF WBC: CPT

## 2021-01-27 PROCEDURE — 84100 ASSAY OF PHOSPHORUS: CPT

## 2021-01-27 PROCEDURE — 80053 COMPREHEN METABOLIC PANEL: CPT

## 2021-01-27 PROCEDURE — 83735 ASSAY OF MAGNESIUM: CPT

## 2021-01-27 PROCEDURE — 99233 PR SUBSEQUENT HOSPITAL CARE,LEVL III: ICD-10-PCS | Mod: ,,, | Performed by: HOSPITALIST

## 2021-01-27 PROCEDURE — 36415 COLL VENOUS BLD VENIPUNCTURE: CPT

## 2021-01-27 PROCEDURE — 63600175 PHARM REV CODE 636 W HCPCS: Performed by: HOSPITALIST

## 2021-01-27 PROCEDURE — 97535 SELF CARE MNGMENT TRAINING: CPT

## 2021-01-27 PROCEDURE — 11000001 HC ACUTE MED/SURG PRIVATE ROOM

## 2021-01-27 RX ADMIN — VANCOMYCIN HYDROCHLORIDE 125 MG: KIT at 06:01

## 2021-01-27 RX ADMIN — MUPIROCIN: 20 OINTMENT TOPICAL at 09:01

## 2021-01-27 RX ADMIN — VANCOMYCIN HYDROCHLORIDE 125 MG: KIT at 05:01

## 2021-01-27 RX ADMIN — VANCOMYCIN HYDROCHLORIDE 125 MG: KIT at 02:01

## 2021-01-27 RX ADMIN — ENOXAPARIN SODIUM 40 MG: 40 INJECTION SUBCUTANEOUS at 09:01

## 2021-01-27 RX ADMIN — VANCOMYCIN HYDROCHLORIDE 125 MG: KIT at 12:01

## 2021-01-27 RX ADMIN — METOPROLOL SUCCINATE 12.5 MG: 25 TABLET, EXTENDED RELEASE ORAL at 09:01

## 2021-01-28 LAB
ALBUMIN SERPL BCP-MCNC: 2.2 G/DL (ref 3.5–5.2)
ALP SERPL-CCNC: 54 U/L (ref 55–135)
ALT SERPL W/O P-5'-P-CCNC: 10 U/L (ref 10–44)
ANION GAP SERPL CALC-SCNC: 5 MMOL/L (ref 8–16)
AST SERPL-CCNC: 15 U/L (ref 10–40)
BASOPHILS # BLD AUTO: 0.04 K/UL (ref 0–0.2)
BASOPHILS NFR BLD: 0.7 % (ref 0–1.9)
BILIRUB SERPL-MCNC: 0.4 MG/DL (ref 0.1–1)
BUN SERPL-MCNC: 23 MG/DL (ref 8–23)
CALCIUM SERPL-MCNC: 8.1 MG/DL (ref 8.7–10.5)
CHLORIDE SERPL-SCNC: 116 MMOL/L (ref 95–110)
CO2 SERPL-SCNC: 20 MMOL/L (ref 23–29)
CREAT SERPL-MCNC: 0.7 MG/DL (ref 0.5–1.4)
DIFFERENTIAL METHOD: ABNORMAL
EOSINOPHIL # BLD AUTO: 0.1 K/UL (ref 0–0.5)
EOSINOPHIL NFR BLD: 1.8 % (ref 0–8)
ERYTHROCYTE [DISTWIDTH] IN BLOOD BY AUTOMATED COUNT: 17.7 % (ref 11.5–14.5)
EST. GFR  (AFRICAN AMERICAN): >60 ML/MIN/1.73 M^2
EST. GFR  (NON AFRICAN AMERICAN): >60 ML/MIN/1.73 M^2
GLUCOSE SERPL-MCNC: 82 MG/DL (ref 70–110)
HCT VFR BLD AUTO: 30 % (ref 37–48.5)
HGB BLD-MCNC: 9 G/DL (ref 12–16)
IMM GRANULOCYTES # BLD AUTO: 0.09 K/UL (ref 0–0.04)
IMM GRANULOCYTES NFR BLD AUTO: 1.6 % (ref 0–0.5)
LYMPHOCYTES # BLD AUTO: 1.4 K/UL (ref 1–4.8)
LYMPHOCYTES NFR BLD: 24.7 % (ref 18–48)
MAGNESIUM SERPL-MCNC: 2 MG/DL (ref 1.6–2.6)
MCH RBC QN AUTO: 28.9 PG (ref 27–31)
MCHC RBC AUTO-ENTMCNC: 30 G/DL (ref 32–36)
MCV RBC AUTO: 97 FL (ref 82–98)
MONOCYTES # BLD AUTO: 0.4 K/UL (ref 0.3–1)
MONOCYTES NFR BLD: 7.6 % (ref 4–15)
NEUTROPHILS # BLD AUTO: 3.5 K/UL (ref 1.8–7.7)
NEUTROPHILS NFR BLD: 63.6 % (ref 38–73)
NRBC BLD-RTO: 0 /100 WBC
PHOSPHATE SERPL-MCNC: 3 MG/DL (ref 2.7–4.5)
PLATELET # BLD AUTO: 224 K/UL (ref 150–350)
PMV BLD AUTO: 11.2 FL (ref 9.2–12.9)
POCT GLUCOSE: 101 MG/DL (ref 70–110)
POCT GLUCOSE: 120 MG/DL (ref 70–110)
POCT GLUCOSE: 82 MG/DL (ref 70–110)
POCT GLUCOSE: 88 MG/DL (ref 70–110)
POCT GLUCOSE: 89 MG/DL (ref 70–110)
POTASSIUM SERPL-SCNC: 3.8 MMOL/L (ref 3.5–5.1)
PROT SERPL-MCNC: 4.4 G/DL (ref 6–8.4)
RBC # BLD AUTO: 3.11 M/UL (ref 4–5.4)
SODIUM SERPL-SCNC: 141 MMOL/L (ref 136–145)
WBC # BLD AUTO: 5.55 K/UL (ref 3.9–12.7)

## 2021-01-28 PROCEDURE — 25000003 PHARM REV CODE 250: Performed by: HOSPITALIST

## 2021-01-28 PROCEDURE — 36415 COLL VENOUS BLD VENIPUNCTURE: CPT

## 2021-01-28 PROCEDURE — 92526 ORAL FUNCTION THERAPY: CPT

## 2021-01-28 PROCEDURE — 99232 SBSQ HOSP IP/OBS MODERATE 35: CPT | Mod: ,,, | Performed by: INTERNAL MEDICINE

## 2021-01-28 PROCEDURE — 80053 COMPREHEN METABOLIC PANEL: CPT

## 2021-01-28 PROCEDURE — 83735 ASSAY OF MAGNESIUM: CPT

## 2021-01-28 PROCEDURE — 84100 ASSAY OF PHOSPHORUS: CPT

## 2021-01-28 PROCEDURE — 11000001 HC ACUTE MED/SURG PRIVATE ROOM

## 2021-01-28 PROCEDURE — 85025 COMPLETE CBC W/AUTO DIFF WBC: CPT

## 2021-01-28 PROCEDURE — 63600175 PHARM REV CODE 636 W HCPCS: Performed by: HOSPITALIST

## 2021-01-28 PROCEDURE — 99232 PR SUBSEQUENT HOSPITAL CARE,LEVL II: ICD-10-PCS | Mod: ,,, | Performed by: INTERNAL MEDICINE

## 2021-01-28 RX ADMIN — ACETAMINOPHEN 650 MG: 325 TABLET ORAL at 02:01

## 2021-01-28 RX ADMIN — VANCOMYCIN HYDROCHLORIDE 125 MG: KIT at 05:01

## 2021-01-28 RX ADMIN — VANCOMYCIN HYDROCHLORIDE 125 MG: KIT at 12:01

## 2021-01-28 RX ADMIN — MUPIROCIN: 20 OINTMENT TOPICAL at 09:01

## 2021-01-28 RX ADMIN — METOPROLOL SUCCINATE 12.5 MG: 25 TABLET, EXTENDED RELEASE ORAL at 11:01

## 2021-01-28 RX ADMIN — VANCOMYCIN HYDROCHLORIDE 125 MG: KIT at 11:01

## 2021-01-28 RX ADMIN — VANCOMYCIN HYDROCHLORIDE 125 MG: KIT at 06:01

## 2021-01-28 RX ADMIN — ENOXAPARIN SODIUM 40 MG: 40 INJECTION SUBCUTANEOUS at 11:01

## 2021-01-29 LAB
ALBUMIN SERPL BCP-MCNC: 2.4 G/DL (ref 3.5–5.2)
ALP SERPL-CCNC: 56 U/L (ref 55–135)
ALT SERPL W/O P-5'-P-CCNC: 13 U/L (ref 10–44)
ANION GAP SERPL CALC-SCNC: 8 MMOL/L (ref 8–16)
AST SERPL-CCNC: 19 U/L (ref 10–40)
BASOPHILS # BLD AUTO: 0.04 K/UL (ref 0–0.2)
BASOPHILS NFR BLD: 0.8 % (ref 0–1.9)
BILIRUB SERPL-MCNC: 0.3 MG/DL (ref 0.1–1)
BUN SERPL-MCNC: 21 MG/DL (ref 8–23)
CALCIUM SERPL-MCNC: 8.2 MG/DL (ref 8.7–10.5)
CHLORIDE SERPL-SCNC: 116 MMOL/L (ref 95–110)
CO2 SERPL-SCNC: 17 MMOL/L (ref 23–29)
CREAT SERPL-MCNC: 0.7 MG/DL (ref 0.5–1.4)
DIFFERENTIAL METHOD: ABNORMAL
EOSINOPHIL # BLD AUTO: 0.2 K/UL (ref 0–0.5)
EOSINOPHIL NFR BLD: 3 % (ref 0–8)
ERYTHROCYTE [DISTWIDTH] IN BLOOD BY AUTOMATED COUNT: 17.9 % (ref 11.5–14.5)
EST. GFR  (AFRICAN AMERICAN): >60 ML/MIN/1.73 M^2
EST. GFR  (NON AFRICAN AMERICAN): >60 ML/MIN/1.73 M^2
GLUCOSE SERPL-MCNC: 89 MG/DL (ref 70–110)
HCT VFR BLD AUTO: 34.1 % (ref 37–48.5)
HGB BLD-MCNC: 9.9 G/DL (ref 12–16)
IMM GRANULOCYTES # BLD AUTO: 0.08 K/UL (ref 0–0.04)
IMM GRANULOCYTES NFR BLD AUTO: 1.6 % (ref 0–0.5)
LYMPHOCYTES # BLD AUTO: 1.3 K/UL (ref 1–4.8)
LYMPHOCYTES NFR BLD: 24.7 % (ref 18–48)
MAGNESIUM SERPL-MCNC: 2 MG/DL (ref 1.6–2.6)
MCH RBC QN AUTO: 28.4 PG (ref 27–31)
MCHC RBC AUTO-ENTMCNC: 29 G/DL (ref 32–36)
MCV RBC AUTO: 98 FL (ref 82–98)
MONOCYTES # BLD AUTO: 0.4 K/UL (ref 0.3–1)
MONOCYTES NFR BLD: 8.1 % (ref 4–15)
NEUTROPHILS # BLD AUTO: 3.1 K/UL (ref 1.8–7.7)
NEUTROPHILS NFR BLD: 61.8 % (ref 38–73)
NRBC BLD-RTO: 0 /100 WBC
PHOSPHATE SERPL-MCNC: 2.2 MG/DL (ref 2.7–4.5)
PLATELET # BLD AUTO: 205 K/UL (ref 150–350)
PMV BLD AUTO: 11 FL (ref 9.2–12.9)
POCT GLUCOSE: 100 MG/DL (ref 70–110)
POCT GLUCOSE: 111 MG/DL (ref 70–110)
POCT GLUCOSE: 96 MG/DL (ref 70–110)
POTASSIUM SERPL-SCNC: 3.7 MMOL/L (ref 3.5–5.1)
PROT SERPL-MCNC: 4.8 G/DL (ref 6–8.4)
RBC # BLD AUTO: 3.49 M/UL (ref 4–5.4)
SODIUM SERPL-SCNC: 141 MMOL/L (ref 136–145)
WBC # BLD AUTO: 5.07 K/UL (ref 3.9–12.7)

## 2021-01-29 PROCEDURE — 84100 ASSAY OF PHOSPHORUS: CPT

## 2021-01-29 PROCEDURE — 83735 ASSAY OF MAGNESIUM: CPT

## 2021-01-29 PROCEDURE — 11000001 HC ACUTE MED/SURG PRIVATE ROOM

## 2021-01-29 PROCEDURE — 99232 SBSQ HOSP IP/OBS MODERATE 35: CPT | Mod: ,,, | Performed by: INTERNAL MEDICINE

## 2021-01-29 PROCEDURE — 99232 PR SUBSEQUENT HOSPITAL CARE,LEVL II: ICD-10-PCS | Mod: ,,, | Performed by: INTERNAL MEDICINE

## 2021-01-29 PROCEDURE — 36415 COLL VENOUS BLD VENIPUNCTURE: CPT

## 2021-01-29 PROCEDURE — 63600175 PHARM REV CODE 636 W HCPCS: Performed by: HOSPITALIST

## 2021-01-29 PROCEDURE — 80053 COMPREHEN METABOLIC PANEL: CPT

## 2021-01-29 PROCEDURE — 85025 COMPLETE CBC W/AUTO DIFF WBC: CPT

## 2021-01-29 PROCEDURE — 25000003 PHARM REV CODE 250: Performed by: HOSPITALIST

## 2021-01-29 PROCEDURE — 92526 ORAL FUNCTION THERAPY: CPT

## 2021-01-29 RX ADMIN — VANCOMYCIN HYDROCHLORIDE 125 MG: KIT at 05:01

## 2021-01-29 RX ADMIN — METOPROLOL SUCCINATE 12.5 MG: 25 TABLET, EXTENDED RELEASE ORAL at 09:01

## 2021-01-29 RX ADMIN — VANCOMYCIN HYDROCHLORIDE 125 MG: KIT at 12:01

## 2021-01-29 RX ADMIN — VANCOMYCIN HYDROCHLORIDE 125 MG: KIT at 11:01

## 2021-01-29 RX ADMIN — ENOXAPARIN SODIUM 40 MG: 40 INJECTION SUBCUTANEOUS at 09:01

## 2021-01-30 LAB
ALBUMIN SERPL BCP-MCNC: 2.2 G/DL (ref 3.5–5.2)
ALP SERPL-CCNC: 59 U/L (ref 55–135)
ALT SERPL W/O P-5'-P-CCNC: 12 U/L (ref 10–44)
ANION GAP SERPL CALC-SCNC: 5 MMOL/L (ref 8–16)
AST SERPL-CCNC: 18 U/L (ref 10–40)
BASOPHILS # BLD AUTO: 0.03 K/UL (ref 0–0.2)
BASOPHILS NFR BLD: 0.6 % (ref 0–1.9)
BILIRUB SERPL-MCNC: 0.3 MG/DL (ref 0.1–1)
BUN SERPL-MCNC: 19 MG/DL (ref 8–23)
CALCIUM SERPL-MCNC: 7.9 MG/DL (ref 8.7–10.5)
CHLORIDE SERPL-SCNC: 115 MMOL/L (ref 95–110)
CO2 SERPL-SCNC: 21 MMOL/L (ref 23–29)
CREAT SERPL-MCNC: 0.7 MG/DL (ref 0.5–1.4)
DIFFERENTIAL METHOD: ABNORMAL
EOSINOPHIL # BLD AUTO: 0.1 K/UL (ref 0–0.5)
EOSINOPHIL NFR BLD: 1.6 % (ref 0–8)
ERYTHROCYTE [DISTWIDTH] IN BLOOD BY AUTOMATED COUNT: 17.9 % (ref 11.5–14.5)
EST. GFR  (AFRICAN AMERICAN): >60 ML/MIN/1.73 M^2
EST. GFR  (NON AFRICAN AMERICAN): >60 ML/MIN/1.73 M^2
GLUCOSE SERPL-MCNC: 68 MG/DL (ref 70–110)
HCT VFR BLD AUTO: 33.5 % (ref 37–48.5)
HGB BLD-MCNC: 9.8 G/DL (ref 12–16)
IMM GRANULOCYTES # BLD AUTO: 0.05 K/UL (ref 0–0.04)
IMM GRANULOCYTES NFR BLD AUTO: 1 % (ref 0–0.5)
LYMPHOCYTES # BLD AUTO: 1.4 K/UL (ref 1–4.8)
LYMPHOCYTES NFR BLD: 28.4 % (ref 18–48)
MAGNESIUM SERPL-MCNC: 1.9 MG/DL (ref 1.6–2.6)
MCH RBC QN AUTO: 28.5 PG (ref 27–31)
MCHC RBC AUTO-ENTMCNC: 29.3 G/DL (ref 32–36)
MCV RBC AUTO: 97 FL (ref 82–98)
MONOCYTES # BLD AUTO: 0.4 K/UL (ref 0.3–1)
MONOCYTES NFR BLD: 8.3 % (ref 4–15)
NEUTROPHILS # BLD AUTO: 3 K/UL (ref 1.8–7.7)
NEUTROPHILS NFR BLD: 60.1 % (ref 38–73)
NRBC BLD-RTO: 0 /100 WBC
PHOSPHATE SERPL-MCNC: 2.3 MG/DL (ref 2.7–4.5)
PLATELET # BLD AUTO: 209 K/UL (ref 150–350)
PMV BLD AUTO: 11.2 FL (ref 9.2–12.9)
POCT GLUCOSE: 81 MG/DL (ref 70–110)
POCT GLUCOSE: 82 MG/DL (ref 70–110)
POCT GLUCOSE: 97 MG/DL (ref 70–110)
POTASSIUM SERPL-SCNC: 3.6 MMOL/L (ref 3.5–5.1)
PROT SERPL-MCNC: 4.5 G/DL (ref 6–8.4)
RBC # BLD AUTO: 3.44 M/UL (ref 4–5.4)
SODIUM SERPL-SCNC: 141 MMOL/L (ref 136–145)
WBC # BLD AUTO: 5.04 K/UL (ref 3.9–12.7)

## 2021-01-30 PROCEDURE — 99232 SBSQ HOSP IP/OBS MODERATE 35: CPT | Mod: ,,, | Performed by: INTERNAL MEDICINE

## 2021-01-30 PROCEDURE — 80053 COMPREHEN METABOLIC PANEL: CPT

## 2021-01-30 PROCEDURE — 25000003 PHARM REV CODE 250: Performed by: HOSPITALIST

## 2021-01-30 PROCEDURE — 36415 COLL VENOUS BLD VENIPUNCTURE: CPT

## 2021-01-30 PROCEDURE — 85025 COMPLETE CBC W/AUTO DIFF WBC: CPT

## 2021-01-30 PROCEDURE — 84100 ASSAY OF PHOSPHORUS: CPT

## 2021-01-30 PROCEDURE — 99232 PR SUBSEQUENT HOSPITAL CARE,LEVL II: ICD-10-PCS | Mod: ,,, | Performed by: INTERNAL MEDICINE

## 2021-01-30 PROCEDURE — 11000001 HC ACUTE MED/SURG PRIVATE ROOM

## 2021-01-30 PROCEDURE — 83735 ASSAY OF MAGNESIUM: CPT

## 2021-01-30 PROCEDURE — 63600175 PHARM REV CODE 636 W HCPCS: Performed by: HOSPITALIST

## 2021-01-30 RX ADMIN — VANCOMYCIN HYDROCHLORIDE 125 MG: KIT at 12:01

## 2021-01-30 RX ADMIN — VANCOMYCIN HYDROCHLORIDE 125 MG: KIT at 11:01

## 2021-01-30 RX ADMIN — METOPROLOL SUCCINATE 12.5 MG: 25 TABLET, EXTENDED RELEASE ORAL at 09:01

## 2021-01-30 RX ADMIN — VANCOMYCIN HYDROCHLORIDE 125 MG: KIT at 06:01

## 2021-01-30 RX ADMIN — VANCOMYCIN HYDROCHLORIDE 125 MG: KIT at 05:01

## 2021-01-30 RX ADMIN — ENOXAPARIN SODIUM 40 MG: 40 INJECTION SUBCUTANEOUS at 09:01

## 2021-01-31 LAB
ALBUMIN SERPL BCP-MCNC: 2.2 G/DL (ref 3.5–5.2)
ALP SERPL-CCNC: 61 U/L (ref 55–135)
ALT SERPL W/O P-5'-P-CCNC: 11 U/L (ref 10–44)
ANION GAP SERPL CALC-SCNC: 7 MMOL/L (ref 8–16)
AST SERPL-CCNC: 16 U/L (ref 10–40)
BASOPHILS # BLD AUTO: 0.02 K/UL (ref 0–0.2)
BASOPHILS NFR BLD: 0.5 % (ref 0–1.9)
BILIRUB SERPL-MCNC: 0.4 MG/DL (ref 0.1–1)
BUN SERPL-MCNC: 18 MG/DL (ref 8–23)
CALCIUM SERPL-MCNC: 8.1 MG/DL (ref 8.7–10.5)
CHLORIDE SERPL-SCNC: 113 MMOL/L (ref 95–110)
CO2 SERPL-SCNC: 19 MMOL/L (ref 23–29)
CREAT SERPL-MCNC: 0.6 MG/DL (ref 0.5–1.4)
DIFFERENTIAL METHOD: ABNORMAL
EOSINOPHIL # BLD AUTO: 0.1 K/UL (ref 0–0.5)
EOSINOPHIL NFR BLD: 1.4 % (ref 0–8)
ERYTHROCYTE [DISTWIDTH] IN BLOOD BY AUTOMATED COUNT: 17.8 % (ref 11.5–14.5)
EST. GFR  (AFRICAN AMERICAN): >60 ML/MIN/1.73 M^2
EST. GFR  (NON AFRICAN AMERICAN): >60 ML/MIN/1.73 M^2
GLUCOSE SERPL-MCNC: 68 MG/DL (ref 70–110)
HCT VFR BLD AUTO: 30.4 % (ref 37–48.5)
HGB BLD-MCNC: 9.1 G/DL (ref 12–16)
IMM GRANULOCYTES # BLD AUTO: 0.04 K/UL (ref 0–0.04)
IMM GRANULOCYTES NFR BLD AUTO: 0.9 % (ref 0–0.5)
LYMPHOCYTES # BLD AUTO: 1.1 K/UL (ref 1–4.8)
LYMPHOCYTES NFR BLD: 26.3 % (ref 18–48)
MAGNESIUM SERPL-MCNC: 1.8 MG/DL (ref 1.6–2.6)
MCH RBC QN AUTO: 28.3 PG (ref 27–31)
MCHC RBC AUTO-ENTMCNC: 29.9 G/DL (ref 32–36)
MCV RBC AUTO: 94 FL (ref 82–98)
MONOCYTES # BLD AUTO: 0.3 K/UL (ref 0.3–1)
MONOCYTES NFR BLD: 6.5 % (ref 4–15)
NEUTROPHILS # BLD AUTO: 2.8 K/UL (ref 1.8–7.7)
NEUTROPHILS NFR BLD: 64.4 % (ref 38–73)
NRBC BLD-RTO: 0 /100 WBC
PHOSPHATE SERPL-MCNC: 2.4 MG/DL (ref 2.7–4.5)
PLATELET # BLD AUTO: 197 K/UL (ref 150–350)
PMV BLD AUTO: 11.1 FL (ref 9.2–12.9)
POCT GLUCOSE: 106 MG/DL (ref 70–110)
POCT GLUCOSE: 125 MG/DL (ref 70–110)
POCT GLUCOSE: 143 MG/DL (ref 70–110)
POTASSIUM SERPL-SCNC: 3.3 MMOL/L (ref 3.5–5.1)
PROT SERPL-MCNC: 4.6 G/DL (ref 6–8.4)
RBC # BLD AUTO: 3.22 M/UL (ref 4–5.4)
SODIUM SERPL-SCNC: 139 MMOL/L (ref 136–145)
WBC # BLD AUTO: 4.34 K/UL (ref 3.9–12.7)

## 2021-01-31 PROCEDURE — 63600175 PHARM REV CODE 636 W HCPCS: Performed by: HOSPITALIST

## 2021-01-31 PROCEDURE — 99232 SBSQ HOSP IP/OBS MODERATE 35: CPT | Mod: ,,, | Performed by: INTERNAL MEDICINE

## 2021-01-31 PROCEDURE — 99232 PR SUBSEQUENT HOSPITAL CARE,LEVL II: ICD-10-PCS | Mod: ,,, | Performed by: INTERNAL MEDICINE

## 2021-01-31 PROCEDURE — 80053 COMPREHEN METABOLIC PANEL: CPT

## 2021-01-31 PROCEDURE — 25000003 PHARM REV CODE 250: Performed by: HOSPITALIST

## 2021-01-31 PROCEDURE — 83735 ASSAY OF MAGNESIUM: CPT

## 2021-01-31 PROCEDURE — 84100 ASSAY OF PHOSPHORUS: CPT

## 2021-01-31 PROCEDURE — 36415 COLL VENOUS BLD VENIPUNCTURE: CPT

## 2021-01-31 PROCEDURE — 85025 COMPLETE CBC W/AUTO DIFF WBC: CPT

## 2021-01-31 PROCEDURE — 11000001 HC ACUTE MED/SURG PRIVATE ROOM

## 2021-01-31 RX ADMIN — VANCOMYCIN HYDROCHLORIDE 125 MG: KIT at 12:01

## 2021-01-31 RX ADMIN — VANCOMYCIN HYDROCHLORIDE 125 MG: KIT at 05:01

## 2021-01-31 RX ADMIN — ENOXAPARIN SODIUM 40 MG: 40 INJECTION SUBCUTANEOUS at 09:01

## 2021-01-31 RX ADMIN — METOPROLOL SUCCINATE 12.5 MG: 25 TABLET, EXTENDED RELEASE ORAL at 09:01

## 2021-01-31 RX ADMIN — VANCOMYCIN HYDROCHLORIDE 125 MG: KIT at 06:01

## 2021-02-01 PROBLEM — R45.3 APATHY: Status: ACTIVE | Noted: 2021-02-01

## 2021-02-01 LAB
ALBUMIN SERPL BCP-MCNC: 2.2 G/DL (ref 3.5–5.2)
ALP SERPL-CCNC: 59 U/L (ref 55–135)
ALT SERPL W/O P-5'-P-CCNC: 11 U/L (ref 10–44)
ANION GAP SERPL CALC-SCNC: 4 MMOL/L (ref 8–16)
AST SERPL-CCNC: 14 U/L (ref 10–40)
BASOPHILS # BLD AUTO: 0.03 K/UL (ref 0–0.2)
BASOPHILS NFR BLD: 0.6 % (ref 0–1.9)
BILIRUB SERPL-MCNC: 0.3 MG/DL (ref 0.1–1)
BUN SERPL-MCNC: 20 MG/DL (ref 8–23)
CALCIUM SERPL-MCNC: 7.9 MG/DL (ref 8.7–10.5)
CHLORIDE SERPL-SCNC: 111 MMOL/L (ref 95–110)
CO2 SERPL-SCNC: 20 MMOL/L (ref 23–29)
CREAT SERPL-MCNC: 0.8 MG/DL (ref 0.5–1.4)
DIFFERENTIAL METHOD: ABNORMAL
EOSINOPHIL # BLD AUTO: 0.1 K/UL (ref 0–0.5)
EOSINOPHIL NFR BLD: 1.6 % (ref 0–8)
ERYTHROCYTE [DISTWIDTH] IN BLOOD BY AUTOMATED COUNT: 17.6 % (ref 11.5–14.5)
EST. GFR  (AFRICAN AMERICAN): >60 ML/MIN/1.73 M^2
EST. GFR  (NON AFRICAN AMERICAN): >60 ML/MIN/1.73 M^2
GLUCOSE SERPL-MCNC: 72 MG/DL (ref 70–110)
HCT VFR BLD AUTO: 29.7 % (ref 37–48.5)
HGB BLD-MCNC: 8.9 G/DL (ref 12–16)
IMM GRANULOCYTES # BLD AUTO: 0.03 K/UL (ref 0–0.04)
IMM GRANULOCYTES NFR BLD AUTO: 0.6 % (ref 0–0.5)
LYMPHOCYTES # BLD AUTO: 1.2 K/UL (ref 1–4.8)
LYMPHOCYTES NFR BLD: 23.8 % (ref 18–48)
MAGNESIUM SERPL-MCNC: 1.8 MG/DL (ref 1.6–2.6)
MCH RBC QN AUTO: 28.2 PG (ref 27–31)
MCHC RBC AUTO-ENTMCNC: 30 G/DL (ref 32–36)
MCV RBC AUTO: 94 FL (ref 82–98)
MONOCYTES # BLD AUTO: 0.3 K/UL (ref 0.3–1)
MONOCYTES NFR BLD: 6 % (ref 4–15)
NEUTROPHILS # BLD AUTO: 3.4 K/UL (ref 1.8–7.7)
NEUTROPHILS NFR BLD: 67.4 % (ref 38–73)
NRBC BLD-RTO: 0 /100 WBC
PHOSPHATE SERPL-MCNC: 2.8 MG/DL (ref 2.7–4.5)
PLATELET # BLD AUTO: 207 K/UL (ref 150–350)
PMV BLD AUTO: 10.6 FL (ref 9.2–12.9)
POCT GLUCOSE: 110 MG/DL (ref 70–110)
POCT GLUCOSE: 66 MG/DL (ref 70–110)
POCT GLUCOSE: 79 MG/DL (ref 70–110)
POTASSIUM SERPL-SCNC: 3.4 MMOL/L (ref 3.5–5.1)
PROT SERPL-MCNC: 4.5 G/DL (ref 6–8.4)
RBC # BLD AUTO: 3.16 M/UL (ref 4–5.4)
SODIUM SERPL-SCNC: 135 MMOL/L (ref 136–145)
WBC # BLD AUTO: 5 K/UL (ref 3.9–12.7)

## 2021-02-01 PROCEDURE — 97162 PT EVAL MOD COMPLEX 30 MIN: CPT

## 2021-02-01 PROCEDURE — 83735 ASSAY OF MAGNESIUM: CPT

## 2021-02-01 PROCEDURE — 97530 THERAPEUTIC ACTIVITIES: CPT

## 2021-02-01 PROCEDURE — 25000003 PHARM REV CODE 250: Performed by: HOSPITALIST

## 2021-02-01 PROCEDURE — 63600175 PHARM REV CODE 636 W HCPCS: Performed by: HOSPITALIST

## 2021-02-01 PROCEDURE — 97535 SELF CARE MNGMENT TRAINING: CPT

## 2021-02-01 PROCEDURE — 84100 ASSAY OF PHOSPHORUS: CPT

## 2021-02-01 PROCEDURE — 99232 PR SUBSEQUENT HOSPITAL CARE,LEVL II: ICD-10-PCS | Mod: ,,, | Performed by: INTERNAL MEDICINE

## 2021-02-01 PROCEDURE — 99223 1ST HOSP IP/OBS HIGH 75: CPT | Mod: ,,, | Performed by: PSYCHIATRY & NEUROLOGY

## 2021-02-01 PROCEDURE — 97166 OT EVAL MOD COMPLEX 45 MIN: CPT

## 2021-02-01 PROCEDURE — 11000001 HC ACUTE MED/SURG PRIVATE ROOM

## 2021-02-01 PROCEDURE — 85025 COMPLETE CBC W/AUTO DIFF WBC: CPT

## 2021-02-01 PROCEDURE — 80053 COMPREHEN METABOLIC PANEL: CPT

## 2021-02-01 PROCEDURE — 99223 PR INITIAL HOSPITAL CARE,LEVL III: ICD-10-PCS | Mod: ,,, | Performed by: PSYCHIATRY & NEUROLOGY

## 2021-02-01 PROCEDURE — 36415 COLL VENOUS BLD VENIPUNCTURE: CPT

## 2021-02-01 PROCEDURE — 99232 SBSQ HOSP IP/OBS MODERATE 35: CPT | Mod: ,,, | Performed by: INTERNAL MEDICINE

## 2021-02-01 RX ADMIN — ENOXAPARIN SODIUM 40 MG: 40 INJECTION SUBCUTANEOUS at 08:02

## 2021-02-01 RX ADMIN — VANCOMYCIN HYDROCHLORIDE 125 MG: KIT at 12:02

## 2021-02-01 RX ADMIN — METOPROLOL SUCCINATE 12.5 MG: 25 TABLET, EXTENDED RELEASE ORAL at 08:02

## 2021-02-02 LAB
ALBUMIN SERPL BCP-MCNC: 2.2 G/DL (ref 3.5–5.2)
ALP SERPL-CCNC: 61 U/L (ref 55–135)
ALT SERPL W/O P-5'-P-CCNC: 11 U/L (ref 10–44)
ANION GAP SERPL CALC-SCNC: 7 MMOL/L (ref 8–16)
AST SERPL-CCNC: 17 U/L (ref 10–40)
BASOPHILS # BLD AUTO: 0.03 K/UL (ref 0–0.2)
BASOPHILS NFR BLD: 0.6 % (ref 0–1.9)
BILIRUB SERPL-MCNC: 0.3 MG/DL (ref 0.1–1)
BUN SERPL-MCNC: 21 MG/DL (ref 8–23)
CALCIUM SERPL-MCNC: 8.2 MG/DL (ref 8.7–10.5)
CHLORIDE SERPL-SCNC: 110 MMOL/L (ref 95–110)
CO2 SERPL-SCNC: 20 MMOL/L (ref 23–29)
CREAT SERPL-MCNC: 0.8 MG/DL (ref 0.5–1.4)
DIFFERENTIAL METHOD: ABNORMAL
EOSINOPHIL # BLD AUTO: 0.1 K/UL (ref 0–0.5)
EOSINOPHIL NFR BLD: 2 % (ref 0–8)
ERYTHROCYTE [DISTWIDTH] IN BLOOD BY AUTOMATED COUNT: 17.6 % (ref 11.5–14.5)
EST. GFR  (AFRICAN AMERICAN): >60 ML/MIN/1.73 M^2
EST. GFR  (NON AFRICAN AMERICAN): >60 ML/MIN/1.73 M^2
GLUCOSE SERPL-MCNC: 83 MG/DL (ref 70–110)
HCT VFR BLD AUTO: 27.9 % (ref 37–48.5)
HGB BLD-MCNC: 8.6 G/DL (ref 12–16)
IMM GRANULOCYTES # BLD AUTO: 0.03 K/UL (ref 0–0.04)
IMM GRANULOCYTES NFR BLD AUTO: 0.6 % (ref 0–0.5)
LYMPHOCYTES # BLD AUTO: 1.5 K/UL (ref 1–4.8)
LYMPHOCYTES NFR BLD: 30.8 % (ref 18–48)
MAGNESIUM SERPL-MCNC: 1.8 MG/DL (ref 1.6–2.6)
MCH RBC QN AUTO: 29.2 PG (ref 27–31)
MCHC RBC AUTO-ENTMCNC: 30.8 G/DL (ref 32–36)
MCV RBC AUTO: 95 FL (ref 82–98)
MONOCYTES # BLD AUTO: 0.3 K/UL (ref 0.3–1)
MONOCYTES NFR BLD: 5.7 % (ref 4–15)
NEUTROPHILS # BLD AUTO: 3 K/UL (ref 1.8–7.7)
NEUTROPHILS NFR BLD: 60.3 % (ref 38–73)
NRBC BLD-RTO: 0 /100 WBC
PHOSPHATE SERPL-MCNC: 2.9 MG/DL (ref 2.7–4.5)
PLATELET # BLD AUTO: 209 K/UL (ref 150–350)
PMV BLD AUTO: 11.1 FL (ref 9.2–12.9)
POCT GLUCOSE: 108 MG/DL (ref 70–110)
POCT GLUCOSE: 72 MG/DL (ref 70–110)
POCT GLUCOSE: 74 MG/DL (ref 70–110)
POCT GLUCOSE: 74 MG/DL (ref 70–110)
POCT GLUCOSE: 76 MG/DL (ref 70–110)
POCT GLUCOSE: 79 MG/DL (ref 70–110)
POTASSIUM SERPL-SCNC: 3.4 MMOL/L (ref 3.5–5.1)
PROT SERPL-MCNC: 4.7 G/DL (ref 6–8.4)
RBC # BLD AUTO: 2.95 M/UL (ref 4–5.4)
SODIUM SERPL-SCNC: 137 MMOL/L (ref 136–145)
WBC # BLD AUTO: 4.94 K/UL (ref 3.9–12.7)

## 2021-02-02 PROCEDURE — 84100 ASSAY OF PHOSPHORUS: CPT

## 2021-02-02 PROCEDURE — 63600175 PHARM REV CODE 636 W HCPCS: Performed by: HOSPITALIST

## 2021-02-02 PROCEDURE — 92526 ORAL FUNCTION THERAPY: CPT

## 2021-02-02 PROCEDURE — 99238 PR HOSPITAL DISCHARGE DAY,<30 MIN: ICD-10-PCS | Mod: ,,, | Performed by: INTERNAL MEDICINE

## 2021-02-02 PROCEDURE — 85025 COMPLETE CBC W/AUTO DIFF WBC: CPT

## 2021-02-02 PROCEDURE — 80053 COMPREHEN METABOLIC PANEL: CPT

## 2021-02-02 PROCEDURE — 36415 COLL VENOUS BLD VENIPUNCTURE: CPT

## 2021-02-02 PROCEDURE — 99238 HOSP IP/OBS DSCHRG MGMT 30/<: CPT | Mod: ,,, | Performed by: INTERNAL MEDICINE

## 2021-02-02 PROCEDURE — 11000001 HC ACUTE MED/SURG PRIVATE ROOM

## 2021-02-02 PROCEDURE — 83735 ASSAY OF MAGNESIUM: CPT

## 2021-02-02 PROCEDURE — 25000003 PHARM REV CODE 250: Performed by: HOSPITALIST

## 2021-02-02 RX ADMIN — ENOXAPARIN SODIUM 40 MG: 40 INJECTION SUBCUTANEOUS at 09:02

## 2021-02-02 RX ADMIN — METOPROLOL SUCCINATE 12.5 MG: 25 TABLET, EXTENDED RELEASE ORAL at 09:02

## 2021-02-03 VITALS
BODY MASS INDEX: 27.32 KG/M2 | HEIGHT: 65 IN | RESPIRATION RATE: 26 BRPM | TEMPERATURE: 98 F | OXYGEN SATURATION: 97 % | DIASTOLIC BLOOD PRESSURE: 73 MMHG | HEART RATE: 86 BPM | WEIGHT: 164 LBS | SYSTOLIC BLOOD PRESSURE: 126 MMHG

## 2021-02-03 LAB
ALBUMIN SERPL BCP-MCNC: 2.1 G/DL (ref 3.5–5.2)
ALP SERPL-CCNC: 67 U/L (ref 55–135)
ALT SERPL W/O P-5'-P-CCNC: 11 U/L (ref 10–44)
ANION GAP SERPL CALC-SCNC: 6 MMOL/L (ref 8–16)
AST SERPL-CCNC: 17 U/L (ref 10–40)
BASOPHILS # BLD AUTO: 0.03 K/UL (ref 0–0.2)
BASOPHILS NFR BLD: 0.4 % (ref 0–1.9)
BILIRUB SERPL-MCNC: 0.2 MG/DL (ref 0.1–1)
BUN SERPL-MCNC: 19 MG/DL (ref 8–23)
CALCIUM SERPL-MCNC: 8.4 MG/DL (ref 8.7–10.5)
CHLORIDE SERPL-SCNC: 114 MMOL/L (ref 95–110)
CO2 SERPL-SCNC: 21 MMOL/L (ref 23–29)
CREAT SERPL-MCNC: 0.8 MG/DL (ref 0.5–1.4)
DIFFERENTIAL METHOD: ABNORMAL
EOSINOPHIL # BLD AUTO: 0.1 K/UL (ref 0–0.5)
EOSINOPHIL NFR BLD: 1 % (ref 0–8)
ERYTHROCYTE [DISTWIDTH] IN BLOOD BY AUTOMATED COUNT: 17.7 % (ref 11.5–14.5)
EST. GFR  (AFRICAN AMERICAN): >60 ML/MIN/1.73 M^2
EST. GFR  (NON AFRICAN AMERICAN): >60 ML/MIN/1.73 M^2
GLUCOSE SERPL-MCNC: 100 MG/DL (ref 70–110)
HCT VFR BLD AUTO: 30.1 % (ref 37–48.5)
HGB BLD-MCNC: 8.7 G/DL (ref 12–16)
IMM GRANULOCYTES # BLD AUTO: 0.03 K/UL (ref 0–0.04)
IMM GRANULOCYTES NFR BLD AUTO: 0.4 % (ref 0–0.5)
LYMPHOCYTES # BLD AUTO: 0.9 K/UL (ref 1–4.8)
LYMPHOCYTES NFR BLD: 14 % (ref 18–48)
MAGNESIUM SERPL-MCNC: 1.8 MG/DL (ref 1.6–2.6)
MCH RBC QN AUTO: 28.7 PG (ref 27–31)
MCHC RBC AUTO-ENTMCNC: 28.9 G/DL (ref 32–36)
MCV RBC AUTO: 99 FL (ref 82–98)
MONOCYTES # BLD AUTO: 0.4 K/UL (ref 0.3–1)
MONOCYTES NFR BLD: 5.4 % (ref 4–15)
NEUTROPHILS # BLD AUTO: 5.3 K/UL (ref 1.8–7.7)
NEUTROPHILS NFR BLD: 78.8 % (ref 38–73)
NRBC BLD-RTO: 0 /100 WBC
PHOSPHATE SERPL-MCNC: 2.9 MG/DL (ref 2.7–4.5)
PLATELET # BLD AUTO: 207 K/UL (ref 150–350)
PMV BLD AUTO: 11.2 FL (ref 9.2–12.9)
POCT GLUCOSE: 117 MG/DL (ref 70–110)
POCT GLUCOSE: 124 MG/DL (ref 70–110)
POCT GLUCOSE: 87 MG/DL (ref 70–110)
POTASSIUM SERPL-SCNC: 4.2 MMOL/L (ref 3.5–5.1)
PROT SERPL-MCNC: 4.7 G/DL (ref 6–8.4)
RBC # BLD AUTO: 3.03 M/UL (ref 4–5.4)
SODIUM SERPL-SCNC: 141 MMOL/L (ref 136–145)
WBC # BLD AUTO: 6.7 K/UL (ref 3.9–12.7)

## 2021-02-03 PROCEDURE — 99239 PR HOSPITAL DISCHARGE DAY,>30 MIN: ICD-10-PCS | Mod: ,,, | Performed by: INTERNAL MEDICINE

## 2021-02-03 PROCEDURE — 80053 COMPREHEN METABOLIC PANEL: CPT

## 2021-02-03 PROCEDURE — 36415 COLL VENOUS BLD VENIPUNCTURE: CPT

## 2021-02-03 PROCEDURE — 99239 HOSP IP/OBS DSCHRG MGMT >30: CPT | Mod: ,,, | Performed by: INTERNAL MEDICINE

## 2021-02-03 PROCEDURE — 63600175 PHARM REV CODE 636 W HCPCS: Performed by: HOSPITALIST

## 2021-02-03 PROCEDURE — 83735 ASSAY OF MAGNESIUM: CPT

## 2021-02-03 PROCEDURE — 85025 COMPLETE CBC W/AUTO DIFF WBC: CPT

## 2021-02-03 PROCEDURE — 84100 ASSAY OF PHOSPHORUS: CPT

## 2021-02-03 PROCEDURE — 25000003 PHARM REV CODE 250: Performed by: HOSPITALIST

## 2021-02-03 RX ORDER — MIRTAZAPINE 15 MG/1
15 TABLET, ORALLY DISINTEGRATING ORAL NIGHTLY
Qty: 30 TABLET | Refills: 11 | Status: SHIPPED | OUTPATIENT
Start: 2021-02-03 | End: 2022-02-03

## 2021-02-03 RX ADMIN — ENOXAPARIN SODIUM 40 MG: 40 INJECTION SUBCUTANEOUS at 08:02

## 2021-02-03 RX ADMIN — METOPROLOL SUCCINATE 12.5 MG: 25 TABLET, EXTENDED RELEASE ORAL at 08:02

## 2021-02-05 ENCOUNTER — PATIENT OUTREACH (OUTPATIENT)
Dept: ADMINISTRATIVE | Facility: CLINIC | Age: 86
End: 2021-02-05

## 2021-02-05 PROCEDURE — G0180 MD CERTIFICATION HHA PATIENT: HCPCS | Mod: ,,, | Performed by: INTERNAL MEDICINE

## 2021-02-05 PROCEDURE — G0180 PR HOME HEALTH MD CERTIFICATION: ICD-10-PCS | Mod: ,,, | Performed by: INTERNAL MEDICINE

## 2021-02-09 ENCOUNTER — TELEPHONE (OUTPATIENT)
Dept: INTERNAL MEDICINE | Facility: CLINIC | Age: 86
End: 2021-02-09

## 2021-03-08 ENCOUNTER — EXTERNAL HOME HEALTH (OUTPATIENT)
Dept: HOME HEALTH SERVICES | Facility: HOSPITAL | Age: 86
End: 2021-03-08
Payer: MEDICARE

## 2021-03-11 ENCOUNTER — DOCUMENT SCAN (OUTPATIENT)
Dept: HOME HEALTH SERVICES | Facility: HOSPITAL | Age: 86
End: 2021-03-11
Payer: MEDICARE

## 2021-03-11 ENCOUNTER — DOCUMENT SCAN (OUTPATIENT)
Dept: HOME HEALTH SERVICES | Facility: HOSPITAL | Age: 86
End: 2021-03-11

## 2021-03-15 ENCOUNTER — DOCUMENT SCAN (OUTPATIENT)
Dept: HOME HEALTH SERVICES | Facility: HOSPITAL | Age: 86
End: 2021-03-15
Payer: MEDICARE

## 2021-03-23 ENCOUNTER — DOCUMENT SCAN (OUTPATIENT)
Dept: HOME HEALTH SERVICES | Facility: HOSPITAL | Age: 86
End: 2021-03-23
Payer: MEDICARE

## 2021-03-24 ENCOUNTER — DOCUMENT SCAN (OUTPATIENT)
Dept: HOME HEALTH SERVICES | Facility: HOSPITAL | Age: 86
End: 2021-03-24
Payer: MEDICARE

## 2021-03-25 ENCOUNTER — DOCUMENT SCAN (OUTPATIENT)
Dept: HOME HEALTH SERVICES | Facility: HOSPITAL | Age: 86
End: 2021-03-25
Payer: MEDICARE

## 2024-04-04 NOTE — ASSESSMENT & PLAN NOTE
Present on admit. PT/OT consulted and recommending SNF placement on discharge and CM/SW working with family and patient accepted to [facility] and when medically ready can discharge to that facility.      [FreeTextEntry1] : Patient presents for annual physical exam. [de-identified] : Patient is doing well overall.  Reports she recently saw GI for bowel movement issues. Otherwise has no acute concern. Denies any CP, chest tightness or SOB. Denies any abdominal pain, urinary symptom. Did have some constipation on zepbound-taking miralax has been passing more frequently Denies any fever, chills or night sweats.